# Patient Record
Sex: MALE | Race: WHITE | NOT HISPANIC OR LATINO | Employment: OTHER | URBAN - METROPOLITAN AREA
[De-identification: names, ages, dates, MRNs, and addresses within clinical notes are randomized per-mention and may not be internally consistent; named-entity substitution may affect disease eponyms.]

---

## 2019-01-27 ENCOUNTER — HOSPITAL ENCOUNTER (INPATIENT)
Facility: CLINIC | Age: 34
LOS: 8 days | Discharge: HOME | DRG: 885 | End: 2019-02-04
Attending: EMERGENCY MEDICINE | Admitting: PSYCHIATRY & NEUROLOGY
Payer: COMMERCIAL

## 2019-01-27 ENCOUNTER — TELEPHONE (OUTPATIENT)
Dept: BEHAVIORAL HEALTH | Facility: CLINIC | Age: 34
End: 2019-01-27

## 2019-01-27 DIAGNOSIS — F25.0 SCHIZOAFFECTIVE DISORDER, BIPOLAR TYPE (H): ICD-10-CM

## 2019-01-27 PROBLEM — F30.9 MANIA (H): Status: ACTIVE | Noted: 2019-01-27

## 2019-01-27 LAB
AMPHETAMINES UR QL SCN: NEGATIVE
ANION GAP SERPL CALCULATED.3IONS-SCNC: 7 MMOL/L (ref 3–14)
BARBITURATES UR QL: NEGATIVE
BASOPHILS # BLD AUTO: 0.1 10E9/L (ref 0–0.2)
BASOPHILS NFR BLD AUTO: 0.5 %
BENZODIAZ UR QL: NEGATIVE
BUN SERPL-MCNC: 10 MG/DL (ref 7–30)
CALCIUM SERPL-MCNC: 9 MG/DL (ref 8.5–10.1)
CANNABINOIDS UR QL SCN: POSITIVE
CHLORIDE SERPL-SCNC: 106 MMOL/L (ref 94–109)
CO2 SERPL-SCNC: 26 MMOL/L (ref 20–32)
COCAINE UR QL: NEGATIVE
CREAT SERPL-MCNC: 1.01 MG/DL (ref 0.66–1.25)
DIFFERENTIAL METHOD BLD: ABNORMAL
EOSINOPHIL # BLD AUTO: 0.1 10E9/L (ref 0–0.7)
EOSINOPHIL NFR BLD AUTO: 0.7 %
ERYTHROCYTE [DISTWIDTH] IN BLOOD BY AUTOMATED COUNT: 12.9 % (ref 10–15)
GFR SERPL CREATININE-BSD FRML MDRD: >90 ML/MIN/{1.73_M2}
GLUCOSE SERPL-MCNC: 94 MG/DL (ref 70–99)
HCT VFR BLD AUTO: 38.2 % (ref 40–53)
HGB BLD-MCNC: 13.5 G/DL (ref 13.3–17.7)
IMM GRANULOCYTES # BLD: 0 10E9/L (ref 0–0.4)
IMM GRANULOCYTES NFR BLD: 0.3 %
LYMPHOCYTES # BLD AUTO: 2.1 10E9/L (ref 0.8–5.3)
LYMPHOCYTES NFR BLD AUTO: 18.3 %
MCH RBC QN AUTO: 31 PG (ref 26.5–33)
MCHC RBC AUTO-ENTMCNC: 35.3 G/DL (ref 31.5–36.5)
MCV RBC AUTO: 88 FL (ref 78–100)
MONOCYTES # BLD AUTO: 0.8 10E9/L (ref 0–1.3)
MONOCYTES NFR BLD AUTO: 7.3 %
NEUTROPHILS # BLD AUTO: 8.2 10E9/L (ref 1.6–8.3)
NEUTROPHILS NFR BLD AUTO: 72.9 %
NRBC # BLD AUTO: 0 10*3/UL
NRBC BLD AUTO-RTO: 0 /100
OPIATES UR QL SCN: NEGATIVE
PCP UR QL SCN: NEGATIVE
PLATELET # BLD AUTO: 305 10E9/L (ref 150–450)
POTASSIUM SERPL-SCNC: 3.3 MMOL/L (ref 3.4–5.3)
RBC # BLD AUTO: 4.35 10E12/L (ref 4.4–5.9)
SODIUM SERPL-SCNC: 139 MMOL/L (ref 133–144)
TSH SERPL DL<=0.005 MIU/L-ACNC: 1.26 MU/L (ref 0.4–4)
WBC # BLD AUTO: 11.3 10E9/L (ref 4–11)

## 2019-01-27 PROCEDURE — 25000132 ZZH RX MED GY IP 250 OP 250 PS 637: Performed by: PSYCHIATRY & NEUROLOGY

## 2019-01-27 PROCEDURE — 12400000 ZZH R&B MH

## 2019-01-27 PROCEDURE — 25000132 ZZH RX MED GY IP 250 OP 250 PS 637: Performed by: EMERGENCY MEDICINE

## 2019-01-27 PROCEDURE — 99232 SBSQ HOSP IP/OBS MODERATE 35: CPT | Performed by: PHYSICIAN ASSISTANT

## 2019-01-27 PROCEDURE — 99207 ZZC CDG-HISTORY COMP: MEETS EXP. PROBLEM FOCUSED - DOWN CODED LACK OF HPI: CPT | Performed by: PHYSICIAN ASSISTANT

## 2019-01-27 PROCEDURE — 84443 ASSAY THYROID STIM HORMONE: CPT | Performed by: EMERGENCY MEDICINE

## 2019-01-27 PROCEDURE — 85025 COMPLETE CBC W/AUTO DIFF WBC: CPT | Performed by: EMERGENCY MEDICINE

## 2019-01-27 PROCEDURE — 99285 EMERGENCY DEPT VISIT HI MDM: CPT | Mod: 25

## 2019-01-27 PROCEDURE — 90791 PSYCH DIAGNOSTIC EVALUATION: CPT

## 2019-01-27 PROCEDURE — 90853 GROUP PSYCHOTHERAPY: CPT

## 2019-01-27 PROCEDURE — 80307 DRUG TEST PRSMV CHEM ANLYZR: CPT | Performed by: EMERGENCY MEDICINE

## 2019-01-27 PROCEDURE — 80048 BASIC METABOLIC PNL TOTAL CA: CPT | Performed by: EMERGENCY MEDICINE

## 2019-01-27 RX ORDER — QUETIAPINE FUMARATE 25 MG/1
25 TABLET, FILM COATED ORAL 3 TIMES DAILY PRN
Status: DISCONTINUED | OUTPATIENT
Start: 2019-01-27 | End: 2019-01-28

## 2019-01-27 RX ORDER — ACETAMINOPHEN 325 MG/1
650 TABLET ORAL EVERY 4 HOURS PRN
Status: DISCONTINUED | OUTPATIENT
Start: 2019-01-27 | End: 2019-02-04 | Stop reason: HOSPADM

## 2019-01-27 RX ORDER — THIOTHIXENE 10 MG/1
10 CAPSULE ORAL DAILY
Status: ON HOLD | COMMUNITY
Start: 2018-07-30 | End: 2019-02-04

## 2019-01-27 RX ORDER — POTASSIUM CHLORIDE 1500 MG/1
40 TABLET, EXTENDED RELEASE ORAL ONCE
Status: COMPLETED | OUTPATIENT
Start: 2019-01-27 | End: 2019-01-27

## 2019-01-27 RX ORDER — THIOTHIXENE 5 MG/1
5 CAPSULE ORAL EVERY MORNING
Status: DISCONTINUED | OUTPATIENT
Start: 2019-01-27 | End: 2019-01-29

## 2019-01-27 RX ORDER — HYDROXYZINE HYDROCHLORIDE 25 MG/1
25 TABLET, FILM COATED ORAL EVERY 4 HOURS PRN
Status: DISCONTINUED | OUTPATIENT
Start: 2019-01-27 | End: 2019-02-04 | Stop reason: HOSPADM

## 2019-01-27 RX ORDER — METOPROLOL TARTRATE 25 MG/1
TABLET, FILM COATED ORAL
COMMUNITY
Start: 2018-10-01 | End: 2021-06-29

## 2019-01-27 RX ORDER — METOPROLOL TARTRATE 25 MG/1
25 TABLET, FILM COATED ORAL 3 TIMES DAILY PRN
Status: DISCONTINUED | OUTPATIENT
Start: 2019-01-27 | End: 2019-02-04 | Stop reason: HOSPADM

## 2019-01-27 RX ORDER — QUETIAPINE FUMARATE 25 MG/1
25-37.5 TABLET, FILM COATED ORAL AT BEDTIME
Status: ON HOLD | COMMUNITY
Start: 2018-07-30 | End: 2019-02-04

## 2019-01-27 RX ORDER — THIOTHIXENE 5 MG/1
10 CAPSULE ORAL AT BEDTIME
Status: DISCONTINUED | OUTPATIENT
Start: 2019-01-27 | End: 2019-01-29

## 2019-01-27 RX ADMIN — QUETIAPINE 25 MG: 25 TABLET, FILM COATED ORAL at 13:12

## 2019-01-27 RX ADMIN — METOPROLOL TARTRATE 25 MG: 25 TABLET ORAL at 14:26

## 2019-01-27 RX ADMIN — QUETIAPINE 25 MG: 25 TABLET, FILM COATED ORAL at 21:26

## 2019-01-27 RX ADMIN — HYDROXYZINE HYDROCHLORIDE 25 MG: 25 TABLET ORAL at 11:41

## 2019-01-27 RX ADMIN — METOPROLOL TARTRATE 25 MG: 25 TABLET ORAL at 11:41

## 2019-01-27 RX ADMIN — THIOTHIXENE 5 MG: 5 CAPSULE ORAL at 12:26

## 2019-01-27 RX ADMIN — POTASSIUM CHLORIDE 40 MEQ: 1500 TABLET, EXTENDED RELEASE ORAL at 06:38

## 2019-01-27 RX ADMIN — THIOTHIXENE 10 MG: 5 CAPSULE ORAL at 21:26

## 2019-01-27 RX ADMIN — OMEPRAZOLE 20 MG: 20 CAPSULE, DELAYED RELEASE ORAL at 21:26

## 2019-01-27 SDOH — HEALTH STABILITY: MENTAL HEALTH: HOW OFTEN DO YOU HAVE A DRINK CONTAINING ALCOHOL?: NEVER

## 2019-01-27 ASSESSMENT — ENCOUNTER SYMPTOMS
SLEEP DISTURBANCE: 1
FEVER: 0
ABDOMINAL PAIN: 0
COUGH: 0
HALLUCINATIONS: 1
SORE THROAT: 0

## 2019-01-27 ASSESSMENT — ACTIVITIES OF DAILY LIVING (ADL)
HYGIENE/GROOMING: INDEPENDENT
DRESS: STREET CLOTHES
LAUNDRY: WITH SUPERVISION
ORAL_HYGIENE: INDEPENDENT

## 2019-01-27 ASSESSMENT — MIFFLIN-ST. JEOR: SCORE: 1745.48

## 2019-01-27 NOTE — TELEPHONE ENCOUNTER
S: Pt is a 34yo male in the  ED for medication adjustment    B:  Pt has a a hx of bipolar and schizoaffective do. Hx of inpatient and sees a psychiatrist. Pt started to reduce medication (Navane) with psychiatrist. Pt is starting to have manic symptoms. Poor eating and sleeping. Cycles between manic and depressive symptoms. Hx of using etoh about a year ago. States he uses tch daily to cope. States he has a hx of hallucinations, currently stating he's hearing the voice of god. No SI/HI. Pt states he shouldn't have stopped his medication and is seeking admission to stabalize    A: Utox positive for THC. No medical concerns    R: 77/Awosika

## 2019-01-27 NOTE — H&P
[facility]    History and Physical - Hospitalist Service       Date of Admission:  1/27/2019    Assessment & Plan   Sam Minor is a 33 year old male with a PMH of schizoaffective disorder admitted on 1/27/2019 for increased symptoms of pari.    Schizoaffective Disorder: Increased symptoms of pari after rapid taper off psychiatric medications  - Management per Psychiatry    Hypokalemia, mild: K 3.3 on admission. Replaced in the ER. Denies recent diminished appetite, N/V/D, no culprit medications  - Repeat for tomorrow ordered by primary service. Suspect this will be WNL    GERD  - Continue PTA PPI and Zantac    Polysubstance Abuse:  Utox positive for cannabis        Diet: Regular Diet Adult    DVT Prophylaxis: Ambulate every shift  Hernandez Catheter: not present  Code Status: Full Code      Disposition Plan   Expected discharge: Per primary service. Patient is medically stable. Hospitalist will sign off. Please call if additional concerns arise.  Entered: Joan Jimenez PA-C 01/27/2019, 3:42 PM     Joan Jimenez PA-C  [facility]    ______________________________________________________________________    Chief Complaint   Pari    History is obtained from the patient and chart review    History of Present Illness   Sam Minor is a 33 year old male with a PMH of schizoaffective disorder who presented to the ER with increased symptoms of pari. He is followed by a psychiatrist through [facility]. Patient wanted to taper of his previously prescribed Navane and did it more rapidly that recommended by his outpatient psychiatrist. He is currently admitted to inpatient Psychiatry.    Presently, denies concerns or complaints. No recent fever/chills. No N/V/D    Review of Systems    The 10 point Review of Systems is negative other than noted in the HPI or here.     Past Medical History    I have reviewed this patient's medical history and updated it with pertinent  information if needed.   Past Medical History:   Diagnosis Date     Adjustment disorder      Anxiety      Bipolar 1 disorder (H)      Depressive disorder      Esophageal reflux      OCD (obsessive compulsive disorder)      Schizoaffective disorder (H)      Schizophrenia (H)      Vitamin D deficiency        Past Surgical History   I have reviewed this patient's surgical history and updated it with pertinent information if needed.  History reviewed. No pertinent surgical history.  No surgical history per patient    Social History   I have reviewed this patient's social history and updated it with pertinent information if needed.  Social History     Tobacco Use     Smoking status: Never Smoker     Smokeless tobacco: Never Used   Substance Use Topics     Alcohol use: No     Frequency: Never     Drug use: Yes     Types: Marijuana       Family History   I have reviewed this patient's family history and updated it with pertinent information if needed.   History reviewed. No pertinent family history.  Patient states family history of alcoholism    Prior to Admission Medications   Prior to Admission Medications   Prescriptions Last Dose Informant Patient Reported? Taking?   QUEtiapine (SEROQUEL) 25 MG tablet 1/26/2019 at Unknown time Self Yes Yes   Sig: Take 25-37.5 mg by mouth At Bedtime    metoprolol tartrate (LOPRESSOR) 25 MG tablet 1/26/2019 at PM Self Yes Yes   Sig: TAKE 1 TABLET BY MOUTH UP TO THREE TIMES DAILY   omeprazole (PRILOSEC) 20 MG DR capsule 1/26/2019 at PM Self Yes Yes   Sig: Take 20 mg by mouth daily    ranitidine (ZANTAC) 150 MG tablet 1/26/2019 at Unknown time Self Yes Yes   Sig: Take 150 mg by mouth daily    thiothixene (NAVANE) 10 MG capsule 1/26/2019 at Unknown time Self Yes Yes   Sig: Take 10 mg by mouth daily       Facility-Administered Medications: None     Allergies   Allergies   Allergen Reactions     Haloperidol Anaphylaxis       Physical Exam   Vital Signs: Temp: 99.1  F (37.3  C)   BP: (!)  132/92   Heart Rate: 100 Resp: 16 SpO2: 95 %      Weight: 173 lbs 12.8 oz    Constitutional: Alert, resting comfortably in NAD  Respiratory: Normal effort, symmetric expansion, no crackles or wheezing  Cardiovascular: RRR no murmurs   GI: Non distended,  no tenderness or guarding  MSK: LE without edema.  Skin/Integumen: Clear  Neuro: CN II-XII grossly intact  Psych:  Alert and oriented x 3. Normal affect      Data   Data reviewed today: I reviewed all medications, new labs and imaging results over the last 24 hours. I personally reviewed no images or EKG's today.    Recent Labs   Lab 01/27/19  0604   WBC 11.3*   HGB 13.5   MCV 88         POTASSIUM 3.3*   CHLORIDE 106   CO2 26   BUN 10   CR 1.01   ANIONGAP 7   ELLEN 9.0   GLC 94

## 2019-01-27 NOTE — PROGRESS NOTES
01/27/19 1053   Patient Belongings   Did you bring any home meds/supplements to the hospital?  No   Patient Belongings other (see comments)   Belongings Search Yes   Clothing Search Yes   Second Staff Marito    Comment All belongings searched and placed in locker on unit      Black coat   1 pair of shoes  1 hat   1 pair of khaki pants  Lip balm   Nasal strips   Gray long sleeve shirt   Wallet  [place] 's license  [employer] car insurance card  Rewards cards  Membership cards  [employer] insurance cards  Business cards  Miscellaneous papers  [employer]    In security envelope #[phone]  [place] EBT Card  Visa [phone]     Admission:  I am responsible for any personal items that are not sent to the safe or pharmacy. [facility] is not responsible for loss, theft or damage of any property in my possession.        Patient Signature: ___________________________________________      Date/Time:__________________________     Staff Signature: __________________________________      Date/Time:__________________________     [facility] Staff person, if patient is unable/unwilling to sign:      __________________________________________________________      Date/Time: __________________________        Discharge:  [facility] has returned all of my personal belongings:     Patient Signature: ________________________________________     Date/Time: ____________________________________     Staff Signature: ______________________________________     Date/Time:_____________________________________

## 2019-01-27 NOTE — ED PROVIDER NOTES
"  History     Chief Complaint:  Psychiatric Evaluation    HPI   Sam Minor is a 33 year old male, with a history of schizoaffective disorder and other mental health illnesses, who presents alone via EMS to the emergency department for psychiatric evaluation. The patient was recently seen by his provider on 1/23/19 and the medical record states that the patient had tapered off Navane faster than his provider wanted and has been having accelerated thoughts, increased mental energy, increased aggression especially when his parents confront him about his medications, trouble sleeping and Yarsanism preoccupation. His provider states that he is in deny about his mental illness and actually attributes his mental illness to the Navane use. He uses Seroquel for sleep and his provider has increased his dose at bedtime.     Here in the ED, patient reports that he has been having an \"internal struggle because I'm trying to maintain balance and find peace\". Patient reported that he had called EMS today to present as he felt as though he was \"dying internally\". He notes he has been weaning off has Navane, but realizes \"I may actually need this\". He reports that he felt like he initially no longer needed this medication as he has been sober for one year from alcohol. He does endorse every day marijuana use as he notes it \"enlightens me and brings me closer to God\". He states that \"Worship scares me, but I have so much fortino and love and I just feel lost\". He also noted increased sleep disturbance as he has been unable to sleep the last few days as he has been weaning off his Navane. He does note intermittent auditory hallucinations, but endorses that its \"the voice of God\". He adamantly denies any suicidal or homicidal ideation as \"I made a promise to God I wouldn't\". He notes that he feels as though he cannot go back home and would like to be admitted. He denies any recent fever, cough, sore throat, abdominal pain or " other physical complaint.     Allergies:  Haloperiodol     Medications:    Lopressor  Prilosec  Seroquel  Zantac  Navane    Past Medical History:    Schizoaffective disorder  Bipolar disorder  Generalized anxiety disorder  Schizophrenia  Depression  Adjustment disorder  OCD  Substance use disorder  Vitamin D deficiency  Esophageal reflux    Past Surgical History:    History reviewed. No pertinent past surgical history.     Family History:    The patient denies any relevant family medical history.     Social History:  The patient was accompanied to the ED by EMS.  Smoking Status: N/A  Smokeless Tobacco: No  Alcohol Use: Former - Quit Jan 2018  Drug Use: N/A   Marital Status:  Single [1]     Review of Systems   Constitutional: Negative for fever.   HENT: Negative for sore throat.    Respiratory: Negative for cough.    Gastrointestinal: Negative for abdominal pain.   Psychiatric/Behavioral: Positive for hallucinations and sleep disturbance. Negative for suicidal ideas.        Denies homicidal ideation   All other systems reviewed and are negative.      Physical Exam   Vitals:  Patient Vitals for the past 24 hrs:   BP SpO2   01/27/19 0640 (!) 146/92 97 %   01/27/19 0510 (!) 136/94 96 %      Physical Exam  Nursing note and vitals reviewed.  Constitutional:  Appears well-developed and well-nourished. Pleasant and polite.  HENT:   Head:    Atraumatic.   Mouth/Throat:   Oropharynx is clear and moist. No oropharyngeal exudate.   Eyes:    Pupils are equal, round, and reactive to light.   Neck:    Normal range of motion. Neck supple.      No tracheal deviation present. No thyromegaly present.   Cardiovascular:  Normal rate, regular rhythm, no murmur   Pulmonary/Chest: Breath sounds are clear and equal without wheezes or crackles.  Abdominal:   Soft. Bowel sounds are normal. Exhibits no distension and      no mass. There is no tenderness.      There is no rebound and no guarding.   Musculoskeletal:  Exhibits no edema.    Lymphadenopathy:  No cervical adenopathy.   Neurological:   Alert and oriented to person, place, and time.   Skin:    Skin is warm and dry. No rash noted. No pallor.    Psych:   Calm, cooperative and makes good eye contact.  Emergency Department Course     Laboratory:  Laboratory findings were communicated with the patient who voiced understanding of the findings.  CBC: WBC 11.3 (H) o/w WNL (HGB 13.5, )  BMP: Potassium 3.3 (L) o/w WNL (Creatinine 1.01)    Drug abuse screen urine: Cannabinoids Qual Urine Positive (A) o/w WNL    Interventions:  0638 Potassium chloride 40 mEq PO     Emergency Department Course:  Nursing notes and vitals reviewed.  IV was inserted and blood was drawn for laboratory testing, results above.  The patient provided a urine sample here in the emergency department. This was sent for laboratory testing, findings above.     6:42 AM: I performed an exam of the patient as documented above. History obtained from patient.   6:47 AM: Notified by RN that patient is in line to be seen by [facility] Dec.   9:36 AM: Notified that patient will be admitted to [address] [place] [address].  10:33 AM: Notified that Dr. Valentin will be admitting patient to [facility].    I discussed the treatment plan with the patient. They expressed understanding of this plan and consented to admission. DEC discussed the patient with Dr. Valentin, who will admit the patient to a monitored bed for further evaluation and treatment.     I personally reviewed the laboratory results with the Patient and answered all related questions prior to admission.    Impression & Plan      Medical Decision Making:  This patient is having an exacerbation of his schizoaffective disorder after stopping his Navane medication. He is having increased Christian preoccupations and auditory hallucinations of hearing God talking to him as well as difficulty sleeping, racing thoughts and some increased agitation. He wishes to be admitted to the hospital and [facility] therefore he was admitted to [address] [place] [address] under the care of psychiatry and he was a voluntary admission. He was not having any suicidal or homicidal ideations and he seems to have very good insight into his mental health problems at this time.     Diagnosis:    ICD-10-CM    1. Schizoaffective disorder, bipolar type (H) F25.0         Disposition:   Admission.    Scribe Disclosure:  Yisel REYES, am serving as a scribe at 6:07 AM on 1/27/2019 to document services personally performed by Angelica Loera MD, based on my observations and the provider's statements to me.  1/27/2019    EMERGENCY DEPARTMENT       Angelica Loera MD  02/02/19 0144

## 2019-01-27 NOTE — ED NOTES
Bed: ED18  Expected date: 1/27/19  Expected time: 4:57 AM  Means of arrival:   Comments:  HEMS  [phone]  [phone]  Psych med eval/Bipolar

## 2019-01-27 NOTE — PLAN OF CARE
"Patient admitted from our ED for medications adjustment. Pt is pleasant slightly hyperverbal with racing thoughts and poor sleep.  Pt states he has been sober from ETOH for 1 year and he states that he and his out patient Psychiatrist thought he could decrease his psychiatric medications.This did not go well and he is here to be reinstated on his medications. He is an unreliable historian and he told this writer that he has not had any CD admissions or MH admissions but later said he has had 4 admissions for [place] [facility]  in the past 16 years. Last one in July 2018. Pt states he had a near death at age 17 after smoking a bag of \"tainted weed\" and after this he hears the voice of God continuously, not a physical voice but more of a feeling because he is an empath.  Pt states he smokes Mariajuana daily since age 13 and it is medicinal for him.     Welcome packet reviewed with patient. Information reviewed includes getting emergency help, preventing infections, understanding your care, using medication safely, reducing falls, preventing pressure ulcers, smoking cessation, powerful choices and Patients Bill of Rights. Pt. given tour of the unit and instruction on use of facility including emergency call light. Program schedule reviewed with patient. Questions regarding the unit addressed. Pt. Search completed and belongings inventoried.    Nursing assessment complete including patient and medication profiles. Risk assessments completed addressing suicide,fall,skin,nutrition and safety issues. Care plan initiated. Assessments reviewed with physician and admit orders received. Video monitoring in progress, Patient Informed.   "

## 2019-01-27 NOTE — PHARMACY-ADMISSION MEDICATION HISTORY
Admission medication history interview status for the 1/27/2019  admission is complete. See EPIC admission navigator for prior to admission medications     Medication history source reliability:Good    Actions taken by pharmacist (provider contacted, etc):Spoke to patient and reviewed surescripts.    Additional medication history information not noted on PTA med list:    - Per patient, he takes thiothixene 10mg once daily -- has been trying to wean off of medication. Most recently fill at retail is for thiothixine 5mg in am and 10mg in pm. Unclear if patient is taking as directed, but reports took 10mg yesterday.   - metoprolol script is written for 25mg TID, but patient says he typically takes 1-2 tablets and was told to take it up to 3 times a day for anxiety.     Medication reconciliation/reorder completed by provider prior to medication history? No    Time spent in this activity: 20 min    Prior to Admission medications    Medication Sig Last Dose Taking? Auth Provider   metoprolol tartrate (LOPRESSOR) 25 MG tablet TAKE 1 TABLET BY MOUTH UP TO THREE TIMES DAILY 1/26/2019 at PM Yes Reported, Patient   omeprazole (PRILOSEC) 20 MG DR capsule Take 20 mg by mouth daily  1/26/2019 at PM Yes Reported, Patient   QUEtiapine (SEROQUEL) 25 MG tablet Take 25-37.5 mg by mouth At Bedtime  1/26/2019 at Unknown time Yes Reported, Patient   ranitidine (ZANTAC) 150 MG tablet Take 150 mg by mouth daily  1/26/2019 at Unknown time Yes Reported, Patient   thiothixene (NAVANE) 10 MG capsule Take 10 mg by mouth daily  1/26/2019 at Unknown time Yes Reported, Patient     Yi Hernández, PharmD IV Student

## 2019-01-28 LAB — POTASSIUM SERPL-SCNC: 3.6 MMOL/L (ref 3.4–5.3)

## 2019-01-28 PROCEDURE — 25000132 ZZH RX MED GY IP 250 OP 250 PS 637: Performed by: PSYCHIATRY & NEUROLOGY

## 2019-01-28 PROCEDURE — 12400000 ZZH R&B MH

## 2019-01-28 PROCEDURE — 25000128 H RX IP 250 OP 636: Performed by: PSYCHIATRY & NEUROLOGY

## 2019-01-28 PROCEDURE — 25000125 ZZHC RX 250

## 2019-01-28 PROCEDURE — 99232 SBSQ HOSP IP/OBS MODERATE 35: CPT | Performed by: NURSE PRACTITIONER

## 2019-01-28 PROCEDURE — 36415 COLL VENOUS BLD VENIPUNCTURE: CPT | Performed by: PSYCHIATRY & NEUROLOGY

## 2019-01-28 PROCEDURE — 84132 ASSAY OF SERUM POTASSIUM: CPT | Performed by: PSYCHIATRY & NEUROLOGY

## 2019-01-28 RX ORDER — QUETIAPINE FUMARATE 100 MG/1
100 TABLET, FILM COATED ORAL AT BEDTIME
Status: DISCONTINUED | OUTPATIENT
Start: 2019-01-28 | End: 2019-01-28

## 2019-01-28 RX ORDER — OLANZAPINE 10 MG/2ML
5-10 INJECTION, POWDER, FOR SOLUTION INTRAMUSCULAR EVERY 6 HOURS PRN
Status: DISCONTINUED | OUTPATIENT
Start: 2019-01-28 | End: 2019-02-04 | Stop reason: HOSPADM

## 2019-01-28 RX ORDER — QUETIAPINE FUMARATE 100 MG/1
100 TABLET, FILM COATED ORAL ONCE
Status: COMPLETED | OUTPATIENT
Start: 2019-01-28 | End: 2019-01-28

## 2019-01-28 RX ORDER — WATER 10 ML/10ML
INJECTION INTRAMUSCULAR; INTRAVENOUS; SUBCUTANEOUS
Status: COMPLETED
Start: 2019-01-28 | End: 2019-01-28

## 2019-01-28 RX ORDER — OLANZAPINE 10 MG/2ML
10 INJECTION, POWDER, FOR SOLUTION INTRAMUSCULAR ONCE
Status: DISCONTINUED | OUTPATIENT
Start: 2019-01-28 | End: 2019-01-28

## 2019-01-28 RX ORDER — QUETIAPINE FUMARATE 200 MG/1
200 TABLET, FILM COATED ORAL AT BEDTIME
Status: DISCONTINUED | OUTPATIENT
Start: 2019-01-28 | End: 2019-01-29

## 2019-01-28 RX ORDER — CARBAMAZEPINE 100 MG/1
100 TABLET, EXTENDED RELEASE ORAL 2 TIMES DAILY
Status: DISCONTINUED | OUTPATIENT
Start: 2019-01-28 | End: 2019-01-29

## 2019-01-28 RX ORDER — OLANZAPINE 5 MG/1
5-10 TABLET, ORALLY DISINTEGRATING ORAL EVERY 6 HOURS PRN
Status: DISCONTINUED | OUTPATIENT
Start: 2019-01-28 | End: 2019-02-04 | Stop reason: HOSPADM

## 2019-01-28 RX ADMIN — THIOTHIXENE 10 MG: 5 CAPSULE ORAL at 20:25

## 2019-01-28 RX ADMIN — HYDROXYZINE HYDROCHLORIDE 25 MG: 25 TABLET ORAL at 00:22

## 2019-01-28 RX ADMIN — CARBAMAZEPINE 100 MG: 100 TABLET, EXTENDED RELEASE ORAL at 20:44

## 2019-01-28 RX ADMIN — OMEPRAZOLE 20 MG: 20 CAPSULE, DELAYED RELEASE ORAL at 20:25

## 2019-01-28 RX ADMIN — THIOTHIXENE 5 MG: 5 CAPSULE ORAL at 13:18

## 2019-01-28 RX ADMIN — WATER 5 ML: 1 INJECTION INTRAMUSCULAR; INTRAVENOUS; SUBCUTANEOUS at 21:06

## 2019-01-28 RX ADMIN — HYDROXYZINE HYDROCHLORIDE 25 MG: 25 TABLET ORAL at 12:00

## 2019-01-28 RX ADMIN — QUETIAPINE FUMARATE 200 MG: 200 TABLET ORAL at 20:26

## 2019-01-28 RX ADMIN — OLANZAPINE 10 MG: 10 INJECTION, POWDER, FOR SOLUTION INTRAMUSCULAR at 21:06

## 2019-01-28 RX ADMIN — QUETIAPINE 25 MG: 25 TABLET, FILM COATED ORAL at 12:00

## 2019-01-28 RX ADMIN — QUETIAPINE FUMARATE 100 MG: 100 TABLET ORAL at 13:27

## 2019-01-28 ASSESSMENT — ACTIVITIES OF DAILY LIVING (ADL)
DRESS: STREET CLOTHES;INDEPENDENT
HYGIENE/GROOMING: INDEPENDENT
HYGIENE/GROOMING: INDEPENDENT
ORAL_HYGIENE: INDEPENDENT
LAUNDRY: WITH SUPERVISION
LAUNDRY: WITH SUPERVISION
ORAL_HYGIENE: INDEPENDENT
DRESS: STREET CLOTHES;INDEPENDENT

## 2019-01-28 NOTE — PLAN OF CARE
"Pleasant and cooperative.  Enjoyed having visitors. Social with roommate. C/o an inability to focus and feels \"scattered.\"  Pt says this is due to being exhausted.  Reports not sleeping since Saturday.  Hopes his meds help.  Denied SI, auditory and visual hallucinations.  Denied any physical complaints. States his mood is \"more centered\" appears pensive.   "

## 2019-01-28 NOTE — H&P
"Admitted:     01/27/2019      PSYCHIATRIC EVALUATION       IDENTIFYING DATA AND REASON FOR REFERRAL:  Sam Minor is a 33-year-old man who is single and reports no children.  He currently resides with his parents in [place].  He is under the care of Keegan Crooks DO at [facility].  He presented to [facility] ED by EMS on account of psychiatric decompensation following discontinuation of his long-term psychotropic medication Navane.  Information was gathered through direct patient contact.  He is admitted as a voluntary patient.  Information was gathered through direct patient contact.      CHIEF COMPLAINT:  \"Pari.\"      HISTORY OF PRESENT ILLNESS:  Sam Minor reports a longstanding history of mental illness.  He states he has been on psychotropic medications for the last 16 years.  He states he sees Keegan Crooks DO at [facility] and has been his patient for the last 12 years.  He tells me that he hears the voice of God with and without medications, but he states that Navane had helped him for many years to stay stable.  He reports that he was diagnosed with schizoaffective disorder, bipolar type, several years ago while he was still drinking alcohol heavily.  He reports that, \"I hear the divine voice of the Father all the time.\"  The patient tells me that since he quit drinking alcohol a year ago and stopping cigarette smoking 3-1/2 years ago, he and his psychiatrist decided to taper and discontinue his Navane as an experiment since he was doing so well.  He reports that he started taking Navane at 10 mg daily at bedtime for a month and then he took 5 mg daily at bedtime for another month before he started taking 5 mg after 5 days and then he went off the medication for 3 days and then a couple days.  He claims he insisted on tapering off the medication because he was ashamed he was taking pills because \"I was hearing the voice of my " "father.\"  The patient reports that since he went off Navane, he has been experiencing racing thoughts, increasing auditory hallucinations, \"more than I want to admit.\"  He states he lacks sleep and he feels rather antsy and pressured.  He states that lack of sleep, lack of water, caffeinated beverages and alcohol worsen his psychosis.  He reports that he had tried and failed Risperdal, Zyprexa, Abilify, Geodon and Haldol, but he does not recall ever trying Invega, Saphris, Rexulti, Vraylar.  He recalls trying Seroquel and reports that that did help him, as it sedated him at as low as 6.25 mg at bedtime.  He reports that anytime he experiences erum, he is very uncomfortable, as he experiences internal struggles and felt like, \"I was internally combusting.\"  He reports that he had tried Depakote, which essentially made him feel good because it increased his appetite and he gained a lot of weight.  He states he will never go on it again.  He states lithium caused him to experience increased urination.  He does not recall ever trying lamotrigine or carbamazepine.  [place] [facility] [place]      CHEMICAL USE HISTORY:  The patient admits that he discontinued alcohol use a year ago, but still smokes marijuana on a daily basis.      PAST MEDICAL HISTORY:  The patient reports being in good physical health and denies any chronic illnesses; however, review of records suggest that he does have gastroesophageal reflux disease and vitamin D deficiency.      PAST SURGICAL HISTORY:  None reported.      ALLERGIES:  HALOPERIDOL, WHICH CAUSED AKATHISIA AND AGITATION.      MEDICATIONS  PRIOR TO ADMISSION:  Lopressor 25 mg t.i.d. p.r.n., Prilosec 20 mg p.o. daily, Seroquel 25 mg one to 37.5 mg p.o. at bedtime, Zantac 150 mg p.o. daily, Navane 10 mg p.o. daily.      FAMILY PSYCHIATRIC HISTORY:  The patient denies any family history of mental illness, but he reports that his father's brother " has Down syndrome.      SOCIAL HISTORY:  The patient grew up in [place].  The family ultimately moved to [place] in 1992.  He reports obtaining his GED after the 11th grade.  He resides with his parents in [place].  He has a younger brother.  He denies  or criminal history.  He is currently unemployed, but states he is on the verge of becoming a  for [employer].      REVIEW OF SYSTEMS:  A 10-point review of systems was negative apart from the pertinent positives in the history of present illness.      VITAL SIGNS:  Blood pressure 121/87, pulse 60, respirations 16, temperature 99.5, weight 78.8 kg.      MENTAL STATUS EXAMINATION:  This is a bespectacled middle-aged man who appears younger than his stated age of 33.  He is dressed in street clothes and ambulates on his own without difficulty.  He makes good eye contact.  He is hyperverbal and intense.  He is difficult to interrupt.  His mood is expansive and his affect is labile.  His thought process is mostly logical and relevant.  He admits to the presence of auditory hallucinations, but he declines to elaborate on the content.  He is religiously focused.  His impulse control is poor.  His recall of recent and remote events is intact.  He displays fair insight and judgment.  Risk assessment at this time is considered moderate to high.      DIAGNOSTIC IMPRESSION:  Sam Minor is a 33-year-old single father of none with known history of mental illness previously assessed as bipolar 1 disorder and schizoaffective disorder and under the care of Keegan Crooks DO at [facility].  He had tapered and discontinued his Navane, which had kept him stable for about 12 years while under the supervision of his outpatient psychiatrist.  He tells me he is open to transitioning to a different medication since Navane is becoming unavailable, but his parents are insisting he remain on Navane on account of the stability he had enjoyed for  several years.  His psychiatrist has been contacted, but he was unavailable and we are hoping that he will collaborate in his care during this hospitalization.  In the interim, we will offer the patient Tegretol and Seroquel, keeping the option of Navane open.        ADMITTING DIAGNOSES:      1.  Schizoaffective disorder, bipolar type, in acute episode.   2.  Cannabis use disorder.   3.  Alcohol use disorder, in remission.   4.  Gastroesophageal reflux disease.      PLAN:  The patient will be maintained on the step-down unit.  Staff will provide a safe environment for him.  He will be encouraged to participate in individual milieu and group therapy.  He is consenting to a trial of carbamazepine and Seroquel.  His father has brought in his home supply of Navane.  We will obtain baseline labs and commence a trial of Tegretol and Seroquel while awaiting collaboration from his outpatient psychiatrist.        ESTIMATED LENGTH OF STAY:  Five to 7 days.         WILLIE MANDUJANO MD             D: 2019   T: 2019   MT: DREA      Name:     SHAI WOODY   MRN:      [id number]        Account:      [id number]   :      1985        Admitted:     2019                   Document: [id number]

## 2019-01-28 NOTE — PLAN OF CARE
Goal: Team Discussion  Outcome: No change   BEHAVIORAL TEAM DISCUSSION     Participants: Dr. Valentin, nurses, social workers, psych asst   Progress: Pt is manic, pt attends groups and is social with other pts on the SDU.   Continued Stay Criteria/Rationale: Pt meds are going to be adjusted, pt will remain on the unit. Pt had an outburst during the shift, pt was yelling and believed that he was in heaven, see notes for further detail.   Medical/Physical:Seroquel 100 MG; Navane 10 MG  Precautions:        Behavioral Orders   Procedures    Code 1 - Restrict to Unit    Routine Programming       As clinically indicated    Seizure precautions    Status 15       Every 15 minutes.      Plan: Pt will remain on the unit, pt continues to be manic. Pt had an outburst this shift, pt will be moved to the ITC side.   Rationale for change in precautions or plan: Pt needs further treatment.

## 2019-01-28 NOTE — PROGRESS NOTES
"I received a call back at 1347 hrs from Dr. Keegan Crooks who is the patient's outpatient psychiatrist.  He reports that the patient has a delusional belief that Navane was formulated specifically for him and since the 's have gotten him \"addicted\" they make him feel like he is \"in the center of the hurricane\" which he feels he is able to \"punch through.\"  Dr. Crooks believes the patient will be a good candidate for lithium but he is aware that the patient has been refusing lithium, Depakote and Zyprexa but remains positively disposed toward Seroquel.  He states he would have no objection to him starting Seroquel and a primary mood stabilizer such as Tegretol or Trileptal.  He reports that he and the patient had discussed similar options in the past.  He would like to see the patient try higher doses of Seroquel to mitigate his psychotic symptoms.    Gladis Valentin MD      "
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
## 2019-01-28 NOTE — PROGRESS NOTES
01/28/19 1400   General Information   Has Not Attended OT as of: 01/28/19     Pt has not attended OT since admit.  Will continue to encourage participation and completion of  self-assessment as able. OT staff will explain the purpose of being involved with treatment plan and provide options to meet current needs and goals.

## 2019-01-29 PROCEDURE — 25000132 ZZH RX MED GY IP 250 OP 250 PS 637: Performed by: PSYCHIATRY & NEUROLOGY

## 2019-01-29 PROCEDURE — 12400000 ZZH R&B MH

## 2019-01-29 RX ORDER — QUETIAPINE FUMARATE 300 MG/1
300 TABLET, FILM COATED ORAL AT BEDTIME
Status: DISCONTINUED | OUTPATIENT
Start: 2019-01-29 | End: 2019-02-01

## 2019-01-29 RX ORDER — CARBAMAZEPINE 200 MG/1
200 CAPSULE, EXTENDED RELEASE ORAL 2 TIMES DAILY
Status: DISCONTINUED | OUTPATIENT
Start: 2019-01-29 | End: 2019-02-04 | Stop reason: HOSPADM

## 2019-01-29 RX ORDER — QUETIAPINE FUMARATE 100 MG/1
100 TABLET, FILM COATED ORAL DAILY
Status: DISCONTINUED | OUTPATIENT
Start: 2019-01-29 | End: 2019-01-31

## 2019-01-29 RX ORDER — CARBAMAZEPINE 100 MG/1
100 CAPSULE, EXTENDED RELEASE ORAL 2 TIMES DAILY
Status: DISCONTINUED | OUTPATIENT
Start: 2019-01-29 | End: 2019-01-29

## 2019-01-29 RX ADMIN — QUETIAPINE FUMARATE 300 MG: 300 TABLET ORAL at 21:17

## 2019-01-29 RX ADMIN — OMEPRAZOLE 20 MG: 20 CAPSULE, DELAYED RELEASE ORAL at 21:16

## 2019-01-29 RX ADMIN — METOPROLOL TARTRATE 25 MG: 25 TABLET ORAL at 21:16

## 2019-01-29 RX ADMIN — CARBAMAZEPINE 200 MG: 200 CAPSULE, EXTENDED RELEASE ORAL at 21:16

## 2019-01-29 RX ADMIN — CARBAMAZEPINE 100 MG: 100 CAPSULE, EXTENDED RELEASE ORAL at 09:29

## 2019-01-29 RX ADMIN — METOPROLOL TARTRATE 25 MG: 25 TABLET ORAL at 16:11

## 2019-01-29 RX ADMIN — THIOTHIXENE 5 MG: 5 CAPSULE ORAL at 08:53

## 2019-01-29 RX ADMIN — QUETIAPINE FUMARATE 100 MG: 100 TABLET ORAL at 11:52

## 2019-01-29 ASSESSMENT — ACTIVITIES OF DAILY LIVING (ADL)
DRESS: STREET CLOTHES
LAUNDRY: WITH SUPERVISION
DRESS: STREET CLOTHES
ORAL_HYGIENE: INDEPENDENT
HYGIENE/GROOMING: INDEPENDENT
LAUNDRY: WITH SUPERVISION
HYGIENE/GROOMING: INDEPENDENT
ORAL_HYGIENE: INDEPENDENT

## 2019-01-29 NOTE — PROGRESS NOTES
While going to attend to another pt it was noticed that the pt was sitting on the floor in the corner of the ITC with his head down. Attempted to ask the pt numerous times if he was okay or if he needed anything but pt did not respond. Pt had even and non-labored respirations. Pt then started to scream out loud aggressively that he was satan numerous times. Other staff members responded to ITC to attempt and calm the pt. Pt then became physically aggressive but not attempting to target any staff members. Code 21 was called and pt was assisted to his room. Pt was agreeable to receiving IM Zyprexa and injection was given in the L gluteal while laying in his bed. Staff member stayed at pt bedside to monitor.

## 2019-01-29 NOTE — PROGRESS NOTES
"[facility] Psychiatric Progress Note      Interval History:   Pt seen, team meeting held with , nursing staff, OT, and PA's to review diagnosis and treatment plan.  Patient reports that he wants to discontinue Navane altogether.  He was advised of the undersigned sick conversation with his outpatient psychiatrist Dr. Crooks.  He states he is open to trying higher doses of his  prescribed medication.  He was advised that he would be placed on a higher dose of Seroquel at bedtime while utilizing this smaller dose during the day and raising his carbamazepine dose to 200 mg twice daily.  Staff report that Code 21 had been called on him last evening on account of his level of agitation.  He has been more appropriate today.  He is aware that there is an interaction between Seroquel and carbamazepine.  He does not endorse any self-harm thoughts plans or intent.  He admits that he experiences command hallucinations from \"God\".   Review of systems:   The Review of Systems is negative other than noted in the HPI     Medications:       carBAMazepine  100 mg Oral BID     omeprazole  20 mg Oral QPM     QUEtiapine  200 mg Oral At Bedtime     thiothixene  10 mg Oral At Bedtime     thiothixene  5 mg Oral QAM     acetaminophen, hydrOXYzine, metoprolol tartrate, OLANZapine zydis **OR** OLANZapine, ranitidine    Mental Status Examination:     Appearance:  awake, alert, adequately groomed and casually dressed  Eye Contact:  better  Speech:  clear, coherent  Psychomotor Behavior:  no evidence of tardive dyskinesia, dystonia, or tics and fidgeting  Mood:  better   Affect: Mood congruent with labile affect   Thought Process:  logical, linear and goal oriented no loose associations  Thought Content:  no evidence of suicidal ideation or homicidal ideation.  Admits to the presence of auditory hallucinations that are religiously focused  Oriented to:  time, person, and place  Attention Span and Concentration:  " limited  Recent and Remote Memory:  limited  Fund of Knowledge: appropriate  Muscle Strength and Tone: normal  Gait and Station: Normal  Insight:  fair  Judgment:  limited      Labs/Vitals:   No results found for this or any previous visit (from the past 24 hour(s)).  B/P: 128/92, T: 97.6, P: 59, R: 18    Impression:   Sam Minor is a 33-year-old single father of none with known history of mental illness previously assessed as bipolar 1 disorder and schizoaffective disorder and under the care of Keegan Crooks DO at [facility].  He had tapered and discontinued his Navane, which had kept him stable for about 12 years while under the supervision of his outpatient psychiatrist.  He tells me he is open to transitioning to a different medication since Navane is becoming unavailable, but his parents are insisting he remain on Navane on account of the stability he had enjoyed for several years.       DIagnoses:     1.  Schizoaffective disorder, bipolar type, in acute episode.   2.  Cannabis use disorder.   3.  Alcohol use disorder, in remission.   4.  Gastroesophageal reflux disease.          Plan:   1. Written information given on medications. Side effects, risks, benefits reviewed.  2.  Continue hospitalization.  3.  Discontinue Navane.  Increase Seroquel to 300 mg nightly and 100 mg every morning.  4.  Increase Tegretol to 20 mg twice daily.    Attestation:  Patient has been seen and evaluated by me,  Gladis Valentin MD    PATIENT ID  Name: Sam Minor  MRN:[id number]  YOB: 1985

## 2019-01-29 NOTE — PLAN OF CARE
Patient pleasant and cooperative taking his medications. Pt denies SI and states he feels balanced today and currently is resting in bed. Pt social with peers and does exhibit better boundaries today. Mood improved.    No

## 2019-01-29 NOTE — PLAN OF CARE
"Pt presented with a blunt - flat affect and anxious mood. Around 21:10, pt had a random aggressive (verbally & aggressive)outburst as well as hyperventilating. Pt kept yelling \" I need to see Anshul, I have Satan in me. I need Anshul. Help!\". A PA was able to redirect the pt through some deep breathing exercise. Code 21 was called and pt was injected with Zyprexa, which calmed the pt. Pt also has some boundary issues and was seen kissing other pt. Pt was told that it was not appropriate and that it shouldn't happen again. Pt ate his food and was med compliant.   "

## 2019-01-29 NOTE — CODE/RAPID RESPONSE
[facility]  RRT Note: CODE 21  1/28/2019   Time Called: 2102  Code Status: Full Code      Assessment & Plan   IMPRESSION & PLAN:  I was paged to the bedside to evaluate Mr. Sam Minor for an acute episode of agitation and aggression. Patient was being inappropriate with other patients in the ITC, when redirected patient had a behavioral outburst. When I arrived, security and psychiatric techs had patient in his room. He seemed cooperative. Patient was asked to lay in bed to receive IM zyprexa, which he was amendable to.  Patient was given the option to remain relatively calm to avoid 5 point restraints. If the patient becomes inappropriate or aggressive, he will be restrained.    INTERVENTIONS:  -- 10 mg IM zyprexa  -- verbal redirection  -- 1:1 sitter  -- call for restraints if zyprexa is not adequate enough    Interval History     Sam Minor is a 33 year old male who was admitted on 1/27/2019 for psychiatric decompensation.    Medical history significant for:   Past Medical History:   Diagnosis Date     Adjustment disorder      Anxiety      Bipolar 1 disorder (H)      Depressive disorder      Esophageal reflux      OCD (obsessive compulsive disorder)      Schizoaffective disorder (H)      Schizophrenia (H)      Vitamin D deficiency      History reviewed. No pertinent surgical history.    Allergies   Allergies   Allergen Reactions     Haloperidol Anaphylaxis       Physical Exam   Vital Signs with Ranges:  Temp:  [99.5  F (37.5  C)-99.6  F (37.6  C)] 99.6  F (37.6  C)  Pulse:  [60] 60  Heart Rate:  [134] 134  Resp:  [16] 16  BP: (107-121)/(82-87) 107/82  No intake/output data recorded.    Physical Exam   Constitutional: He appears well-developed. He appears distressed.   Eyes: EOM are normal. Pupils are equal, round, and reactive to light.   Neck: Normal range of motion.   Cardiovascular: Normal rate.   Pulmonary/Chest: Effort normal.   Abdominal: He exhibits no distension.    Musculoskeletal: Normal range of motion.   Neurological: He is alert.   Skin: Capillary refill takes less than 2 seconds. No pallor.   Psychiatric: His mood appears anxious. His affect is angry, labile and inappropriate. His speech is rapid and/or pressured. He is agitated, aggressive and hyperactive. Cognition and memory are impaired. He expresses impulsivity.       Data     IMAGING: (X-ray/CT/MRI)   No results found for this or any previous visit (from the past 24 hour(s)).    CBC with Diff:  Recent Labs   Lab Test 01/27/19  0604   WBC 11.3*   HGB 13.5   MCV 88         No results found for: RETICABSCT  No results found for: RETP    Lactic Acid:    No results found for: LACT        Comprehensive Metabolic Panel:  Recent Labs   Lab 01/28/19  0956 01/27/19  0604   NA  --  139   POTASSIUM 3.6 3.3*   CHLORIDE  --  106   CO2  --  26   ANIONGAP  --  7   GLC  --  94   BUN  --  10   CR  --  1.01   GFRESTIMATED  --  >90   GFRESTBLACK  --  >90   ELLEN  --  9.0       UA:  No results for input(s): COLOR, APPEARANCE, URINEGLC, URINEBILI, URINEKETONE, SG, UBLD, URINEPH, PROTEIN, UROBILINOGEN, NITRITE, LEUKEST, RBCU, WBCU in the last 168 hours.      Time Spent on this Encounter   I spent 30 minutes on the unit/floor managing the care of Sam Minor. 100% of my time was spent counseling the patient and/or coordinating care regarding services listed in this note.    CARLITOS Garcia, CNP  Hospitalist - [facility]  Text Page  [phone])

## 2019-01-30 PROCEDURE — 12400000 ZZH R&B MH

## 2019-01-30 PROCEDURE — 25000132 ZZH RX MED GY IP 250 OP 250 PS 637: Performed by: PSYCHIATRY & NEUROLOGY

## 2019-01-30 PROCEDURE — 90791 PSYCH DIAGNOSTIC EVALUATION: CPT

## 2019-01-30 RX ORDER — SALIVA STIMULANT COMB. NO.3
2 SPRAY, NON-AEROSOL (ML) MUCOUS MEMBRANE 4 TIMES DAILY PRN
Status: DISCONTINUED | OUTPATIENT
Start: 2019-01-30 | End: 2019-01-30

## 2019-01-30 RX ORDER — BENZTROPINE MESYLATE 0.5 MG/1
0.5 TABLET ORAL 2 TIMES DAILY PRN
Status: DISCONTINUED | OUTPATIENT
Start: 2019-01-30 | End: 2019-02-04 | Stop reason: HOSPADM

## 2019-01-30 RX ADMIN — METOPROLOL TARTRATE 25 MG: 25 TABLET ORAL at 13:14

## 2019-01-30 RX ADMIN — OMEPRAZOLE 20 MG: 20 CAPSULE, DELAYED RELEASE ORAL at 20:31

## 2019-01-30 RX ADMIN — CARBAMAZEPINE 200 MG: 200 CAPSULE, EXTENDED RELEASE ORAL at 20:31

## 2019-01-30 RX ADMIN — CARBAMAZEPINE 200 MG: 200 CAPSULE, EXTENDED RELEASE ORAL at 08:00

## 2019-01-30 RX ADMIN — QUETIAPINE FUMARATE 300 MG: 300 TABLET ORAL at 20:31

## 2019-01-30 RX ADMIN — OLANZAPINE 10 MG: 5 TABLET, ORALLY DISINTEGRATING ORAL at 00:41

## 2019-01-30 RX ADMIN — Medication 1 LOZENGE: at 13:14

## 2019-01-30 RX ADMIN — QUETIAPINE FUMARATE 100 MG: 100 TABLET ORAL at 08:00

## 2019-01-30 RX ADMIN — METOPROLOL TARTRATE 25 MG: 25 TABLET ORAL at 08:26

## 2019-01-30 ASSESSMENT — ACTIVITIES OF DAILY LIVING (ADL)
LAUNDRY: WITH SUPERVISION
DRESS: STREET CLOTHES
HYGIENE/GROOMING: INDEPENDENT
ORAL_HYGIENE: INDEPENDENT

## 2019-01-30 NOTE — PROGRESS NOTES
"[facility] Psychiatric Progress Note      Interval History:   Pt seen, team meeting held with , nursing staff, OT, and PA's to review diagnosis and treatment plan. Sam is feeling well today. Took a nap just prior to assessment this morning. Reports that he slept well and is feeling pretty clear, although the medication has made his legs twitch overnight and he feels that he would benefit from cogentin. States \"I'll feel really great when I go home and start smoking pot again as that takes my anxiety right down\". Staff report that although the pt has not been to groups he has been more appropriate in the last 24 hours. Taking medications as prescribed and tolerating them well overall although he notes that the Seroquel gives him cotton mouth.  Denies thoughts of suicide and self harm. Continues to experience hallucination, including hearing the voice of god, but overall feels well.    Review of systems:   The Review of Systems is negative other than noted in the HPI     Medications:       carBAMazepine  200 mg Oral BID     omeprazole  20 mg Oral QPM     QUEtiapine  100 mg Oral Daily     QUEtiapine  300 mg Oral At Bedtime     acetaminophen, hydrOXYzine, metoprolol tartrate, OLANZapine zydis **OR** OLANZapine, ranitidine    Mental Status Examination:     Appearance:  Awake, alert, adequately groomed and casually dressed in clothes from home. Bespectacled.   Eye Contact: Appropriate  Speech:  Clear, coherent  Psychomotor Behavior: No evidence of tardive dyskinesia, dystonia, or tics and fidgeting  Mood: Better.   Affect: Mood congruent, bright, expansive.     Thought Process:  Logical, linear and goal oriented no loose associations  Thought Content:  No evidence of suicidal ideation or homicidal ideation.  Admits to the presence of auditory hallucinations that are religiously focused  Oriented to:  time, person, and place  Attention Span and Concentration:  limited  Recent and Remote " Memory:  limited  Fund of Knowledge: appropriate  Muscle Strength and Tone: normal  Gait and Station: Normal  Insight:  fair  Judgment:  limited      Labs/Vitals:   No results found for this or any previous visit (from the past 24 hour(s)).  B/P: 128/92, T: 97.6, P: 59, R: 18    Impression:   Sam Minor is a 33-year-old single father of none with known history of mental illness previously assessed as bipolar 1 disorder and schizoaffective disorder and under the care of Keegan Crooks DO at [facility].  He had tapered and discontinued his Navane, which had kept him stable for about 12 years while under the supervision of his outpatient psychiatrist.  He tells me he is open to transitioning to a different medication since Navane is becoming unavailable, but his parents are insisting he remain on Navane on account of the stability he had enjoyed for several years.   1/28/19 Transitioned to carbamazepine   1/29/19 Navane discontinued      DIagnoses:     1.  Schizoaffective disorder, bipolar type, in acute episode.   2.  Cannabis use disorder.   3.  Alcohol use disorder, in remission.   4.  Gastroesophageal reflux disease.          Plan:   1. Written information given on medications. Side effects, risks, benefits reviewed.  2.  Continue hospitalization.  3.  Continue Seroquel to 300 mg nightly and 100 mg every morning.  4.  Continue Tegretol to 200 mg twice daily.  5.  Initiate Cogentin 0.5 mg PRN BID, and Biotene mouth spray.     Attestation:  Patient has been seen and evaluated by me,  Gladis Valentin MD    PATIENT ID  Name: Sam Minor  MRN:[id number]  YOB: 1985

## 2019-01-30 NOTE — PROGRESS NOTES
SPIRITUAL HEALTH SERVICES Progress Note  [facility]    Pt didn't express any pressing issues concerning his current condition or hospitalization. His fortino is informed by many [employer] traditions, but isn't expressed in any outward actions. Pt had strong opinions regarding staff and their procedures. SH provided listening support, reflective conversation and prayer. Pt knows [facility] is available upon further need.      Tutu Howell   Intern

## 2019-01-30 NOTE — PLAN OF CARE
Present on unit, cooperative with staff. No behavior problems observed. Speech is mildly pressured. Pacing in lounge, using headphones. Medication compliant. Reporting that the medication has helped him to calm his thoughts. Requesting metoprolol for anxiety which he reports great benefits, much more calm when taking. Did not attend groups. Adequate oral intake. Denies thoughts of self harm or suicide. Plan is to check tegretol level and potentially discharge home Friday.

## 2019-01-30 NOTE — PROGRESS NOTES
Patient sees Dr. Keegan Michelle at [facility] for medication management and wants to follow up with him.

## 2019-01-30 NOTE — PROGRESS NOTES
"Pt was observed pacing in the [facility] lounge shortly after midnight. His sleep was disrupted by another patient making noises. He says he suffers from insomnia. Hopes the HS Seroquel will help. Pt received 10 mg of Zyprexa Zydis PRN. Pt later reported \"the Zyprexa slowed my mind, but does not help with the insomnia.\" Pt was encouraged to decrease his stimuli in order to help achieve sleep.  "

## 2019-01-30 NOTE — PLAN OF CARE
"  Psychotic Symptoms  Psychotic Symptoms  Description  Signs and symptoms of listed problems will be absent or manageable.  1/29/2019 2120 - Improving by Liam Kat  Flowsheets  Taken 1/29/2019 2115   Psychotic Symptoms Assessed  all  Psychotic Symptoms Present  speech  Note  Pt was pleasant and present within the ITC. Pt presents with a full range affect and calm mood. Pt spent all shift in the lounge socializing with staff and peers while watching tv. Pt appears to be much more oriented and cognizant than yesterday. Pt denied hallucinations or Si. Pt stated that he knows he was \"off\" yesterday and feels much more balanced today. Pt exhibited better boundaries with his fellow peers this shift. Pt ate all of his food and was med compliant.      "

## 2019-01-30 NOTE — H&P
Case Management Psycho-Social Assessment    This information has been obtained from the patient's chart and from a personal interview with the patient.     Reason for Admission: Admitted to hospital with increasing symptoms of erum after tapering off psychiatric medications.    Previous Mental & Chemical Health: Has had previous hospitalizations- last in 7/2018 at [facility] Has been followed for 12 years outpatient by Dr.David Crooks at [facility] for psychiatry. Has had a therapist at [facility]- is no longer seeing her.   Used alcohol and marijuana extensively for years, starting in high school. Quit cigarettes 3 1/2 years ago. Quit alcohol 1 year ago. Still smokes pot daily. Says he is blessed with his father's intelligence and needs to smoke pot to slow down to a normal rate of thinking. Not interested in treatment.    Family History:  Grew up in [place] in an intact family with 1 younger brother. Remains close to family and lives with parents, Sam and Angelica. History of abuse or trauma denied.   Patient is single, never , with no children.    Current Living Situation:   Lives with Parents in [place]. His brother lives there as well.    Education and Work History:  Patient reports he was smoking pot and drinking in 11th grade- left school ([place]) and obtained his GED. No further formal education. 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 Is on SSDI. Is set up to become an [employer] - has all but one of the qualifications. Plans to pursue after discharge.  No  service.   Identifies as Lutheran- searching for a Gnosticist.  Enjoys [employer] games- has extensive set-up, likes to walk.     Insurance:  [facility]    Legal Issues / Guardian :   Denies legal issues. Patient is own guardian.      SS Assessment Needs & Plan:  Patient is less manic, is now on Tegretol, Seroquel and Metoprolol. He is thinking that is a good regimen and plans to  follow up with Dr. Crooks. He declines therapy referral- sees AA as a better alternative for him.

## 2019-01-31 PROCEDURE — 12400000 ZZH R&B MH

## 2019-01-31 PROCEDURE — 25000132 ZZH RX MED GY IP 250 OP 250 PS 637: Performed by: PSYCHIATRY & NEUROLOGY

## 2019-01-31 RX ADMIN — QUETIAPINE FUMARATE 100 MG: 100 TABLET ORAL at 08:12

## 2019-01-31 RX ADMIN — CARBAMAZEPINE 200 MG: 200 CAPSULE, EXTENDED RELEASE ORAL at 08:12

## 2019-01-31 RX ADMIN — OMEPRAZOLE 20 MG: 20 CAPSULE, DELAYED RELEASE ORAL at 16:27

## 2019-01-31 RX ADMIN — METOPROLOL TARTRATE 25 MG: 25 TABLET ORAL at 21:21

## 2019-01-31 RX ADMIN — METOPROLOL TARTRATE 25 MG: 25 TABLET ORAL at 11:14

## 2019-01-31 RX ADMIN — QUETIAPINE FUMARATE 300 MG: 300 TABLET ORAL at 21:19

## 2019-01-31 RX ADMIN — CARBAMAZEPINE 200 MG: 200 CAPSULE, EXTENDED RELEASE ORAL at 21:19

## 2019-01-31 RX ADMIN — METOPROLOL TARTRATE 25 MG: 25 TABLET ORAL at 12:36

## 2019-01-31 ASSESSMENT — ACTIVITIES OF DAILY LIVING (ADL)
ORAL_HYGIENE: INDEPENDENT
HYGIENE/GROOMING: INDEPENDENT
DRESS: STREET CLOTHES;INDEPENDENT
ORAL_HYGIENE: INDEPENDENT
LAUNDRY: WITH SUPERVISION
DRESS: STREET CLOTHES;INDEPENDENT
LAUNDRY: WITH SUPERVISION
HYGIENE/GROOMING: INDEPENDENT

## 2019-01-31 NOTE — PLAN OF CARE
Pt was out in the [facility] lounge for much of the shift this evening.  Social with staff and peers, reports feeling much better on his current medications.  Had visitor this evening for a short time.  He did pass a note to a staff member with a complement on it and his phone number.  Taking his offered meds. Pt is hoping to discharge home Thursday or Friday.

## 2019-01-31 NOTE — PLAN OF CARE
Adult Behavioral Health Plan of Care  Team Discussion  Description  Team Plan:  1/31/2019 1015 by Igor Gonzáles  Note  BEHAVIORAL TEAM DISCUSSION    Participants: Dr. Valentin, Nursing staff, Social workers and PA's  Progress: No change; baseline  Continued Stay Criteria/Rationale: Continue stay until discharge tomorrow  Medical/Physical:   Precautions:   Behavioral Orders   Procedures    Code 1 - Restrict to Unit    Routine Programming     As clinically indicated    Status 15     Every 15 minutes.     Plan: Discharge tomorrow. Pt doesn't want a therapist; would like to go to AA.   Rationale for change in precautions or plan: Baseline

## 2019-01-31 NOTE — PROGRESS NOTES
"[facility] Psychiatric Progress Note      Interval History:   Pt seen, team meeting held with , nursing staff, OT, and PA's to review diagnosis and treatment plan.  Patient reports he is doing well and feels he is ready to discharge.  He was advised that he would need to have his carbamazepine level drawn tomorrow.  He continues to endorse the presence of auditory hallucinations a desire to return to smoking marijuana once he gets out of the hospital.  He admits that his thoughts are not as pressured and he is able to concentrate better.  However he is not interested in participating and group therapy and declines referral to an outpatient therapist.  He feels that getting a serum carbamazepine level is not necessary because \"I have to know the level of my head first for it to be useful.\"  For some reason he feels that he can tell mentally, when the carbamazepine level is appropriate.   Review of systems:   The Review of Systems is negative other than noted in the HPI     Medications:       carBAMazepine  200 mg Oral BID     omeprazole  20 mg Oral QPM     QUEtiapine  100 mg Oral Daily     QUEtiapine  300 mg Oral At Bedtime     acetaminophen, artificial saliva, sore throat lozenge, benztropine, hydrOXYzine, metoprolol tartrate, OLANZapine zydis **OR** OLANZapine, ranitidine    Mental Status Examination:     Appearance:  Awake, alert, adequately groomed and casually dressed in clothes from home. Bespectacled.   Eye Contact: Appropriate  Speech:  Clear, coherent  Psychomotor Behavior: No evidence of tardive dyskinesia, dystonia, or tics and fidgeting  Mood: Better.   Affect: Labile and expansive.     Thought Process:  Logical, linear and goal oriented no loose associations  Thought Content:  No evidence of suicidal ideation or homicidal ideation.  Admits to the presence of auditory hallucinations that are religiously focused albeit to a lesser degree  Oriented to:  time, person, and " place  Attention Span and Concentration:  limited  Recent and Remote Memory:  limited  Fund of Knowledge: appropriate  Muscle Strength and Tone: normal  Gait and Station: Normal  Insight:  fair  Judgment:  limited      Labs/Vitals:   No results found for this or any previous visit (from the past 24 hour(s)).  B/P: 128/92, T: 97.6, P: 59, R: 18    Impression:   Sam Minor is a 33-year-old single father of none with known history of mental illness previously assessed as bipolar 1 disorder and schizoaffective disorder and under the care of Keegan Crooks DO at [facility].  He had tapered and discontinued his Navane, which had kept him stable for about 12 years while under the supervision of his outpatient psychiatrist.  He tells me he is open to transitioning to a different medication since Navane is becoming unavailable, but his parents are insisting he remain on Navane on account of the stability he had enjoyed for several years.   1/28/19 Transitioned to carbamazepine   1/29/19 Navane discontinued      DIagnoses:     1.  Schizoaffective disorder, bipolar type, in acute episode.   2.  Cannabis use disorder.   3.  Alcohol use disorder, in remission.   4.  Gastroesophageal reflux disease.          Plan:   1. Written information given on medications. Side effects, risks, benefits reviewed.  2.  Continue hospitalization.  3.  Check carbamazepine level and CBC plus differential tomorrow.  Patient is hoping for discharge tomorrow.    Attestation:  Patient has been seen and evaluated by me,  Gladis Valentin MD    PATIENT ID  Name: Sam Minor  MRN:[id number]  YOB: 1985

## 2019-01-31 NOTE — PLAN OF CARE
Pt has been present and pleasant in the ITC throughout the day shift. Respectful to peers and staff. Distractible, but is able to concentrate. Presents an anxious and restless affect but has remained calm and collected. Med compliant. Showered around 0830. Adequate for discharge tomorrow.

## 2019-02-01 LAB
BASOPHILS # BLD AUTO: 0.1 10E9/L (ref 0–0.2)
BASOPHILS NFR BLD AUTO: 0.8 %
CARBAMAZEPINE SERPL-MCNC: 8 MG/L (ref 4–12)
DIFFERENTIAL METHOD BLD: ABNORMAL
EOSINOPHIL # BLD AUTO: 0.3 10E9/L (ref 0–0.7)
EOSINOPHIL NFR BLD AUTO: 3.5 %
ERYTHROCYTE [DISTWIDTH] IN BLOOD BY AUTOMATED COUNT: 12.8 % (ref 10–15)
HCT VFR BLD AUTO: 39.4 % (ref 40–53)
HGB BLD-MCNC: 13.8 G/DL (ref 13.3–17.7)
IMM GRANULOCYTES # BLD: 0 10E9/L (ref 0–0.4)
IMM GRANULOCYTES NFR BLD: 0.1 %
LYMPHOCYTES # BLD AUTO: 2.7 10E9/L (ref 0.8–5.3)
LYMPHOCYTES NFR BLD AUTO: 35 %
MCH RBC QN AUTO: 31.2 PG (ref 26.5–33)
MCHC RBC AUTO-ENTMCNC: 35 G/DL (ref 31.5–36.5)
MCV RBC AUTO: 89 FL (ref 78–100)
MONOCYTES # BLD AUTO: 0.9 10E9/L (ref 0–1.3)
MONOCYTES NFR BLD AUTO: 11.8 %
NEUTROPHILS # BLD AUTO: 3.7 10E9/L (ref 1.6–8.3)
NEUTROPHILS NFR BLD AUTO: 48.8 %
NRBC # BLD AUTO: 0 10*3/UL
NRBC BLD AUTO-RTO: 0 /100
PLATELET # BLD AUTO: 307 10E9/L (ref 150–450)
RBC # BLD AUTO: 4.42 10E12/L (ref 4.4–5.9)
WBC # BLD AUTO: 7.6 10E9/L (ref 4–11)

## 2019-02-01 PROCEDURE — 80156 ASSAY CARBAMAZEPINE TOTAL: CPT | Performed by: PSYCHIATRY & NEUROLOGY

## 2019-02-01 PROCEDURE — 36415 COLL VENOUS BLD VENIPUNCTURE: CPT | Performed by: PSYCHIATRY & NEUROLOGY

## 2019-02-01 PROCEDURE — 12400000 ZZH R&B MH

## 2019-02-01 PROCEDURE — 25000132 ZZH RX MED GY IP 250 OP 250 PS 637: Performed by: PSYCHIATRY & NEUROLOGY

## 2019-02-01 PROCEDURE — 85025 COMPLETE CBC W/AUTO DIFF WBC: CPT | Performed by: PSYCHIATRY & NEUROLOGY

## 2019-02-01 RX ADMIN — METOPROLOL TARTRATE 25 MG: 25 TABLET ORAL at 17:27

## 2019-02-01 RX ADMIN — METOPROLOL TARTRATE 25 MG: 25 TABLET ORAL at 07:50

## 2019-02-01 RX ADMIN — OMEPRAZOLE 20 MG: 20 CAPSULE, DELAYED RELEASE ORAL at 17:27

## 2019-02-01 RX ADMIN — QUETIAPINE FUMARATE 500 MG: 400 TABLET ORAL at 21:12

## 2019-02-01 RX ADMIN — OMEPRAZOLE 20 MG: 20 CAPSULE, DELAYED RELEASE ORAL at 07:50

## 2019-02-01 RX ADMIN — METOPROLOL TARTRATE 25 MG: 25 TABLET ORAL at 13:44

## 2019-02-01 ASSESSMENT — ACTIVITIES OF DAILY LIVING (ADL)
DRESS: INDEPENDENT
ORAL_HYGIENE: INDEPENDENT
LAUNDRY: WITH SUPERVISION
ORAL_HYGIENE: INDEPENDENT
LAUNDRY: WITH SUPERVISION
HYGIENE/GROOMING: INDEPENDENT
HYGIENE/GROOMING: INDEPENDENT
DRESS: STREET CLOTHES;INDEPENDENT

## 2019-02-01 NOTE — PLAN OF CARE
Pt spent much of his day in the [facility] lounge, more social with peers.  At times patient was a little intrusive when asking about other pt care, was accepting of redirection.  He was accepting of his offered medications but reported he has some questions for the doctor prior to discharge tomorrow.  Contracts for safety.

## 2019-02-01 NOTE — PLAN OF CARE
Pt has been up ad altagracia throughout the day shift. Presents a blunted affect, but brightens upon approach and communication. No groups. Showered; attire is appropriate to age and situation. Respectful to peers and staff. Med compliant. Has been keeping to himself and pacing periodically throughout the lounge with headphones on.

## 2019-02-01 NOTE — PROGRESS NOTES
[facility] [place] [facility] Psychiatric Progress Note      Interval History:   Pt seen, team meeting held with , nursing staff, OT, and PA's to review diagnosis and treatment plan.  Patient reports that he is doing better.  He states he does not want to take currently the pain over the weekend because he wants to know how he does on just to Seroquel.  He states his plan was to get off of Navane because of the side effects and feels he now has achieved that.  He states nothing has ever been able to stop the auditory hallucinations that he experiences but he admits that his racing thoughts have diminished.  He states he has not experienced any side effects from carbamazepine.  He was advised that his outpatient psychiatrist would like him to remain on a mood stabilizer but the patient insists that the ultimate decision with regard to medications is his.  He does not endorse current self-harm thoughts plans or intent attempts to reach his father at [phone] was unsuccessful and voicemail was left for him.  Patient reports future orientation and a desire to be discharged on Sunday.  He was advised that this was unlikely as the undersigned would not be working this weekend.  Patient verbalized understanding but insisted on speaking with the on-call psychiatrist over the weekend. We discussed the option of raising his Seroquel dose to 500 mg at bedtime given the fact that he does not want to take Tegretol over the weeke.nd.   Review of systems:   The Review of Systems is negative other than noted in the HPI     Medications:       carBAMazepine  200 mg Oral BID     omeprazole  20 mg Oral BID AC     QUEtiapine  300 mg Oral At Bedtime     acetaminophen, artificial saliva, sore throat lozenge, benztropine, hydrOXYzine, metoprolol tartrate, OLANZapine zydis **OR** OLANZapine, ranitidine    Mental Status Examination:     Appearance:  Awake, alert, adequately groomed and casually dressed in clothes from home.  Bespectacled.   Eye Contact: Appropriate  Speech:  Clear, coherent  Psychomotor Behavior: No evidence of tardive dyskinesia, dystonia, or tics and fidgeting  Mood: Better.   Affect: Mood congruent with labile affect.     Thought Process:  Logical, linear and goal oriented no loose associations  Thought Content:  No evidence of suicidal ideation or homicidal ideation.  Admits to the presence of auditory hallucinations that are religiously focused albeit to a lesser degree  Oriented to:  time, person, and place  Attention Span and Concentration:  limited  Recent and Remote Memory:  limited  Fund of Knowledge: appropriate  Muscle Strength and Tone: normal  Gait and Station: Normal  Insight:  fair  Judgment:  limited      Labs/Vitals:     Recent Results (from the past 24 hour(s))   Carbamazepine total    Collection Time: 02/01/19  7:26 AM   Result Value Ref Range    Carbamazepine Level 8.0 4.0 - 12.0 mg/L   CBC with platelets differential    Collection Time: 02/01/19  7:26 AM   Result Value Ref Range    WBC 7.6 4.0 - 11.0 10e9/L    RBC Count 4.42 4.4 - 5.9 10e12/L    Hemoglobin 13.8 13.3 - 17.7 g/dL    Hematocrit 39.4 (L) 40.0 - 53.0 %    MCV 89 78 - 100 fl    MCH 31.2 26.5 - 33.0 pg    MCHC 35.0 31.5 - 36.5 g/dL    RDW 12.8 10.0 - 15.0 %    Platelet Count 307 150 - 450 10e9/L    Diff Method Automated Method     % Neutrophils 48.8 %    % Lymphocytes 35.0 %    % Monocytes 11.8 %    % Eosinophils 3.5 %    % Basophils 0.8 %    % Immature Granulocytes 0.1 %    Nucleated RBCs 0 0 /100    Absolute Neutrophil 3.7 1.6 - 8.3 10e9/L    Absolute Lymphocytes 2.7 0.8 - 5.3 10e9/L    Absolute Monocytes 0.9 0.0 - 1.3 10e9/L    Absolute Eosinophils 0.3 0.0 - 0.7 10e9/L    Absolute Basophils 0.1 0.0 - 0.2 10e9/L    Abs Immature Granulocytes 0.0 0 - 0.4 10e9/L    Absolute Nucleated RBC 0.0      B/P: 128/92, T: 97.6, P: 59, R: 18    Impression:   Sam CONNOLLY Iván is a 33-year-old single father of none with known history of mental  illness previously assessed as bipolar 1 disorder and schizoaffective disorder and under the care of Keegan Crooks DO at [facility].  He had tapered and discontinued his Navane, which had kept him stable for about 12 years while under the supervision of his outpatient psychiatrist.  He tells me he is open to transitioning to a different medication since Navane is becoming unavailable, but his parents are insisting he remain on Navane on account of the stability he had enjoyed for several years.   1/28/19 Transitioned to carbamazepine   1/29/19 Navane discontinued      DIagnoses:     1.  Schizoaffective disorder, bipolar type, in acute episode.   2.  Cannabis use disorder.   3.  Alcohol use disorder, in remission.   4.  Gastroesophageal reflux disease.          Plan:   1. Written information given on medications. Side effects, risks, benefits reviewed.  2.  Continue hospitalization.  3.  Increase Seroquel to 500 mg p.o. nightly but maintain the carbamazepine on his medication regimen.    Attestation:  Patient has been seen and evaluated by Gladis law MD    PATIENT ID  Name: Sam Minor  MRN:[id number]  YOB: 1985

## 2019-02-02 PROCEDURE — 25000132 ZZH RX MED GY IP 250 OP 250 PS 637: Performed by: PSYCHIATRY & NEUROLOGY

## 2019-02-02 PROCEDURE — 12400000 ZZH R&B MH

## 2019-02-02 RX ADMIN — METOPROLOL TARTRATE 25 MG: 25 TABLET ORAL at 21:15

## 2019-02-02 RX ADMIN — QUETIAPINE FUMARATE 500 MG: 400 TABLET ORAL at 21:15

## 2019-02-02 RX ADMIN — METOPROLOL TARTRATE 25 MG: 25 TABLET ORAL at 08:37

## 2019-02-02 RX ADMIN — METOPROLOL TARTRATE 25 MG: 25 TABLET ORAL at 17:08

## 2019-02-02 RX ADMIN — OMEPRAZOLE 20 MG: 20 CAPSULE, DELAYED RELEASE ORAL at 08:37

## 2019-02-02 RX ADMIN — OMEPRAZOLE 20 MG: 20 CAPSULE, DELAYED RELEASE ORAL at 17:08

## 2019-02-02 ASSESSMENT — ACTIVITIES OF DAILY LIVING (ADL)
HYGIENE/GROOMING: INDEPENDENT
DRESS: INDEPENDENT
ORAL_HYGIENE: INDEPENDENT
LAUNDRY: WITH SUPERVISION
DRESS: INDEPENDENT;STREET CLOTHES
LAUNDRY: WITH SUPERVISION
ORAL_HYGIENE: INDEPENDENT
HYGIENE/GROOMING: INDEPENDENT

## 2019-02-02 NOTE — PLAN OF CARE
"Pt was up early showered and went back to bed.  At breakfast he was social with staff and peers.  He again was selective when taking his medications and refused his CARBATROL, staff provided education but pt declined, \"I don't like the levels, it make me feel flat then POOF all dark thoughts\".  Spent time in the lounge with peers watching a film.  When in his room he was looking out the window and appeared to be laughing to himself.  No disruptive behaviors, did not attend unit programing.    "

## 2019-02-02 NOTE — PLAN OF CARE
"Bedresting at beginning of shift , then was out in lounge sometimes walking around with headphones  and minimally social. Was pleasant and respectful. Had flat affect and was appeared internally preoccupied. Was withdrawn and  minimally social. Only auditory hallucination he said he had was \" the voice of God\". Father visited and this went well. Stated' I stopped drinking a year ago and attend AA. I stopped smoking 4 yrs ago after I got pneumonia. I will never stop smoking marijuana because it brings me closer to God\". \" have been hearing the voice of God for 16 years now. Its a good thing- kind of rushing sound emotion\" . Refused HS Tegretol.   "

## 2019-02-03 PROCEDURE — 12400000 ZZH R&B MH

## 2019-02-03 PROCEDURE — 25000132 ZZH RX MED GY IP 250 OP 250 PS 637: Performed by: PSYCHIATRY & NEUROLOGY

## 2019-02-03 RX ADMIN — OMEPRAZOLE 20 MG: 20 CAPSULE, DELAYED RELEASE ORAL at 08:30

## 2019-02-03 RX ADMIN — METOPROLOL TARTRATE 25 MG: 25 TABLET ORAL at 08:30

## 2019-02-03 RX ADMIN — OMEPRAZOLE 20 MG: 20 CAPSULE, DELAYED RELEASE ORAL at 17:23

## 2019-02-03 RX ADMIN — METOPROLOL TARTRATE 25 MG: 25 TABLET ORAL at 15:49

## 2019-02-03 RX ADMIN — QUETIAPINE FUMARATE 500 MG: 400 TABLET ORAL at 21:32

## 2019-02-03 RX ADMIN — METOPROLOL TARTRATE 25 MG: 25 TABLET ORAL at 21:41

## 2019-02-03 ASSESSMENT — ACTIVITIES OF DAILY LIVING (ADL)
ORAL_HYGIENE: INDEPENDENT
HYGIENE/GROOMING: INDEPENDENT
DRESS: STREET CLOTHES;INDEPENDENT
LAUNDRY: WITH SUPERVISION

## 2019-02-03 NOTE — PLAN OF CARE
"In room often, withdrawn and preoccupied. Sometimes out in lounge and watching TV or walking around with headphones. Blunt affect and brightens on approach. Says \" I feel fine. I hope to get discharged Monday. \". Denies voices except for \" Hearing the voice of God and that has been going on for 16 years. \". Minimally social , pleasant and cooperative.  "

## 2019-02-03 NOTE — PLAN OF CARE
"Up early again to shower, in the lounge only a short time to eat and make some small talk with peers.  He spent a bulk of the shift resting in his room.  After lunch he did inform staff \"Tell the doctor I am ready to discharge Monday\"  Pt again refused his scheduled carBAMazepine. Pt did not attend any unit programing.    "

## 2019-02-04 VITALS
DIASTOLIC BLOOD PRESSURE: 87 MMHG | WEIGHT: 173.8 LBS | HEIGHT: 71 IN | HEART RATE: 110 BPM | BODY MASS INDEX: 24.33 KG/M2 | RESPIRATION RATE: 16 BRPM | SYSTOLIC BLOOD PRESSURE: 129 MMHG | TEMPERATURE: 97.9 F | OXYGEN SATURATION: 98 %

## 2019-02-04 PROCEDURE — 25000132 ZZH RX MED GY IP 250 OP 250 PS 637: Performed by: PSYCHIATRY & NEUROLOGY

## 2019-02-04 RX ORDER — CARBAMAZEPINE 200 MG/1
200 CAPSULE, EXTENDED RELEASE ORAL 2 TIMES DAILY
Qty: 60 CAPSULE | Refills: 0
Start: 2019-02-04 | End: 2021-06-29

## 2019-02-04 RX ORDER — QUETIAPINE FUMARATE 100 MG/1
500 TABLET, FILM COATED ORAL AT BEDTIME
Qty: 150 TABLET | Refills: 0 | Status: SHIPPED | OUTPATIENT
Start: 2019-02-04 | End: 2021-06-29

## 2019-02-04 RX ADMIN — METOPROLOL TARTRATE 25 MG: 25 TABLET ORAL at 08:39

## 2019-02-04 RX ADMIN — OMEPRAZOLE 20 MG: 20 CAPSULE, DELAYED RELEASE ORAL at 08:34

## 2019-02-04 RX ADMIN — METOPROLOL TARTRATE 25 MG: 25 TABLET ORAL at 16:41

## 2019-02-04 ASSESSMENT — ACTIVITIES OF DAILY LIVING (ADL)
ORAL_HYGIENE: INDEPENDENT
HYGIENE/GROOMING: INDEPENDENT
DRESS: STREET CLOTHES
LAUNDRY: WITH SUPERVISION

## 2019-02-04 NOTE — DISCHARGE INSTRUCTIONS
Behavioral Discharge Planning and Instructions    Summary: Admitted to hospital with manic episode after tapering off psychiatric medications.    Main Diagnosis: Schizoaffective disorder, bipolar type; Cannabis use disorder; Alcohol use disorder, in remission.     Major Treatments, Procedures and Findings: Psychiatric assessment; medication change    Symptoms to Report: feeling more aggressive, increased confusion,feeling excessively energized or mood getting worse    Lifestyle Adjustment: Follow up with medication management, working closely with your provider. Sober lifestyle recommended- refraining from use of marijuana as well as alcohol..Follow up with AA as you suggested.  Consider a support group through [facility] (see below).    Psychiatry Follow-up:     Dr. Keegan Crooks, psychiatry- appointment on Friday 2/22/19 @ 11 am.  [facility] [address]  [place], [place] [address] [phone] fax: [phone]    Resources:     [facility]-   Crisis Intervention: 925.936.1391 or 970-722-4627 (TTY: 216.981.3970).  Call anytime for help.  National [place] on Mental Illness (www.[place].bethany.org): 700.853.1892 or 315-588-0243.  Alcoholics Anonymous (www.alcoholics-anonymous.org): Check your phone book for your local chapter.  National Suicide Prevention Line (www.mentalhealthmn.org): 892-441-CACR (4328)    General Medication Instructions:   See your medication sheet(s) for instructions.   Take all medicines as directed.  Make no changes unless your doctor suggests them.   Go to all your doctor visits.  Be sure to have all your required lab tests. This way, your medicines can be refilled on time.  Do not use any drugs not prescribed by your doctor.  Avoid alcohol.

## 2019-02-04 NOTE — PLAN OF CARE
Pt pleasant, affect full range. Observed on the unit interacting with peers, listening to music. Pt refused Carbatrol, stating he does not take that anymore. Pt requesting to speak with attending provider, and declined to fill out menu because he anticipates discharging today. AVS is complete but no orders for discharge; Dr. Valentin called.

## 2019-02-04 NOTE — PLAN OF CARE
"Pt presented with a full range affect (blunt and flat at times) and anxious mood. Pt was visible on the unit, watching TV and socializing with peers. Pt reported that he hears God constantly and that he is happy to be \"in touch with my brother Anshul\". Pt is calm upon approach and is respectful. Pt denied SI, ate his meal and was med compliant.   "

## 2019-02-04 NOTE — PLAN OF CARE
"Goal: Team Discussion  Outcome: Adequate for discharge  BEHAVIORAL TEAM DISCUSSION     Participants: Dr. Valentin, nurses, social workers, psych asst   Progress: Pt wants to go home, pt is still hearing voices and hallucinating. Pt states that he \"hears the lords voice,\" pt does not take his tegretol because he believes that he does not need it.    Continued Stay Criteria/Rationale: Pt is being discharged with Seroquel, that is the only medication pt is willing to take.   Medical/Physical:N/A  Precautions:        Behavioral Orders   Procedures    Code 1 - Restrict to Unit    Routine Programming       As clinically indicated    Seizure precautions    Status 15       Every 15 minutes.      Plan: Pt will be discharging today, pt is still hallucinating and hearing voices. Pt is declining some of his medication, pt is fine with taking Seroquel   Rationale for change in precautions or plan: Pt will be discharged today.        "

## 2019-02-05 NOTE — PROGRESS NOTES
Patient is discharged to home and accompanied with his Mother. Denies feeling suicidal and states he has learned new coping skills while hospitalized. Went through discharged instructions and states he understands information. All personal belongings sent home with patient.

## 2019-02-12 NOTE — DISCHARGE SUMMARY
Admit Date:     01/27/2019   Discharge Date:     02/04/2019      DISCHARGE DIAGNOSES:   1.  Schizoaffective disorder, bipolar type in acute episode.   2.  Cannabis use disorder.   3.  Alcohol use disorder in remission.   4.  Gastroesophageal reflux disease.      REASON FOR REFERRAL Sam Minor is a 33-year-old single male with known history of mental illness previously assessed as bipolar 1 disorder and schizoaffective disorder under the care of Keegan Crooks DO at [facility] in the [facility].  He had tapered and discontinued his prescribed Navane, which had kept him stable for about 12 years, while under the supervision of his psychiatrist.  He became acutely psychotic with Yarsanism preoccupation and grandiose delusions and presented to [facility] ED by EMS on account of his inability to function at home.  He resides at home with his parents.      CHIEF COMPLAINT:  Pari.      HISTORY OF PRESENT ILLNESS:  I refer the reader to the psychiatric evaluation documented by Gladis Valentin MD on 01/28/2019  in addition to the history and physical examination completed by Joan Jimenez PA-C 01/27/2019 and attested by Tao Blair MD, also refer to the case management and psychosocial assessment completed by Silvana Sanchez, , on 01/30/2019.      HOSPITAL COURSE:  Following admission to the mental health unit, the patient was given routine of the Intensive Treatment Center on account of his acute psychosis and severity of his manic symptoms.  He was very disorganized and hyperverbal, displaying poor boundaries, and Yarsanism preoccupation.  He did not want to resume previously prescribed Navane, believing that the manufacturers of the medication had formulated it such that users become dependent on it so that they can control them.  Collateral information provided by his psychiatrist suggested that he had done well on medication, but on  account of its unavailability he recommended that the patient commence Seroquel and a primary mood stabilizer.  The patient refused to consider lithium or Depakote on account of previous experiences and consequently he was offered carbamazepine.  He consented to the medication for a couple days after which he refused to take it.  He continued to take Seroquel at bedtime, but he believed that he was only taken it for sleep and not for his psychosis as he did not believe that he had mental illness.  As far he was concerned he was experiencing racing thoughts because he had not slept and believed that no medication had ever taken away his auditory hallucinations, which he believed he was hearing the voice of God communicating with him.  Throughout his stay in the hospital, he was redirectable.  His father tried to convince him to take medications as prescribed, but he stuck to his guns and only took the medications that he wanted to take At the point of discharge, he is carbamazepine level  8.0 and his blood count was within normal limits.  He discharged from the hospital to 2019 uneventfully and was picked up from the hospital by his mother.      DISCHARGE MEDICATIONS:  Artificial saliva spray 1 spray every 6 hours p.r.n. dry mouth, carbamazepine 200 mg p.o. b.i.d., Seroquel 500 mg p.o. each day at bedtime.      DISPOSITION:  The patient was discharged to follow up with Dr. Keegan Crooks on 19 at 11:00 a.m. at [facility] in [place], [place].      Time spent was 35 minutes, more than 50% of the time spent in counseling and care coordination.         WILLIE MANDUJANO MD             D: 2019   T: 2019   MT:       Name:     SHAI WOODY   MRN:      [id number]        Account:        [id number]   :      1985           Admit Date:     2019                                  Discharge Date: 2019      Document: [id number]       cc: Keegan  Valeriy STAFFORD

## 2021-06-28 ENCOUNTER — HOSPITAL ENCOUNTER (EMERGENCY)
Facility: CLINIC | Age: 36
Discharge: PSYCHIATRIC OR SUBSTANCE USE FACILITY | End: 2021-06-29
Attending: EMERGENCY MEDICINE | Admitting: EMERGENCY MEDICINE
Payer: COMMERCIAL

## 2021-06-28 DIAGNOSIS — F29 PSYCHOSIS, UNSPECIFIED PSYCHOSIS TYPE (H): ICD-10-CM

## 2021-06-28 DIAGNOSIS — F25.0 SCHIZOAFFECTIVE DISORDER, BIPOLAR TYPE (H): Primary | ICD-10-CM

## 2021-06-28 LAB
AMPHETAMINES UR QL SCN: NEGATIVE
BARBITURATES UR QL: NEGATIVE
BENZODIAZ UR QL: NEGATIVE
CANNABINOIDS UR QL SCN: POSITIVE
COCAINE UR QL: NEGATIVE
LABORATORY COMMENT REPORT: NORMAL
OPIATES UR QL SCN: NEGATIVE
PCP UR QL SCN: NEGATIVE
SARS-COV-2 RNA RESP QL NAA+PROBE: NEGATIVE
SPECIMEN SOURCE: NORMAL

## 2021-06-28 PROCEDURE — 90791 PSYCH DIAGNOSTIC EVALUATION: CPT

## 2021-06-28 PROCEDURE — 250N000013 HC RX MED GY IP 250 OP 250 PS 637: Performed by: EMERGENCY MEDICINE

## 2021-06-28 PROCEDURE — 99285 EMERGENCY DEPT VISIT HI MDM: CPT | Mod: 25

## 2021-06-28 PROCEDURE — 80307 DRUG TEST PRSMV CHEM ANLYZR: CPT | Performed by: EMERGENCY MEDICINE

## 2021-06-28 PROCEDURE — 87635 SARS-COV-2 COVID-19 AMP PRB: CPT | Performed by: EMERGENCY MEDICINE

## 2021-06-28 PROCEDURE — C9803 HOPD COVID-19 SPEC COLLECT: HCPCS

## 2021-06-28 RX ORDER — DIPHENHYDRAMINE HCL 25 MG
25 CAPSULE ORAL
Status: DISCONTINUED | OUTPATIENT
Start: 2021-06-28 | End: 2021-06-29 | Stop reason: HOSPADM

## 2021-06-28 RX ORDER — IBUPROFEN 600 MG/1
600 TABLET, FILM COATED ORAL EVERY 4 HOURS PRN
Status: DISCONTINUED | OUTPATIENT
Start: 2021-06-28 | End: 2021-06-29 | Stop reason: HOSPADM

## 2021-06-28 RX ORDER — OLANZAPINE 5 MG/1
10 TABLET ORAL 2 TIMES DAILY
Status: DISCONTINUED | OUTPATIENT
Start: 2021-06-28 | End: 2021-06-29 | Stop reason: HOSPADM

## 2021-06-28 RX ORDER — ACETAMINOPHEN 325 MG/1
650 TABLET ORAL EVERY 4 HOURS PRN
Status: DISCONTINUED | OUTPATIENT
Start: 2021-06-28 | End: 2021-06-29 | Stop reason: HOSPADM

## 2021-06-28 RX ORDER — LANOLIN ALCOHOL/MO/W.PET/CERES
3 CREAM (GRAM) TOPICAL
Status: DISCONTINUED | OUTPATIENT
Start: 2021-06-28 | End: 2021-06-29 | Stop reason: HOSPADM

## 2021-06-28 RX ORDER — LORAZEPAM 1 MG/1
1 TABLET ORAL ONCE
Status: COMPLETED | OUTPATIENT
Start: 2021-06-28 | End: 2021-06-28

## 2021-06-28 RX ORDER — OLANZAPINE 10 MG/2ML
10 INJECTION, POWDER, FOR SOLUTION INTRAMUSCULAR 2 TIMES DAILY PRN
Status: DISCONTINUED | OUTPATIENT
Start: 2021-06-28 | End: 2021-06-29 | Stop reason: HOSPADM

## 2021-06-28 RX ADMIN — DIPHENHYDRAMINE HYDROCHLORIDE 25 MG: 25 CAPSULE ORAL at 22:17

## 2021-06-28 RX ADMIN — LORAZEPAM 1 MG: 1 TABLET ORAL at 17:30

## 2021-06-28 ASSESSMENT — ENCOUNTER SYMPTOMS: HALLUCINATIONS: 1

## 2021-06-28 ASSESSMENT — MIFFLIN-ST. JEOR: SCORE: 1713.24
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
## 2021-06-28 NOTE — ED NOTES
Bed: [facility]  Expected date:   Expected time:   Means of arrival:   Comments:  [facility] - 418 - 36 M psych eval on hold rita. Eta 141

## 2021-06-28 NOTE — ED NOTES
"Pt continues to pace back and forth, coming to window stating \"what up dude?, so can I get my benadryl yet?\"  I told patient he could have some zyprexa and he said \"i'm not taking that poison\". Pt then goes back to room and takes shirt off and sitting in corner meditating.   "
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
## 2021-06-28 NOTE — ED NOTES
"Pt came to window stating \"I need to be alone to meditate and I will be doing that'. Pt went into room and got completely undressed and sat on floor crossed legs looking into corner meditating. Pt then got dressed, came out of room, went back and undressed again. DEC arrived and patient got dressed and sitting talking with DEC.  "

## 2021-06-28 NOTE — ED TRIAGE NOTES
Pt's father called PD to respond to his home for the welfare check on pt. Pt is recently bipolar and is not currently taking meds. Pt was upped his dosage of Risperdal but did not fill his last Rx. Pt is talking non-stop, even having conversations with people who are not there.

## 2021-06-29 ENCOUNTER — HOSPITAL ENCOUNTER (INPATIENT)
Facility: CLINIC | Age: 36
LOS: 24 days | Discharge: HOME | DRG: 885 | End: 2021-07-23
Attending: PSYCHIATRY & NEUROLOGY | Admitting: PSYCHIATRY & NEUROLOGY
Payer: COMMERCIAL

## 2021-06-29 VITALS
WEIGHT: 170 LBS | RESPIRATION RATE: 18 BRPM | BODY MASS INDEX: 23.8 KG/M2 | HEIGHT: 71 IN | OXYGEN SATURATION: 100 % | SYSTOLIC BLOOD PRESSURE: 136 MMHG | TEMPERATURE: 98.1 F | DIASTOLIC BLOOD PRESSURE: 93 MMHG | HEART RATE: 102 BPM

## 2021-06-29 DIAGNOSIS — F30.9 MANIA (H): Primary | ICD-10-CM

## 2021-06-29 PROCEDURE — 250N000013 HC RX MED GY IP 250 OP 250 PS 637: Performed by: EMERGENCY MEDICINE

## 2021-06-29 PROCEDURE — 99222 1ST HOSP IP/OBS MODERATE 55: CPT | Mod: AI | Performed by: PSYCHIATRY & NEUROLOGY

## 2021-06-29 PROCEDURE — 124N000002 HC R&B MH UMMC

## 2021-06-29 RX ORDER — MULTIPLE VITAMINS W/ MINERALS TAB 9MG-400MCG
1 TAB ORAL DAILY
Status: ON HOLD | COMMUNITY
End: 2021-07-23

## 2021-06-29 RX ORDER — OLANZAPINE 20 MG/1
20 TABLET ORAL AT BEDTIME
COMMUNITY
End: 2021-06-29

## 2021-06-29 RX ORDER — OLANZAPINE 10 MG/2ML
10 INJECTION, POWDER, FOR SOLUTION INTRAMUSCULAR 3 TIMES DAILY PRN
Status: DISCONTINUED | OUTPATIENT
Start: 2021-06-29 | End: 2021-07-23 | Stop reason: HOSPADM

## 2021-06-29 RX ORDER — HYDROXYZINE HYDROCHLORIDE 25 MG/1
25 TABLET, FILM COATED ORAL EVERY 4 HOURS PRN
Status: DISCONTINUED | OUTPATIENT
Start: 2021-06-29 | End: 2021-07-23 | Stop reason: HOSPADM

## 2021-06-29 RX ORDER — MAGNESIUM HYDROXIDE/ALUMINUM HYDROXICE/SIMETHICONE 120; 1200; 1200 MG/30ML; MG/30ML; MG/30ML
30 SUSPENSION ORAL EVERY 4 HOURS PRN
Status: DISCONTINUED | OUTPATIENT
Start: 2021-06-29 | End: 2021-07-23 | Stop reason: HOSPADM

## 2021-06-29 RX ORDER — OMEPRAZOLE 40 MG/1
40 CAPSULE, DELAYED RELEASE ORAL DAILY
COMMUNITY

## 2021-06-29 RX ORDER — ACETAMINOPHEN 325 MG/1
650 TABLET ORAL EVERY 4 HOURS PRN
Status: DISCONTINUED | OUTPATIENT
Start: 2021-06-29 | End: 2021-07-23 | Stop reason: HOSPADM

## 2021-06-29 RX ORDER — POLYETHYLENE GLYCOL 3350 17 G/17G
1 POWDER, FOR SOLUTION ORAL DAILY
Status: ON HOLD | COMMUNITY
End: 2021-07-23

## 2021-06-29 RX ORDER — OLANZAPINE 10 MG/1
10 TABLET ORAL 3 TIMES DAILY PRN
Status: DISCONTINUED | OUTPATIENT
Start: 2021-06-29 | End: 2021-07-23 | Stop reason: HOSPADM

## 2021-06-29 RX ORDER — TRAZODONE HYDROCHLORIDE 50 MG/1
50 TABLET, FILM COATED ORAL
Status: DISCONTINUED | OUTPATIENT
Start: 2021-06-29 | End: 2021-07-23 | Stop reason: HOSPADM

## 2021-06-29 RX ORDER — AMOXICILLIN 250 MG
1 CAPSULE ORAL 2 TIMES DAILY PRN
Status: DISCONTINUED | OUTPATIENT
Start: 2021-06-29 | End: 2021-07-23 | Stop reason: HOSPADM

## 2021-06-29 RX ORDER — DIPHENHYDRAMINE HCL 25 MG
25 CAPSULE ORAL ONCE
Status: COMPLETED | OUTPATIENT
Start: 2021-06-29 | End: 2021-06-29

## 2021-06-29 RX ORDER — RISPERIDONE 37.5MG/2ML
37.5 KIT INTRAMUSCULAR
Status: ON HOLD | COMMUNITY
End: 2021-07-23

## 2021-06-29 RX ORDER — PANTOPRAZOLE SODIUM 40 MG/1
40 TABLET, DELAYED RELEASE ORAL ONCE
Status: COMPLETED | OUTPATIENT
Start: 2021-06-29 | End: 2021-06-29

## 2021-06-29 RX ORDER — VITAMIN B COMPLEX
25 TABLET ORAL DAILY
Status: ON HOLD | COMMUNITY
End: 2021-07-23

## 2021-06-29 RX ADMIN — DIPHENHYDRAMINE HYDROCHLORIDE 25 MG: 25 CAPSULE ORAL at 03:40

## 2021-06-29 RX ADMIN — PANTOPRAZOLE SODIUM 40 MG: 40 TABLET, DELAYED RELEASE ORAL at 11:08

## 2021-06-29 ASSESSMENT — ACTIVITIES OF DAILY LIVING (ADL)
HYGIENE/GROOMING: INDEPENDENT
ORAL_HYGIENE: INDEPENDENT
DRESS: STREET CLOTHES
LAUNDRY: WITH SUPERVISION

## 2021-06-29 NOTE — PROGRESS NOTES
06/29/21 1431   General Information   Has Not Attended OT as of: 06/29/21     Plan: OT staff will meet with pt to review the role of occupational therapy and explain the value of having them involved in their treatment plan including options to meet current needs/self-identified goals. As group attendance is established, Pt will be given self-assessment to inform OT initial assessment.
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
## 2021-06-29 NOTE — ED NOTES
"Pt given warm blankets and allowed the writer to check his vitals.  When asked about his current situation the pt stated \"I am faking mental illness but have a disability from muscle atrophy, mt dew, and vodka.  I have a very honey IQ and have been investigating the poison they have been giving me for 20 years and I am waiting for my chance to detox.\"  Pt asked about ETOH use and stated, \"I haven't dran in years.\"  "

## 2021-06-29 NOTE — ED NOTES
"Pt at nurse's station window stating \"my eyes were bigger than my stomach\" and \"tell the ladies I'm a cryptonite and going to sleep for the next 3 hours\".  "
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
## 2021-06-29 NOTE — ED NOTES
"Pt given courtesy meal, pitcher of water. Pt staes \"I'm going to need another and devour this like a motherfucker because I'm a fat burner\". When asked if pt wants another courtesy meal he states \"yeah, I want to overdose on tryptophan\".  "

## 2021-06-29 NOTE — ED NOTES
"He came up to the nursing station window, for a 2nd time.  He was questioning me for the reason of why he was woken up for an antipsychotic.  I told him it was ordered, and some people need this medication to feel better.  He replied, \"Find the doctor that ordered that medication and cut his tamir off.\"  He then told me I am not allowed to wake him up the rest of the night.  He went back in his room.  "

## 2021-06-29 NOTE — ED NOTES
Report received. Assumed care of pt. Pt on Health Officer Hold with security present. Pt wearing scrub pants when in common area but is naked when laying in the bed. Belongings secured in ED locker by previous staff.

## 2021-06-29 NOTE — PHARMACY-ADMISSION MEDICATION HISTORY
Please see Admission Medication History note completed on 6/29/21 under previous encounter at [facility] for information regarding prior to admission medications.    Nicollette McMann, PharmD  General acute hospital: [facility] *[id number]

## 2021-06-29 NOTE — ED NOTES
Dr Nuñez notified that per intake pt will most likely be boarding overnight in ER due to MH staffing issues.

## 2021-06-29 NOTE — ED NOTES
"He walked up to nursing station window and said, \"Are you the one that woke me up out of a dead sleep for zyprexa?  Did I look like I was dying and needed zyprexa?!\" And then walked away, back to his room.  "

## 2021-06-29 NOTE — ED NOTES
"Pt was sleeping naked on stomach on the floor. Pt gets up and put his pants on to approach the window stating \"you guys know if you think you are, then you're hungry. I'm having trouble sleeping, do you have a pillow I can try spooning?\" Pt provided another pillow. Pt returns to the window after using the restroom and states \"sup... I just sat down to pee, now I'm on your team.\" Pt then returns to  room where he promptly removes his pants and lays down on ED cot to rest  "

## 2021-06-29 NOTE — PHARMACY-ADMISSION MEDICATION HISTORY
Pharmacy Medication History  Admission medication history interview status for the 6/28/2021  admission is complete. See EPIC admission navigator for prior to admission medications     Location of Interview: Outside patient room but on unit  Medication history sources: Surescripts and Care Everywhere    Significant changes made to the medication list:  Pt is no longer on Olanzapine, Prolixin was added to Jo meds      In the past week, patient estimated taking medication this percent of the time: 50-90% due to illness    Additional medication history information:       Medication reconciliation completed by provider prior to medication history? No    Time spent in this activity: 15 min    Prior to Admission medications    Medication Sig Last Dose Taking? Auth Provider   multivitamin w/minerals (THERA-VIT-M) tablet Take 1 tablet by mouth daily Past Week at Unknown time Yes Unknown, Entered By History   omeprazole (PRILOSEC) 40 MG DR capsule Take 40 mg by mouth daily Past Week at Unknown time Yes Unknown, Entered By History   polyethylene glycol (MIRALAX) 17 g packet Take 1 packet by mouth daily Past Week at Unknown time Yes Unknown, Entered By History   risperiDONE microspheres ER (RISPERDAL CONSTA) 37.5 MG injection Inject 37.5 mg into the muscle every 14 days 6/17/2021 Yes Unknown, Entered By History   Vitamin D3 (CHOLECALCIFEROL) 25 mcg (1000 units) tablet Take 25 mcg by mouth daily Past Week at Unknown time Yes Unknown, Entered By History       The information provided in this note is only as accurate as the sources available at the time of update(s)

## 2021-06-29 NOTE — PLAN OF CARE
Assessment/Intervention/Current Symtoms and Care Coordination  CTC (writer) reviewed pt's chart. CTC attempted to meet with pt, but pt was to disorganized and not able to meaningfully meet today. Attending reported that pt refused to meet with him today as well. Trx team briefly discussed petition on pt, but determination will be made tomorrow. CTC did inform attending when 72 hour hold would be up (see below). CTC intended to start Initial Psychosocial Assessment (IPA) via chart review but ran out of time. CTC will attempt to meet with pt tomorrow to conduct the IPA and complete it. Initial plan of care team note needs to be done, as pt arrive this afternoon after team had occurred.     Discharge Plan or Goal  TBD    Barriers to Discharge   Safe discharge plan, ongoing symptom severity (Specific Symptoms), and medication evaluation/assessment.    Referral Status  TBD    Legal Status  72 HOUR HOLD: ENDS- 7/2/21 at 8:00am

## 2021-06-29 NOTE — ED NOTES
"Entered  to give patient benadryl with another female nurse.  He approached both of us and took the benadryl.  He stared at us and stated, \"I have to tell you something and it's going to be medical.  I need to have a staring contest with you two because of your beauty.  I wake up in hell, so I need to meditate you beauty and seal it in.\"  Then awkwardly staring at us, said, \"Let me get one more look so I can take it with me so I can get a boner and go to sleep.\"  Security entered  area, and he turned around, and went in room.  "

## 2021-06-29 NOTE — ED PROVIDER NOTES
No issues overnight.  Pt awaiting psych placement.  Pt signed out to Dr. Wong.       Marito Bloom MD  06/29/21 0606

## 2021-06-29 NOTE — PROGRESS NOTES
..               IN PATIENT ROOM:   Pt has glasses and socks in his room.    IN PATIENT LOCKER:   Pt has sweat pants with strings and a shirt in the locker. Pt does NOT have shoes, cell phone, or a wallet at this time.     IN SECURITY:   Pt has nothing in security at this time.    ADMISSION:  I am responsible for any personal items that are not sent to the safe or pharmacy. [facility] is not responsible for loss, theft or damage of any property in my possession.    Patient Signature _____________________ Date/Time _____________________    Staff Signature _______________________ Date/Time _____________________  2nd Staff person, if patient is unable/unwilling to sign  ___________________________________ Date/Time _____________________  DISCHARGE:  All personal items have been returned to me.    Patient Signature _____________________ Date/Time _____________________    Staff Signature _______________________ Date/Time _____________________
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
## 2021-06-30 LAB
ALBUMIN SERPL-MCNC: 4.3 G/DL (ref 3.4–5)
ALP SERPL-CCNC: 113 U/L (ref 40–150)
ALT SERPL W P-5'-P-CCNC: 24 U/L (ref 0–70)
ANION GAP SERPL CALCULATED.3IONS-SCNC: 5 MMOL/L (ref 3–14)
AST SERPL W P-5'-P-CCNC: 34 U/L (ref 0–45)
BASOPHILS # BLD AUTO: 0.1 10E9/L (ref 0–0.2)
BASOPHILS NFR BLD AUTO: 0.5 %
BILIRUB SERPL-MCNC: 0.5 MG/DL (ref 0.2–1.3)
BUN SERPL-MCNC: 11 MG/DL (ref 7–30)
CALCIUM SERPL-MCNC: 9.3 MG/DL (ref 8.5–10.1)
CHLORIDE SERPL-SCNC: 107 MMOL/L (ref 94–109)
CO2 SERPL-SCNC: 25 MMOL/L (ref 20–32)
CREAT SERPL-MCNC: 1.04 MG/DL (ref 0.66–1.25)
DIFFERENTIAL METHOD BLD: ABNORMAL
EOSINOPHIL # BLD AUTO: 0 10E9/L (ref 0–0.7)
EOSINOPHIL NFR BLD AUTO: 0.2 %
ERYTHROCYTE [DISTWIDTH] IN BLOOD BY AUTOMATED COUNT: 12.5 % (ref 10–15)
GFR SERPL CREATININE-BSD FRML MDRD: >90 ML/MIN/{1.73_M2}
GLUCOSE SERPL-MCNC: 101 MG/DL (ref 70–99)
HCT VFR BLD AUTO: 41.5 % (ref 40–53)
HGB BLD-MCNC: 14.1 G/DL (ref 13.3–17.7)
IMM GRANULOCYTES # BLD: 0 10E9/L (ref 0–0.4)
IMM GRANULOCYTES NFR BLD: 0.2 %
LYMPHOCYTES # BLD AUTO: 2.2 10E9/L (ref 0.8–5.3)
LYMPHOCYTES NFR BLD AUTO: 18.7 %
MCH RBC QN AUTO: 30.1 PG (ref 26.5–33)
MCHC RBC AUTO-ENTMCNC: 34 G/DL (ref 31.5–36.5)
MCV RBC AUTO: 89 FL (ref 78–100)
MONOCYTES # BLD AUTO: 0.9 10E9/L (ref 0–1.3)
MONOCYTES NFR BLD AUTO: 7.5 %
NEUTROPHILS # BLD AUTO: 8.7 10E9/L (ref 1.6–8.3)
NEUTROPHILS NFR BLD AUTO: 72.9 %
NRBC # BLD AUTO: 0 10*3/UL
NRBC BLD AUTO-RTO: 0 /100
PLATELET # BLD AUTO: 396 10E9/L (ref 150–450)
POTASSIUM SERPL-SCNC: 4.5 MMOL/L (ref 3.4–5.3)
PROT SERPL-MCNC: 8 G/DL (ref 6.8–8.8)
RBC # BLD AUTO: 4.68 10E12/L (ref 4.4–5.9)
SODIUM SERPL-SCNC: 137 MMOL/L (ref 133–144)
TSH SERPL DL<=0.005 MIU/L-ACNC: 0.74 MU/L (ref 0.4–4)
WBC # BLD AUTO: 12 10E9/L (ref 4–11)

## 2021-06-30 PROCEDURE — 99233 SBSQ HOSP IP/OBS HIGH 50: CPT | Performed by: PSYCHIATRY & NEUROLOGY

## 2021-06-30 PROCEDURE — 85025 COMPLETE CBC W/AUTO DIFF WBC: CPT | Performed by: PSYCHIATRY & NEUROLOGY

## 2021-06-30 PROCEDURE — 124N000002 HC R&B MH UMMC

## 2021-06-30 PROCEDURE — 84443 ASSAY THYROID STIM HORMONE: CPT | Performed by: PSYCHIATRY & NEUROLOGY

## 2021-06-30 PROCEDURE — 80053 COMPREHEN METABOLIC PANEL: CPT | Performed by: PSYCHIATRY & NEUROLOGY

## 2021-06-30 PROCEDURE — 250N000013 HC RX MED GY IP 250 OP 250 PS 637: Performed by: PSYCHIATRY & NEUROLOGY

## 2021-06-30 PROCEDURE — H2032 ACTIVITY THERAPY, PER 15 MIN: HCPCS

## 2021-06-30 PROCEDURE — 36415 COLL VENOUS BLD VENIPUNCTURE: CPT | Performed by: PSYCHIATRY & NEUROLOGY

## 2021-06-30 RX ORDER — OLANZAPINE 10 MG/2ML
5 INJECTION, POWDER, FOR SOLUTION INTRAMUSCULAR 2 TIMES DAILY
Status: DISCONTINUED | OUTPATIENT
Start: 2021-06-30 | End: 2021-07-02

## 2021-06-30 RX ORDER — DIPHENHYDRAMINE HCL 25 MG
50 CAPSULE ORAL EVERY 6 HOURS PRN
Status: DISCONTINUED | OUTPATIENT
Start: 2021-06-30 | End: 2021-07-23 | Stop reason: HOSPADM

## 2021-06-30 RX ADMIN — DIPHENHYDRAMINE HYDROCHLORIDE 50 MG: 25 CAPSULE ORAL at 21:38

## 2021-06-30 RX ADMIN — RISPERIDONE 1.5 MG: 1 TABLET ORAL at 21:38

## 2021-06-30 RX ADMIN — RISPERIDONE 1.5 MG: 1 TABLET ORAL at 12:47

## 2021-06-30 RX ADMIN — DIPHENHYDRAMINE HYDROCHLORIDE 50 MG: 25 CAPSULE ORAL at 12:47

## 2021-06-30 ASSESSMENT — ACTIVITIES OF DAILY LIVING (ADL)
DRESS: INDEPENDENT
ORAL_HYGIENE: INDEPENDENT
HYGIENE/GROOMING: INDEPENDENT
ORAL_HYGIENE: INDEPENDENT
HYGIENE/GROOMING: SHOWER;INDEPENDENT
LAUNDRY: WITH SUPERVISION
DRESS: INDEPENDENT

## 2021-06-30 NOTE — PLAN OF CARE
"Pt is manic pacing hallway throughout shift. Pt attended community meeting and was not disruptive. Thought process is tangential and rambles. Pt had frequent requests from writer. Pt is paranoid stating that he has covid. Pt also was requesting a 1:1 to watch him sleep in case he needs \"CPR but I have broken ribs so they shouldn't press too hard but they will have to push hard\". Pt is committed and has a Jo. Pt initially refused Risperdal but when explained the alternative is Zyprexa IM pt stated \"I'll take the water pills I'm already used to them thanks for being flexible\". Pt is unable to rate anxiety and depression. Pt rates pain at 0/10. Pt reports no SI/HI and contracts for safety. Pt was visible on unit pacing the halls. Pt required frequent re direction during shift. Continue current POC.    "

## 2021-06-30 NOTE — PLAN OF CARE
Problem: General Plan of Care (Inpatient Behavioral)  Goal: Team Discussion  Description: Team Plan:  Outcome: No Change  Note: BEHAVIORAL TEAM DISCUSSION    Participants: Doctor Sneha BEARD; Sam St RN; Evette KAMARA  Progress: initial assessment  Anticipated length of stay: 5-7 days  Continued Stay Criteria/Rationale: Committed with Jo through [place]. Admitted due to increased manic psychotic symptoms. Minimal sleep, disorganized, tangential, paranoid. Refusing meds and labs.   Medical/Physical: no acute issues.   Precautions:   Behavioral Orders   Procedures     Code 1 - Restrict to Unit     Routine Programming     As clinically indicated     Self Injury Precaution     Sexual precautions     Status 15     Every 15 minutes.     Plan: Psychiatric Assessment. Medication Management. Therapeutic Mileu. Individual and group support.  Rationale for change in precautions or plan: no change.

## 2021-06-30 NOTE — PROGRESS NOTES
Christy Weinberg call and confirmed last dose of risperidone consta 37.5mg IM was given on 6/17/21 at 1100.

## 2021-06-30 NOTE — PLAN OF CARE
[facility] Daily Note:       Work Completed: Participated in team meeting. Completed chart review. Called [facility] [place] [facility] and confirmed that pt's commitment and Jo were extended through June 28th, 2022.  Order was placed in pt's chart.  Spoke to pt's CCM [facility] [place] [facility] : Yeny Robison [phone] via phone. Has been working with pt since 2019. Reports that pt denies he has mental illness and feels that his medications are poisoning him.  She reports that pt will agree with whatever he needs to to get out of hospital but then not follow through.  For example, he has agreed to work with ACT team in the past but then as soon as he got out he declined. Has been working with Dr. Crooks for several years. Pt reports that he is working with outpatient psychiatrist to get off his injectable. Has been in and out of [facility] many times with multiple commitments.     Discharge plan or goal: Unclear at this time.                 Barriers to discharge: Stabilization of mental health symptoms.  Pt is committed with Jo through [facility] [place] [facility].
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
## 2021-06-30 NOTE — PLAN OF CARE
"Patient has been hyperverbal and appears manic with a full range affect. He met with RN at about 1600 and talked about how he didn't like the doctor and would prefer a female doctor. He also called staff into his room stating most pill are made out of sugar and water. He also talked to RN about smoking weed together. He also told staff during that check in period that he has been \"up for 30hr in a day then sleep for 10.\" Patient has not been intrusive but has questionable boundaries sometimes. He ate dinner but only half of his tray. He would usually pace the halls and stop at the desk to share his thoughts which are usually incoherent. Patient denies all mental health symptoms and has refused all medications suggested. Vitals are WNL.   "
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
## 2021-07-01 PROCEDURE — 250N000013 HC RX MED GY IP 250 OP 250 PS 637: Performed by: PSYCHIATRY & NEUROLOGY

## 2021-07-01 PROCEDURE — 124N000002 HC R&B MH UMMC

## 2021-07-01 PROCEDURE — 99233 SBSQ HOSP IP/OBS HIGH 50: CPT | Performed by: PSYCHIATRY & NEUROLOGY

## 2021-07-01 PROCEDURE — 250N000011 HC RX IP 250 OP 636: Performed by: PSYCHIATRY & NEUROLOGY

## 2021-07-01 RX ORDER — RISPERIDONE 37.5MG/2ML
37.5 KIT INTRAMUSCULAR
Status: DISCONTINUED | OUTPATIENT
Start: 2021-07-01 | End: 2021-07-12

## 2021-07-01 RX ADMIN — RISPERIDONE 37.5 MG: KIT at 22:03

## 2021-07-01 RX ADMIN — RISPERIDONE 1.5 MG: 1 TABLET ORAL at 08:14

## 2021-07-01 RX ADMIN — DIPHENHYDRAMINE HYDROCHLORIDE 50 MG: 25 CAPSULE ORAL at 21:24

## 2021-07-01 RX ADMIN — OMEPRAZOLE 40 MG: 20 CAPSULE, DELAYED RELEASE ORAL at 08:14

## 2021-07-01 RX ADMIN — RISPERIDONE 1.5 MG: 1 TABLET ORAL at 21:22

## 2021-07-01 ASSESSMENT — ACTIVITIES OF DAILY LIVING (ADL)
LAUNDRY: WITH SUPERVISION
ORAL_HYGIENE: INDEPENDENT
DRESS: INDEPENDENT
HYGIENE/GROOMING: INDEPENDENT
ORAL_HYGIENE: INDEPENDENT
HYGIENE/GROOMING: INDEPENDENT
DRESS: INDEPENDENT

## 2021-07-01 NOTE — PLAN OF CARE
"Patient slept till about 1830. When he got up he said he felt like he had \"slept for 10hrs.\" He was redirected to eat dinner as he kept talking at the desk. Shortly after dinner, he got a visit from his father at about 1910 till about 2000. Father brought in Omeprazole stating this is the only medication that's helped with patient's heartburn. He was told we could order the medication but he insisted that we hold on to it as hey have had issues getting the medication prescribed at other hospitals. Patient had a NEHA filled for his father after father left the unit. Patient paced the hallway through the rest of the shift and he would intermittently request Covid tests or Omeprazole. He required frequent redirections. Patient tried to skip his risperdal but changed his mind after being told the alternative was a shot of zyprexa. Patients vitals are WNL. He denies SI, SIB, HI and did not report any anxiety this shift.   "

## 2021-07-01 NOTE — PLAN OF CARE
Pt is manic pacing hallway throughout shift. Pt did not attend groups today. Thought process is tangential and rambles. Pt is hyper Judaism and speaks frequently about God. Pt has also needed re direction several time for talking and glorifying drug use. Pt is paranoid about meds and some of his peers. Pt requires frequent redirection. Pt was medication compliant and has requested his risperidone shot several times today. Pt informed injection is not ordered yet. Pt is committed and has a Jo. Pt rates anxiety at 0/10 and depression at 0/10. Pt rates pain at 0/10. Pt reports no SI/HI and contracts for safety. Pt was visible on unit pacing the halls. Pt required frequent re direction during shift. Continue current POC.

## 2021-07-01 NOTE — PLAN OF CARE
"Assessment/Intervention/Current Symtoms and Care Coordination  CTC (writer) met with trx team, provided update, and reviewed pt's chart. Pt approached CTC this morning to ask them their name, which was given, and exchanged brief pleasantries. CTC attempted to meet with pt this afternoon to complete IPA, but pt was still to disorganized. Pt did not remember CTC's name from this morning, but agreed that they spoke earlier. Pt expressed feeling that Clozaril is \"evil\" and his IM is \"poison\". Pt believes he is allergic to all neuroleptics so he needs to take benadryl and camomile tea. Pt was hard to track at times, which he acknowledged he was scattered but \"on the mend\". Pt ended the conversation abruptly after stating he said what he wanted to say. Pt was intense, but non-threatening, during the conversation. Pt is clearly still very disorganized. CTC worked in IPA.    Discharge Plan or Goal  TBD     Barriers to Discharge   Safe discharge plan, ongoing symptom severity (manic symptomology, active delusions), and medication evaluation/assessment.     Referral Status  TBD     Legal Status  COMMITMENT with [facility], confirmed 6/30/21 with CM     "

## 2021-07-01 NOTE — PLAN OF CARE
"NOC Shift Report     Pt in black at beginning of shift, pacing and speaking quietly to self and staff that pass by. He presents as manic and is hyperverbal. Content of speech is delusional, tangential, and rambling. Pt states that they \"have hopefully built enough a high enough tolerance for the antipsychotics\". Pt approached the desk asking writer to \"google and then [employer] what a lethal dose of Benadryl is\". Pt makes frequent analogies in speech that actually make some logical sense, e.g. Pt requested a snack, writer informed him that someone needs to be in the black so he would have to wait to which pt responded \"You need a line of sight down the bowling alley so we don't slip on the vinyl, I gotcha\" and continued to refer to the black as the \"bowling alley\" for some time. Pt has been speaking in a stereotypical  accent since approx 0500. He requires frequent redirection and makes frequent requests, sometimes remarking \"I'm just making conversation\". Pt requested an egg crate mattress due to shoulder discomfort w his current mattress. Pt states being worried that the government will follow him with snipers. Pt slept 3.75 hours.      No further pt complaints or concerns at this time.      PRN sleep meds offered, but declined. Pt accepted a lavender patch and tea. Will continue to monitor.    Jorje Schmitz RN    "

## 2021-07-01 NOTE — PROGRESS NOTES
"Pt participated in dance/movement therapy (DMT) with active psychosis, flight of ideas, pressured speech, paranoid perspectives and hyperactive movements.  Pt was unable to join any external rhythms even quick beats, however, appeared to enjoy this therapist meeting him at his pace of movement.  He expressed this as:  \"thank you for meeting me with your heart & music, not your mind and medicine.\"      He shared he has been \"awake since Sunday\" and this day appears to be a turning point as many references were \"since Sunday.\"  He also referenced numerous drugs, alcohol and caffeine-related drinks.  He had some religiosity based in more yogic/meditation/breathing/fasting perspectives that may be contributing factors to explore regarding his condition.  He also mentioned frequent visits to [facility].      He appeared to enjoy DMT and thanked this therapist for the session.         06/30/21 0930   Dance Movement Therapy   Type of Intervention structured groups   Response participates with cues/redirection   Hours 0.5     "

## 2021-07-01 NOTE — PROGRESS NOTES
"[facility], [place]   Psychiatric Progress Note        Interim History:   The patient's care was discussed with the treatment team during the daily team meeting and/or staff's chart notes were reviewed.  Per chart review, Sam slept 3.75 hours last night which was better than the night before. Additionally, they state the Sam requested an eggcrate mattress for sleeping. Sam's father visited last night and Sam signed an NEHA for his father. RN reports Sam continues to appear manic this morning. He initially denied his PO Respirdone this morning but eventually took it. Additionally, Sam is due for Risperidone IM today and receives it Q14 days.     Met with patient: in his bedroom. Sam was naked upon entry to his room but remained fully covered. We asked the patient to dress and revisited once he was fully clothed. Sam continues to be manic talking in a disorganized manner and with pressured speech. At times, he was difficult to redirect. Patient spoke about his grandfather during our conversation this morning. Reports that his grandfather was in the army and that sam was going to join the [employer] just to \"piss him off\". At the same time, he reports that his grandfather passed away and later says he was talking to his grandfather. Unclear if he was having an auditory hallucination. Sam states that he does not have auditory hallucinations and that he is an \"empath\". Sam at one point stated that he wants to \"go home, smoke weed, and jerk off\". Sam also asked us what the lethal dose of chamomile tea is and stated that he will \"over dose\" on tea until he \"passes out to sleep\". We told Sam that he will not be able to overdose on chamomile tea, patient was not responsive to this. Additionally, he started humming with his knees crossed and heads \"like a monk\". Sam asked us if he was \"good at meditating\". Patient asked us if he could have his fit bit watch, " "we explained that we would ask staff but that he likely cannot have it on the unit and to not get his hopes up. Patient was persistent that he wanted his watch and it was unclear if he understood that this was not likely. Additionally, we asked Sam if he has ever taken lithium in the past. Sam stated that lithium is \"poision\" and that it causes dehydration, shrinking the urethra and bladder. We discussed with patient that he will be getting his risperidone consta IM shot, patient thanked us for this. Sam insisted his shot be in the right arm and we told him he can discuss this with his nurse. Later in the day, Sam approached us asking for us to check what his WBC count is. We informed him that we will check his labs and tell him tomorrow if he is interested. Sam thanked us for this and stated he will \"call his pathologist\". Sam had no further questions or concerns.            Medications:       risperiDONE  1.5 mg Oral BID    Or     OLANZapine  5 mg Intramuscular BID     omeprazole  40 mg Oral Daily     risperiDONE microspheres ER  37.5 mg Intramuscular Q14 Days          Allergies:     Allergies   Allergen Reactions     Haloperidol Anaphylaxis          Labs:     No results found for this or any previous visit (from the past 24 hour(s)).       Psychiatric Examination:     /81 (BP Location: Left arm)   Pulse 93   Temp 98.3  F (36.8  C) (Oral)   Resp 16   Wt 76.7 kg (169 lb)   SpO2 96%   BMI 23.57 kg/m    Weight is 169 lbs 0 oz  Body mass index is 23.57 kg/m .  Orthostatic Vitals       Most Recent      Sitting Orthostatic /81 07/01 0803    Sitting Orthostatic Pulse (bpm) 103 07/01 0803    Standing Orthostatic /79 07/01 0803    Standing Orthostatic Pulse (bpm) 125 07/01 0803          Appearance: awake, alert, dressed in hospital scrubs and slightly unkempt. Sitting upright in his bed. Initially, patient was naked but was compliant when asked to get dressed.   Attitude:  somewhat " cooperative  Eye Contact:  good, at times tense  Mood:  elevated  Affect:  mood congruent, intensity is heightened and intensity is dramatic  Speech:  pressured speech and rambling  Psychomotor Behavior:  no evidence of tardive dyskinesia, dystonia, or tics; psychomotor agitation  Throught Process:  disorganized and illogical  Associations:  loosening of associations present  Thought Content:  no evidence of suicidal ideation or homicidal ideation, no auditory hallucinations present and no visual hallucinations present, although, patient talked about having a conversation with his grandfather and talked about calling a pathologist.   Insight:  poor  Judgement:  poor  Oriented to:  time, person, and place  Attention Span and Concentration:  poor  Recent and Remote Memory:  poor    Clinical Global Impressions  First: 7/4 6/30/2021      Most recent:            Precautions:     Behavioral Orders   Procedures     Code 1 - Restrict to Unit     Routine Programming     As clinically indicated     Self Injury Precaution     Sexual precautions     Status 15     Every 15 minutes.          DIagnoses:     1.  Schizoaffective disorder, bipolar type, erum.    2.  Cannabis use disorder is highly likely.         Plan:     Patient is committed and guo. Guo meds are: Prolixin, Risperdal, Zyprexa and Thorazine. Order placed for Risperdal consta 37.5 mg to be given today. Sam last received Risperidol consta 37.5 mg on 06/17. Will continue to provide structure and supprot. If no improvement, will consider increasing Risperdal tomorrow.    SARAVANAN Whitaker-Student     I was present with PA student who participated in the service and in the documentation of the note. I have verified the history and personally performed the physical exam and medical decision making. I agree with the assessment and plan of care as documented in the note.     Santi Hoover MD  [facility]

## 2021-07-02 PROCEDURE — 124N000002 HC R&B MH UMMC

## 2021-07-02 PROCEDURE — 99232 SBSQ HOSP IP/OBS MODERATE 35: CPT | Performed by: PSYCHIATRY & NEUROLOGY

## 2021-07-02 PROCEDURE — 250N000013 HC RX MED GY IP 250 OP 250 PS 637: Performed by: PSYCHIATRY & NEUROLOGY

## 2021-07-02 RX ORDER — OLANZAPINE 10 MG/2ML
5 INJECTION, POWDER, FOR SOLUTION INTRAMUSCULAR AT BEDTIME
Status: DISCONTINUED | OUTPATIENT
Start: 2021-07-02 | End: 2021-07-12

## 2021-07-02 RX ORDER — OLANZAPINE 10 MG/2ML
5 INJECTION, POWDER, FOR SOLUTION INTRAMUSCULAR EVERY MORNING
Status: DISCONTINUED | OUTPATIENT
Start: 2021-07-03 | End: 2021-07-12

## 2021-07-02 RX ADMIN — RISPERIDONE 2.5 MG: 2 TABLET ORAL at 20:31

## 2021-07-02 RX ADMIN — DIPHENHYDRAMINE HYDROCHLORIDE 50 MG: 25 CAPSULE ORAL at 14:20

## 2021-07-02 RX ADMIN — OLANZAPINE 10 MG: 10 TABLET, FILM COATED ORAL at 17:27

## 2021-07-02 RX ADMIN — OMEPRAZOLE 40 MG: 20 CAPSULE, DELAYED RELEASE ORAL at 08:13

## 2021-07-02 RX ADMIN — DIPHENHYDRAMINE HYDROCHLORIDE 50 MG: 25 CAPSULE ORAL at 08:16

## 2021-07-02 RX ADMIN — RISPERIDONE 1.5 MG: 1 TABLET ORAL at 08:13

## 2021-07-02 ASSESSMENT — ACTIVITIES OF DAILY LIVING (ADL)
HYGIENE/GROOMING: INDEPENDENT
ORAL_HYGIENE: INDEPENDENT
LAUNDRY: WITH SUPERVISION
DRESS: SCRUBS (BEHAVIORAL HEALTH)

## 2021-07-02 NOTE — PLAN OF CARE
Assessment/Intervention/Current Symtoms and Care Coordination  CTC (writer) met with trx team, provided update, and reviewed pt's chart. [facility] called CM, Yeny Robison [phone], to introduce themselves and discuss discharge planning. CM reiterated that pt is resistant to community based MH services and is not likely to remain at an IRTS placement if sent. CM provided some additional collateral related to pt's presentation over the past two years, since starting to work with pt, and summarized that not much had changed. Based on their discussion CTC [facility], and that CM discuss with their supervisor the idea of emergency guardianship being pursued since pt has a hx poor insight/judgement related to trx follow-through after being hospitalized. CM agreed that getting pt on the [facility] wait list would be a good idea, and that she would speak with her supervisor about the guardianship idea. [facility] spoke with central pre-admission who reported that  or the committing hospital would need to write a letter to the court requesting that pt be added to the [facility] wait-list. [facility] updated CM about this, and as pt was committed through [facility] she would need to contact them or submit the letter herself. Contact info for the [facility] person central-pre provided was passed onto CM for her to follow-up with. [facility] completed/updated IPA with additional info from CM.     Discharge Plan or Goal  TBD     Barriers to Discharge   Safe discharge plan, ongoing symptom severity (manic symptomology, active delusions), and medication evaluation/assessment.     Referral Status  TBD     Legal Status  COMMITMENT with [facility], confirmed 6/30/21 with CM

## 2021-07-02 NOTE — PLAN OF CARE
"Patient has been pacing in the hallway most of this shift. He is hyperverbal, restless and anxious. He was observed in the [facility] area on and off. At one point he came outside his room with no shirt on and when asked by staff to go back to his room and put on his shirt, he became argumentative and angry but eventually decided to go back to his room. He then came out a few minutes later and told this writer \"you guys are different from [facility]. I was screaming and you guys did not tackle  me, if I was at [facility] and started screaming they could have tackled me immediately.\"  He denies SI/SIB. He denies hallucinations, but patient was observed responding to internal stimuli. Medication compliant. Staff will continue to monitor.  "

## 2021-07-02 NOTE — PROGRESS NOTES
07/02/21 1354   General Information   Date Initially Attended OT 07/02/21     OT Groups Attended: 10 minutes of Clinic (no charge)    Affect/Hygiene/Presentation: Pt presented as energetic and racing. He was able to minimally engage in a hands-on task with increased structure and support from writer. His affect was blunted, no apparent hygiene concerns.     Patient Performance/Response: Pt actively participated in occupational therapy clinic with 3 patients for 10 minutes (no charge). Pt was able to ask for assistance as needed, and minimally engaged in a novel, goal-directed task with assistance for task setup, organization, and sequencing. Pt demonstrated difficulty with focus (less than 5 minutes without interruption), and required assistance to plan and problem solve during task completion. Pt appeared comfortable interacting with peers and writer, and initiated conversation throughout his time in clinic. His speech was pressured, tangential, and difficult to follow.    Plan: More information needed to complete OT Initial Assessment; OT staff will meet with pt to review the role of occupational therapy and explain the value of having them involved in their treatment plan including options to meet current needs/self-identified goals. As group attendance is established, Pt will be given self-assessment to inform OT initial assessment.

## 2021-07-02 NOTE — PLAN OF CARE
"Patient has been hyperactive and hyperverbal through the shift. He usually paces the hallway while stopping every so often at the desk to engage staff. His speech has been incoherent and off tangent for the most part and he sometimes ponders on one thought. When he was told about his risperdal injection, he, through out the shift keot checking in with RN for update despite being informed on a plan to administer it. He still believes he is taking sugar or water pills. Patient did not have any altercation with any patients but a patient came to report to staff that he was \"pissing\" him off after patient had come to him at the phone jennings to talk for about 15minutes. Patient was not observed engaging in any SI or SIB behaviors. He denies any anxiety. He still appears to be responding to internal stimuli. Vitals have been WNL and patient is medication compliant.   "

## 2021-07-02 NOTE — PROGRESS NOTES
"[facility], [place]   Psychiatric Progress Note        Interim History:   The patient's care was discussed with the treatment team during the daily team meeting and/or staff's chart notes were reviewed.  Per staff, Sam continues to be hyperverbal, manic, tangential, and disorganized. Appears to be responding to internal stimuli. He denied suicidal ideation and thoughts of self injurious behavior. Per RN, he slept 5 hours last night and took his medications today. Additionally, Risperdal consta was administered yesterday without problems. Staff reports that Sam would like to discuss his oral Risperdal with us today. Per CTC, Sam has a long standing history of poor medication compliance on an outpatient basis.     Met with patient: in the conference room. Sam continues to have pressured speech and frequently uses profanity and then apologizes throughout our interactions. Sam states that he is doing \"eh okay\" today. States that \"less is more\" in terms of distributing medications. Patient reports that he had his Risperdal consta yesterday and states that he \"did pushups to get over 51%\". Unclear what he was referring to. Additionally, he states that he \"probably won't sleep today because he will be sore from all the pushups\". Sam requests that we discontinue his PO Risperdal. We attempted to tell Sam that we were thinking about adjusting his oral Risperdal to 1.5 mg in the morning to 2.5 mg in the evening but he cut us off. Patient replied saying \" no thank you\" making statements to stop it all together. Additionally, he said that he is \"weirded out\" and that he was \"leaving now\" walking out of the room abruptly.            Medications:       [START ON 7/3/2021] risperiDONE  1.5 mg Oral QAM    Or     [START ON 7/3/2021] OLANZapine  5 mg Intramuscular QAM     risperiDONE  2.5 mg Oral At Bedtime    Or     OLANZapine  5 mg Intramuscular At Bedtime     omeprazole  40 mg Oral " Daily     risperiDONE microspheres ER  37.5 mg Intramuscular Q14 Days          Allergies:     Allergies   Allergen Reactions     Haloperidol Anaphylaxis          Labs:     No results found for this or any previous visit (from the past 24 hour(s)).       Psychiatric Examination:     /88 (BP Location: Left arm)   Pulse 120   Temp 98.3  F (36.8  C) (Oral)   Resp 16   Wt 76.7 kg (169 lb)   SpO2 96%   BMI 23.57 kg/m    Weight is 169 lbs 0 oz  Body mass index is 23.57 kg/m .  Orthostatic Vitals       Most Recent      Sitting Orthostatic /88 07/02 0802    Sitting Orthostatic Pulse (bpm) 120 07/02 0802    Standing Orthostatic /90 07/02 0802    Standing Orthostatic Pulse (bpm) 102 07/02 0802            Appearance: awake, alert, dressed in hospital scrubs and slightly unkempt.   Attitude:  somewhat cooperative  Eye Contact:  good, at times tense  Mood:  elevated, but less  Affect:  mood congruent, intensity is heightened and intensity is dramatic  Speech:  pressured speech  Psychomotor Behavior:  no evidence of tardive dyskinesia, dystonia, or tics; psychomotor agitation but improving  Throught Process:  disorganized and illogical  Associations:  loosening of associations present  Thought Content:  no evidence of suicidal ideation or homicidal ideation, no auditory hallucinations present and no visual hallucinations present, although, staff report that he seems to responding to internal stimuli.   Insight:  poor  Judgement:  poor  Oriented to:  time, person, and place  Attention Span and Concentration:  poor  Recent and Remote Memory:  poor    Clinical Global Impressions  First: 7/4 6/30/2021      Most recent:            Precautions:     Behavioral Orders   Procedures     Code 1 - Restrict to Unit     Routine Programming     As clinically indicated     Self Injury Precaution     Sexual precautions     Status 15     Every 15 minutes.          DIagnoses:     1.  Schizoaffective disorder, bipolar type,  erum.    2.  Cannabis use disorder is highly likely.         Plan:     Patient is committed and jarvised. Jo meds are: Prolixin, Risperdal, Zyprexa and Thorazine. Got Risperdal Consta 37.5 mg yesterday. Order placed to change PO Risperdal to 1.5 mg in the morning and 2.5 mg HS. Will continue to provide structure and support.     SARAVANAN Whitaker-Student     I was present with PA student who participated in the service and in the documentation of the note. I have verified the history and personally performed the physical exam and medical decision making. I agree with the assessment and plan of care as documented in the note.     Santi Hoover MD  [facility]

## 2021-07-02 NOTE — PLAN OF CARE
"NOC Shift Report     Pt in bed at beginning of shift, breathing quiet and unlabored. Pt slept through the majority of shift. Pt woke up around 0500 and entered the milieu w typical manic symptoms, hyperactive, hyperverbal w inappropriate and intrusive behavior at the desk e.g. visibly pretending to bowl down the hallway as if it were a bowling alley. He appears responding to internal stimuli. Concentration is poor and speech is pressured. Speech is largely delusional, rambling, and tangential. Some Hinduism fixation appears present, heard referring to a \"Great One that I can't reach, but I know he's always here\" and can be seen praying. Pt appeared to be breathing heavily and stated he was exercising to hit his max heart rate, tea was offered and accepted. Pt stated he couldn't breathe and was taking deep anxious appearing breaths but reported he was okay and declined all PRNs. He has been heard making inconsistent statements e.g. \"I still have so much energy\" and \"God, I'm so tired\" two minutes apart. Speech became more delusional and rambling as the shift went on, \"It's heads or tails, you either lose your head or share your tail, just thought I'd warn you guys because I got my answer\". Word Mayank again briefly noted this shift. Pt made several statements about their grandfather e.g. \"My grandfather  of Alzheimer's, I was worried he..[unintelligible] so I tried to kill myself but I promised I wouldn't first\", pt speaks as if grandfather is still alive at times and expressed guilt over not visiting his grave more. A large portion of his speech to himself this writer overheard was focused on pt's relationship w his grandfather which sounds as if it were strained. Pt slept 5 hours.      No further pt complaints or concerns at this time.      No PRNs given. Will continue to monitor.    Jorje Schmitz RN  "

## 2021-07-03 PROCEDURE — 124N000002 HC R&B MH UMMC

## 2021-07-03 PROCEDURE — 250N000013 HC RX MED GY IP 250 OP 250 PS 637: Performed by: PSYCHIATRY & NEUROLOGY

## 2021-07-03 RX ADMIN — RISPERIDONE 1.5 MG: 1 TABLET ORAL at 08:42

## 2021-07-03 RX ADMIN — OMEPRAZOLE 40 MG: 20 CAPSULE, DELAYED RELEASE ORAL at 08:42

## 2021-07-03 RX ADMIN — RISPERIDONE 2.5 MG: 2 TABLET ORAL at 21:43

## 2021-07-03 RX ADMIN — OLANZAPINE 10 MG: 10 TABLET, FILM COATED ORAL at 11:04

## 2021-07-03 RX ADMIN — DIPHENHYDRAMINE HYDROCHLORIDE 50 MG: 25 CAPSULE ORAL at 08:42

## 2021-07-03 ASSESSMENT — ACTIVITIES OF DAILY LIVING (ADL)
ORAL_HYGIENE: INDEPENDENT
DRESS: INDEPENDENT;SCRUBS (BEHAVIORAL HEALTH)
ORAL_HYGIENE: INDEPENDENT
HYGIENE/GROOMING: SHOWER;INDEPENDENT
HYGIENE/GROOMING: INDEPENDENT
DRESS: INDEPENDENT

## 2021-07-03 NOTE — PLAN OF CARE
"Patient has been restless and hyperverbal this shift. He was increasingly agitated at the start of the shift. He touched staff and also cursed at staff. He was intrusive to some patients and required frequent redirections. He was giving PRN zyprexa 10mg PO which at first, he was refusing and went on his knees at the desk begging RN and staff saying \"it caused him erectile dysfunction.\" He eventually took it and remained agitated till about after dinner when he went to his room to lay down. After receiving his night time medication, he came out of his room and paced for about about 1hr occasionally whispering things into the medication window or to the PAs on the floor. Patient is eating adequately. He is medication compliant. He denies any SI, SIB. He still appears to be responding to internal stimuli. Vitals are WNL.   "

## 2021-07-03 NOTE — PLAN OF CARE
"NOC Shift Report     Pt in bed at beginning of shift, breathing quiet and unlabored. Pt slept through about half of shift. He left his room at 0400 into the milieu, he presented as manic, he was hyperverbal with most speech being muttered at an almost inaudible volume. Speech is still largely delusional and tangential. Pt admitted to olfactory hallucinations, stating distress that his \"pee just smelled like burning fat and mountain dew\" although writer suspects this is likely more delusion than hallucination. Pt requested writer come with to confirm this report in the pt's bathroom multiple times, stating \"you know you're going to, every fear hides a wish\". Pt reports being \"so wasted off weed, I smoked too much\" upon waking up, requires frequent redirection which is effective. Pt requested a snack which was provided. He reports that \"this is a very easy place to feel supported\". Pt is intrusive and disruptive to other pts on the unit. Pt also requested one last time before shift change for writer to follow him to his room, writer declined. Pt stated \"fine, I'll just have to trick a female staff into doing it\". Pt is already on sexual precautions. Pt slept 5 hours.      No pt complaints or concerns at this time.      No PRNs given. Will continue to monitor.    Jorje Schmitz RN    "

## 2021-07-03 NOTE — PROGRESS NOTES
"Number of patients attending the group:  2  Group Length:  0.5 Hour    Group Therapy     Summary of Group / Topics Discussed:  The  Psychotherapy group goal is to promote insight to positive choice and change. Group processing is within a supportive and safe environment. Patients will process emotions using verbal group and expressive psychotherapy interventions.    Assessment : Pt attended group. He presented with a bright affect and a happy mood. He asked what the group was about and writer explained the intent of the group to the patient. Pt reported being \"very happy to see such a talent\" as displayed by another group member.    Patient Response: Pt reported he had no concern or anything to work on. Appeared to be manic as he was very quick with his speech, and was loud and overtly expressive. He left the room immediately after another patient joined the group. Reported \"its a good group\" as he walked out.                    "

## 2021-07-03 NOTE — PLAN OF CARE
Pt is manic pacing hallway throughout shift.  Thought process is tangential and rambles. Pt is paranoid about meds being sugar pills. Pt was given a egg crate mattress per request. Pt requires frequent redirection. Pt received zyprexa at 110 for increase agitation. Pt is committed and has a Jo. Pt rates anxiety at 0/10 and depression at 0/10. Pt rates pain at 0/10. Pt reports no SI/HI and contracts for safety. Pt was visible on unit pacing the halls responding to internal stimuli. Pt took a nap after lunch and was calmer. Continue current POC.

## 2021-07-03 NOTE — PLAN OF CARE
Pt was recorded as asleep for 5hrs during the night shift. He got up once and didn't go back to sleep for about 1hr and patient got up early for the day. He remains on 15min checks and had no medical concerns.

## 2021-07-04 PROCEDURE — 250N000013 HC RX MED GY IP 250 OP 250 PS 637: Performed by: PSYCHIATRY & NEUROLOGY

## 2021-07-04 PROCEDURE — 124N000002 HC R&B MH UMMC

## 2021-07-04 RX ADMIN — RISPERIDONE 2.5 MG: 2 TABLET ORAL at 19:41

## 2021-07-04 RX ADMIN — OLANZAPINE 10 MG: 10 TABLET, FILM COATED ORAL at 13:38

## 2021-07-04 RX ADMIN — RISPERIDONE 1.5 MG: 1 TABLET ORAL at 07:57

## 2021-07-04 RX ADMIN — DIPHENHYDRAMINE HYDROCHLORIDE 50 MG: 25 CAPSULE ORAL at 19:41

## 2021-07-04 RX ADMIN — OMEPRAZOLE 40 MG: 20 CAPSULE, DELAYED RELEASE ORAL at 07:57

## 2021-07-04 ASSESSMENT — ACTIVITIES OF DAILY LIVING (ADL)
DRESS: INDEPENDENT
DRESS: INDEPENDENT
ORAL_HYGIENE: INDEPENDENT
HYGIENE/GROOMING: INDEPENDENT
HYGIENE/GROOMING: INDEPENDENT
LAUNDRY: WITH SUPERVISION
ORAL_HYGIENE: INDEPENDENT

## 2021-07-04 NOTE — PROGRESS NOTES
NOC Shift Report     Patient awake on unit at start of shift. He frequently paced the black, responded to internal stimuli, and made odd statements to staff or himself. Pt slept 4.5 hours. No agitation present. No PRN's given.     Will continue to monitor.

## 2021-07-04 NOTE — PLAN OF CARE
"Pt is manic pacing hallway throughout shift. Thought process is tangential and rambles. Pt had requests then would just walk away prior to request completed. Pt is paranoid about meds being sugar pills and he needs to \"balance out the doses so I don't crash\"\".  Pt is committed and has a Jo. Pt rates anxiety at 0/10 and depression at 0/10. Pt rates pain at 0/10. Pt reports no SI/HI and contracts for safety. Pt was visible on unit pacing the halls responding to internal stimuli. Pt was visible in the milieu but not engaged with peers. Continue current POC.  "

## 2021-07-04 NOTE — PLAN OF CARE
"  Problem: Behavioral Health Plan of Care  Goal: Plan of Care Review  Outcome: No Change    Pt was in and out of his room. Was restless. Appeared to be responding to internal stimuli but denied it. Was tangential, rambling and hyperverbal. Stated \" The meds are nothing but water, sugar and alcohol. They don't work.\"  NO medication side effects reported or noted. Eating and sleeping well.   Plan: Status 15s; Build trust with pt. Continue to build on strengths. Encourage compliance and healthy coping.          "

## 2021-07-05 PROCEDURE — 99232 SBSQ HOSP IP/OBS MODERATE 35: CPT | Performed by: PSYCHIATRY & NEUROLOGY

## 2021-07-05 PROCEDURE — 124N000002 HC R&B MH UMMC

## 2021-07-05 PROCEDURE — G0177 OPPS/PHP; TRAIN & EDUC SERV: HCPCS

## 2021-07-05 PROCEDURE — 250N000013 HC RX MED GY IP 250 OP 250 PS 637: Performed by: PSYCHIATRY & NEUROLOGY

## 2021-07-05 RX ADMIN — OMEPRAZOLE 40 MG: 20 CAPSULE, DELAYED RELEASE ORAL at 08:38

## 2021-07-05 RX ADMIN — DIPHENHYDRAMINE HYDROCHLORIDE 50 MG: 25 CAPSULE ORAL at 17:57

## 2021-07-05 RX ADMIN — RISPERIDONE 1.5 MG: 1 TABLET ORAL at 08:39

## 2021-07-05 RX ADMIN — RISPERIDONE 2.5 MG: 2 TABLET ORAL at 20:16

## 2021-07-05 NOTE — PROGRESS NOTES
"[facility], [place]   Psychiatric Progress Note        Interim History:   The patient's care was discussed with the treatment team during the daily team meeting and/or staff's chart notes were reviewed.  Per staff, Sam continues to be hyperverbal, manic, tangential, and disorganized. He is restless and constantly on a move. He is focused on his meds, calls Risperdal \"a sugar pill\".    Met with patient: in the conference room. Sam continues to have pressured speech and tends to ramble and deviate from the topic of conversation. He has very poor boundaries and constantly asks this provider about how many children I have, how many languages I speak, gender of my children etc. He calls himself \"an empath\", states that he can figure out some of my and other people personal information based on our responses. He needs to be constantly reminded to focus on the topic of conversation and talk about self and not others. He is focused on getting off his meds, constantly talks about not needing to be on oral Risperdal after he was given Consta shot. We had to remind him that he was/is committed and court ordered to take medications. By the end of interview he still appeared to be not fully understand why he takes meds.           Medications:       risperiDONE  1.5 mg Oral QAM    Or     OLANZapine  5 mg Intramuscular QAM     risperiDONE  2.5 mg Oral At Bedtime    Or     OLANZapine  5 mg Intramuscular At Bedtime     omeprazole  40 mg Oral Daily     risperiDONE microspheres ER  37.5 mg Intramuscular Q14 Days          Allergies:     Allergies   Allergen Reactions     Haloperidol Anaphylaxis          Labs:     No results found for this or any previous visit (from the past 24 hour(s)).       Psychiatric Examination:     /78 (BP Location: Right arm)   Pulse 103   Temp 97.8  F (36.6  C) (Oral)   Resp 16   Wt 75.8 kg (167 lb)   SpO2 98%   BMI 23.29 kg/m    Weight is 167 lbs 0 oz  Body mass " index is 23.29 kg/m .    Orthostatic Vitals       Most Recent      Sitting Orthostatic /94 07/04 1726    Sitting Orthostatic Pulse (bpm) 89 07/04 1726    Standing Orthostatic /82 07/04 0700    Standing Orthostatic Pulse (bpm) 114 07/04 0700           Appearance: awake, alert, dressed in hospital scrubs and slightly unkempt.   Attitude:  partially cooperative  Eye Contact:  good, at times tense  Mood:  elevated,  Affect:  mood congruent, intensity is heightened and intensity is dramatic  Speech:  pressured speech  Psychomotor Behavior:  no evidence of tardive dyskinesia, dystonia, or tics; psychomotor agitation but improving  Throught Process:  disorganized and illogical  Associations:  loosening of associations present  Thought Content:  no evidence of suicidal ideation or homicidal ideation, no auditory hallucinations present and no visual hallucinations present, although, staff report that he seems to responding to internal stimuli.   Insight:  poor  Judgement:  poor  Oriented to:  time, person, and place  Attention Span and Concentration:  poor  Recent and Remote Memory:  poor    Clinical Global Impressions  First: 7/4 6/30/2021      Most recent:            Precautions:     Behavioral Orders   Procedures     Code 1 - Restrict to Unit     Routine Programming     As clinically indicated     Self Injury Precaution     Sexual precautions     Status 15     Every 15 minutes.          DIagnoses:     1.  Schizoaffective disorder, bipolar type, erum.    2.  Cannabis use disorder is highly likely.         Plan:     Patient is committed and jarvised. Jo meds are: Prolixin, Risperdal, Zyprexa and Thorazine. Got Risperdal Consta 37.5 mg. Will continue PO Risperdal 1.5 mg in the morning and 2.5 mg HS. May need to consider putting patient on Zyprexa in addition to Risperdal if he shows little response. Will continue to provide structure and support.     Santi Hoover MD  [facility]  Psychiatry

## 2021-07-05 NOTE — PLAN OF CARE
Assessment/Intervention/Current Symtoms and Care Coordination  CTC (writer) met with trx team, provided update, and reviewed pt's chart. Trx team is waiting on feedback from CM to collaboratively develop a discharge plan for community re-entry. CTC did attempt to check in with pt today, during which pt was less scattered, but otherwise continues to present as manic and delusional. At this time, due to pt hx of poor follow-through CTC has no recommendations that they think would be a good option. Furthermore, pt continues to present as manic, which makes placement challenging as pt is not stable enough to provide constructive feedback to trx team. Due to the holiday pt's CM is not available today, so CTC will follow-up with her tomorrow to discuss discharge planning further. Pt and CTC interacted again this afternoon, during which pt indicated that they didn't remember that CTC was their in-hospital . Pt did ask CTC for their regular CM contact info, which was provided to pt and CTC encouraged them to call their CM this week. CTC did warn them that due to the holiday their CM wasn't available today, which pt reported they were just going to leave a  anyway.     Discharge Plan or Goal  TBD     Barriers to Discharge   Safe discharge plan, ongoing symptom severity (manic symptomology, active delusions), and medication evaluation/assessment.     Referral Status  TBD     Legal Status  COMMITMENT with [facility], confirmed 6/30/21 with CM

## 2021-07-05 NOTE — PLAN OF CARE
Patient appeared asleep for 5hrs this shift. He woke up at about 0400 requesting snacks then paced the hallway for a while before going back to bed.

## 2021-07-05 NOTE — PLAN OF CARE
Patient still manic, hyperverbal, tangential and rambles. He has been pacing in the hallway most of this shift with headphones on. He denies SI/SIB/AVH/HI. He denies depression and anxiety. Medication compliant. He called the oral Risperdal a 'sugar pill' and would like to stay here for another two weeks  to receive IM injection before he can leave.   Patient reports that his sleep has improved. He denies pain. Reports good appetite.

## 2021-07-05 NOTE — PLAN OF CARE
Patient has been manic for most of the shift. He is frequently checking in with staff and making requests or suggestions to staff. He has also been observed engaging other patients with no concerns this shift. Patient denies having any SI, SIB, HI or hallucinations. He reports no anxiety and states is mood is good. He is eating adequately and reports no pain this shift. Vitals are WNL.

## 2021-07-05 NOTE — PLAN OF CARE
"  OT Groups Attended: Clinic, Mental Health Management     Affect/Hygiene/Presentation: Generally calm/pleasant, engaged with occasional assistance to refocus his attention, blunted affect. No apparent hygiene concerns.     Patient Performance/Response:  -Clinic: Pt actively participated in occupational therapy clinic with 3 patients for 60 minutes. Pt was able to ask for assistance as needed, and independently engage in a novel, goal-directed task with assistance for task selection, setup, organization, and sequencing. Pt demonstrated good focus (20-30 minutes prior to taking a break) with structure and encouragement provided. He required occasional assistance to plan and problem solve during task completion. Pt appeared comfortable interacting with peers and writer, however he initially required encouragement from writer to focus his attention on his selected task and allow space for peers to do the same.   -Mental Health Management: Pt actively participated in a structured occupational therapy group of 3 patients for 60 minutes with a focus on coping through movement via chair yoga as a strategy to facilitate therapeutic exercise, calming, and stress management. Pt actively followed 90% of the movements, and remained attentive and engaged throughout group. Pt verbalized feeling \"limber, and I need a nap\" at the end of group. Pt contributed at least one idea to a discussion at the end of group regarding the benefits of exercise, stretching, and deep breathing.    Plan: More information needed to complete OT Initial Assessment; OT staff will meet with pt to review the role of occupational therapy and explain the value of having them involved in their treatment plan including options to meet current needs/self-identified goals. As group attendance is established, Pt will be given self-assessment to inform OT initial assessment.     "

## 2021-07-06 PROCEDURE — 250N000013 HC RX MED GY IP 250 OP 250 PS 637: Performed by: PSYCHIATRY & NEUROLOGY

## 2021-07-06 PROCEDURE — 99232 SBSQ HOSP IP/OBS MODERATE 35: CPT | Performed by: PSYCHIATRY & NEUROLOGY

## 2021-07-06 PROCEDURE — 124N000002 HC R&B MH UMMC

## 2021-07-06 RX ORDER — OLANZAPINE 5 MG/1
5 TABLET ORAL 2 TIMES DAILY
Status: DISCONTINUED | OUTPATIENT
Start: 2021-07-06 | End: 2021-07-23 | Stop reason: HOSPADM

## 2021-07-06 RX ORDER — OLANZAPINE 10 MG/2ML
5 INJECTION, POWDER, FOR SOLUTION INTRAMUSCULAR 2 TIMES DAILY
Status: DISCONTINUED | OUTPATIENT
Start: 2021-07-06 | End: 2021-07-23 | Stop reason: HOSPADM

## 2021-07-06 RX ADMIN — OMEPRAZOLE 40 MG: 20 CAPSULE, DELAYED RELEASE ORAL at 08:17

## 2021-07-06 RX ADMIN — RISPERIDONE 1.5 MG: 1 TABLET ORAL at 08:17

## 2021-07-06 RX ADMIN — RISPERIDONE 2.5 MG: 2 TABLET ORAL at 19:41

## 2021-07-06 RX ADMIN — OLANZAPINE 5 MG: 5 TABLET, FILM COATED ORAL at 19:42

## 2021-07-06 RX ADMIN — DIPHENHYDRAMINE HYDROCHLORIDE 50 MG: 25 CAPSULE ORAL at 18:09

## 2021-07-06 RX ADMIN — DIPHENHYDRAMINE HYDROCHLORIDE 50 MG: 25 CAPSULE ORAL at 10:14

## 2021-07-06 NOTE — PLAN OF CARE
Patient is restless, pacing in the hallway on and off with headphones on. He Still manic, tangential and rambles from subject to subject. He is compliant with taking his medications.Not attending groups this shift. He denies all MH symptoms. Eating adequately. Denies pain. Received prn benadryl this shift.

## 2021-07-06 NOTE — PROGRESS NOTES
"[facility], [place]   Psychiatric Progress Note        Interim History:   The patient's care was discussed with the treatment team during the daily team meeting and/or staff's chart notes were reviewed.  Per staff, Sam continues to be hyperverbal, making grandiose statements, and manic. He continues to be intrusive and attempted to tell other patients on the unit to not take their medications. He is focused on his meds, calls Risperdal \"a sugar pill\". Patient continues to believe he is allergic to neuroleptics. However, patient has been medication compliant with no signs of cheeking. [facility] states that he will follow up with patients CM regarding AMRCT.     Met with patient: in the his room with the presence of two nursing students. Sam continues to have pressured speech and tends to ramble and deviate from the topic of conversation. He has very poor boundaries and intrusive behavior. We repeatedly asked Sam to not talk about others, including personal questions, but to focus on himself which he had difficulty following. Patient continues to make grandiose statements such as that he is a  in the medical field. Additionally, patient makes statements about the need to walk constantly to \"burn off the sugar\" and that 19 years ago he was tainted from drinking bad water. Says he made a mistake of telling people that he heard to voice of god. Sam repeatedly asked us to decrease his Risperdal which was explained that would not be possible as he is manic. Patient makes comments that he will jay if we increase his Risperdal.           Medications:       risperiDONE  1.5 mg Oral QAM    Or     OLANZapine  5 mg Intramuscular QAM     risperiDONE  2.5 mg Oral At Bedtime    Or     OLANZapine  5 mg Intramuscular At Bedtime     omeprazole  40 mg Oral Daily     risperiDONE microspheres ER  37.5 mg Intramuscular Q14 Days          Allergies:     Allergies   Allergen Reactions     " Haloperidol Anaphylaxis          Labs:     No results found for this or any previous visit (from the past 24 hour(s)).       Psychiatric Examination:     /73 (BP Location: Left arm)   Pulse 120   Temp 97.8  F (36.6  C) (Oral)   Resp 16   Wt 75.7 kg (166 lb 12.8 oz)   SpO2 95%   BMI 23.26 kg/m    Weight is 166 lbs 12.8 oz  Body mass index is 23.26 kg/m .    Orthostatic Vitals       Most Recent      Sitting Orthostatic /73 07/06 0733    Sitting Orthostatic Pulse (bpm) 120 07/06 0733    Standing Orthostatic /80 07/06 0733    Standing Orthostatic Pulse (bpm) 106 07/06 0733          Appearance: awake, alert, dressed in hospital scrubs and slightly unkempt.   Attitude:  partially cooperative  Eye Contact:  good, at times tense  Mood:  elevated  Affect:  mood congruent, intensity is heightened and intensity is dramatic  Speech:  pressured speech, but improving  Psychomotor Behavior:  no evidence of tardive dyskinesia, dystonia, or tics; psychomotor agitation but improving  Throught Process:  disorganized and illogical  Associations:  loosening of associations present  Thought Content:  no evidence of suicidal ideation or homicidal ideation, no auditory hallucinations present and no visual hallucinations present, although, staff report that he seems to responding to internal stimuli.   Insight:  poor  Judgement:  poor  Oriented to:  time, person, and place  Attention Span and Concentration:  poor  Recent and Remote Memory:  poor    Clinical Global Impressions  First: 7/4 6/30/2021      Most recent:            Precautions:     Behavioral Orders   Procedures     Code 1 - Restrict to Unit     Routine Programming     As clinically indicated     Self Injury Precaution     Sexual precautions     Status 15     Every 15 minutes.          DIagnoses:     1.  Schizoaffective disorder, bipolar type, erum.    2.  Cannabis use disorder is highly likely.         Plan:     Patient is committed and jarvised. Jo  meds are: Prolixin, Risperdal, Zyprexa and Thorazine. Got Risperdal Consta 37.5 mg. Will continue PO Risperdal 1.5 mg in the morning and 2.5 mg HS. Will put on Zyprexa in addition to Risperdal as he shows little response. Will continue to provide structure and support.     Martha Forbes PA-Student     I was present with PA student who participated in the service and in the documentation of the note. I have verified the history and personally performed the physical exam and medical decision making. I agree with the assessment and plan of care as documented in the note.     Santi Hoover MD  [facility] Psychiatry

## 2021-07-06 NOTE — PLAN OF CARE
"Assessment/Intervention/Current Symtoms and Care Coordination  CTC (writer) met with trx team, provided update, and reviewed pt's chart. CTC completed weekly care plan team note. CTC met with pt to check-in today. Pt requested that CTC advocate to attending to decrease his medications, due to them increasing his blood sugar and pt is trying to detox. CTC explained that pt would need to have that conversation with the attending themselves, as medication was outside the CTC's scope of practice, but could pass onto the attending that pt had concerns related to his meds. CTC did try reality checking with pt, but pt deflected and changed subject to how he is naturally a weird lesa and will always be weird, denying hearing voices unprompted. Pt the switched back to the topic of medication. Expressing that he refuses to leave until his meds are changed to his liking and that he has \"a big \" waiting on the outside for him that he will jay the doctor with if his meds continue to not be what he thinks he needs. CTC reiterated that they have full confidence in attending's medication knowledge and pt would need to be the person to talk directly to attending about his medication. Pt ended the conversation and thanks CTC for listening and for advocating to the doctor for him about his concerns.    Pt was clearly still very manic, and continues to constantly be moving (ie. pacing the halls), wanting to check in with CTC while pacing. Pt also lacks insight into his situation and the role medication plays in pt's mental health management. CTC suspect that based on some of pt's statements that pt does not see an issue with being manic or the potential problems it could/can cause for him; demonstrating poor insight/judgment.      [facility] reached out to Yeny [phone], today to inquire about any updates related to pt case and provide an update on pt's presentation. However, JAYJAY was did not answer and her VM was full so " no message could be left. CTC will continue to attempt to reach CM throughout the day and week.     Discharge Plan or Goal  TBD     Barriers to Discharge   Safe discharge plan, ongoing symptom severity (manic symptomology, active delusions), and medication evaluation/assessment.     Referral Status  TBD     Legal Status  COMMITMENT with [facility], confirmed 6/30/21 with CM

## 2021-07-06 NOTE — PLAN OF CARE
Patient has been restless through the shift. He his social with peers and also intrusive with some others specifically as related to personal information related to their cares. He had to be redirected once this shift while he was suggestig to another patient to refuse certain medications. Patient also had an outburst at the start of the shift after making 3 calls to family. He checked in with staff and expressed that he was upset about how his family were handling his bills but he later calmed down after multiple check ins with staff. Patient is eating adequately and is medication compliant. He was in bed by 2100. He denies SI, SIB, HI or hallucinations and vitals are WNL.

## 2021-07-06 NOTE — PLAN OF CARE
BEHAVIORAL TEAM DISCUSSION    Participants: Dr. Sneha BEARD, Mildred Armas RN, Stuart Alvarez [facility]  Progress: Slight improvement. Pt is less agitated and is less scattered when talking with staff. However, pt continue to present as highly manic, make delusional/odd statements, and is resistant to his medication recommendations. Pt has been med compliant, but does not appear to have good insight into the importance of his meds or his MH symptoms. Pt is clearly not stable enough for placement and will require additional time to stabilize.  Anticipated length of stay: 7 additional days.  Continued Stay Criteria/Rationale: Ongoing symptoms severity (erum, delusional/disorganized thinking) and medication evaluation/assessment  Medical/Physical: None reported in team  Precautions:   Behavioral Orders   Procedures    Code 1 - Restrict to Unit    Routine Programming     As clinically indicated    Self Injury Precaution    Sexual precautions    Status 15     Every 15 minutes.     Plan: Pt will be provided ongoing medication changes to assist in stabilization efforts. CTC will work with CM to develop a discharge plan, including placement options, as per court required part of commitment.   Rationale for change in precautions or plan: Ongoing stabilization and aftercare support efforts.

## 2021-07-06 NOTE — PLAN OF CARE
Patient appeared asleep for 5hrs during the night shift. Patient woke up to request snacks and pace the hallways then went back to sleep at about 0345. Patient had no behavioral or medical concerns during the shift and remains on 15min checks.

## 2021-07-07 PROCEDURE — H2032 ACTIVITY THERAPY, PER 15 MIN: HCPCS

## 2021-07-07 PROCEDURE — 250N000013 HC RX MED GY IP 250 OP 250 PS 637: Performed by: PSYCHIATRY & NEUROLOGY

## 2021-07-07 PROCEDURE — 99232 SBSQ HOSP IP/OBS MODERATE 35: CPT | Performed by: PSYCHIATRY & NEUROLOGY

## 2021-07-07 PROCEDURE — 124N000002 HC R&B MH UMMC

## 2021-07-07 RX ADMIN — OMEPRAZOLE 40 MG: 20 CAPSULE, DELAYED RELEASE ORAL at 08:36

## 2021-07-07 RX ADMIN — OLANZAPINE 5 MG: 5 TABLET, FILM COATED ORAL at 08:36

## 2021-07-07 RX ADMIN — RISPERIDONE 2.5 MG: 2 TABLET ORAL at 20:36

## 2021-07-07 RX ADMIN — OLANZAPINE 5 MG: 5 TABLET, FILM COATED ORAL at 20:36

## 2021-07-07 RX ADMIN — RISPERIDONE 1.5 MG: 1 TABLET ORAL at 08:36

## 2021-07-07 RX ADMIN — DIPHENHYDRAMINE HYDROCHLORIDE 50 MG: 25 CAPSULE ORAL at 21:42

## 2021-07-07 ASSESSMENT — ACTIVITIES OF DAILY LIVING (ADL)
HYGIENE/GROOMING: INDEPENDENT
DRESS: INDEPENDENT
ORAL_HYGIENE: INDEPENDENT

## 2021-07-07 NOTE — PROGRESS NOTES
"Pt has been observed calling pt relations twice this morning, stating he feels his \"life is in danger\" and that we are \"poisoning the coffee\". After going through his spiel, pt will hang up the phone on pt relations.     Pt also proclaimed, to the desk, at 8:56 AM \"Can someone tell my fucking cunt of a doctor that I don't need a downer at 9AM? Thank you.\"  "

## 2021-07-07 NOTE — PLAN OF CARE
"Assessment/Intervention/Current Symtoms and Care Coordination  CTC (writer) met with trx team, provided update, and reviewed pt's chart. CTC discussed with trx team possible placement options for pt, but it was agreed that pt would most likely abscond from any unlocked placement given pt's pattern of poor follow-through for the past 2 years. At this time trx team believe that discharge pt back to home once stable with outpatient follow-up would be most prudent, once stabilized. CTC will continue to reach out to CM to get potential other options as well, reporting back to trx team when they have an update.    Pt met with CTC this morning, to request they join their meeting with the doctor today. When asked for clarification, pt stated he wanted a \"witness\" to his \"taunting\" the doctor about the meds he was on. Pt expressed being unhappy with the medication regimen, and that he wanted a new doctor. CTC explained that they have nothing to do with assigning providers or changing doctors. Pt left and came back shortly stating: \"If I don't get a new doctor I am going to kill myself; also I would never kill myself\" then walked away again. Later in the morning pt was heard shouting in the hallway, but CTC could not make out what pt stated. RN reported that pt yelled \"help me\" in the hallway and then went to his room without redirection.     CTC did attend the meeting between provider and pt, as per pt's request, in pt's room this afternoon. During the start of which pt kneeled on his bed, made a statement about being killed, and fell face-first onto his pillow dramatically. Pt became unresponsive, beside breathing heavily. When provider tried to engage with pt, he took his shirt off, laid down on his bed and expressed verbally that his meds were to high and pt wanted to detox. Then pt refused to engage with provider further despite additional attempts made by provider to talk with pt; trx team left the room when pt stated " he just wanted to sleep. [facility] did not engage with pt during this meeting.     CTC reached out to CM, Yeny Robison [phone], leaving a VM requesting a callback to discuss pt's case. Furthermore, [facility] provided a brief update on pt's presentation and tentative discharge plan of having pt return home once stable.      Discharge Plan or Goal  Pt will likely be discharged back to home with outpatient follow-up, unless CM requests a different disposition.      Barriers to Discharge   Safe discharge plan, ongoing symptom severity (manic symptomology, active delusions), and medication evaluation/assessment.     Referral Status  TBD     Legal Status  COMMITMENT with [facility], confirmed 6/30/21 with CM

## 2021-07-07 NOTE — PLAN OF CARE
Patient appeared asleep for 6.5hrs during the night shift. Pt. had no medical or behavioral concerns during the night. Patient remains on 15min checks.

## 2021-07-07 NOTE — PLAN OF CARE
Patient is irritable, tense and angry this shift. He is not happy that he was started on Olanzapine medications, he became very agitated, cursing and threatening to jay the doctor. Staff reports that patient called patient relations at least twice this morning. Pt reports that he's allergic to neuroleptics and the doctor is not listening to him. He took the medications as ordered but continued to have verbal outbursts in the hallway. At some point he screamed so loud in the hallway but ended up going to his room.   He is been pacing in the hallway most of this shift with headphones on. He was noted talking to himself, laughing to himself, appears to be responding to internal stimuli. He denies depression and anxiety. Denies SI/SIB. He attended dance therapy group this morning.

## 2021-07-07 NOTE — PLAN OF CARE
Patient has been restless this shift. He paces the hallway and has been hyperverbal sometimes whispering at staff and other patients. He ate dinner and socialized with select patients. He became agitated when he was told he started zyprexa and used some choice words while cursing the psychiatrist. He stated that he would like another psychiatrist. He however took his medications but went on rambling for about 30minutes. He was calmer after checking in with another staff. Patient denies SI, SIB, HI or hallucinations. Vitals are WNL.

## 2021-07-07 NOTE — PROGRESS NOTES
"[facility], [place]   Psychiatric Progress Note        Interim History:     The patient's care was discussed with the treatment team during the daily team meeting and/or staff's chart notes were reviewed.  Per staff, Sam is mad angry about the initiation of scheduled zyprexa. He has been screaming profanities and yelling this morning. [facility] reports that he will touch base with Ry RO about next steps and ideas for discharge.     Met with patient: in the his room with the presence of Stuart ORTIZ, per patients request. Sam was uncooperative. Sam performed a hand routine that resembled a chant while kneeling on his bed and stated \"please stop you're hurting me\" repeatedly a couple of times. Then he dropped his headphones onto the ground, took off his shirt, and flopped onto his abdomen to his bed. Sam then closed his eyes, said he was tired, and started to pant due to \"too much medicine\". Sam proceeded to state that he is here to detox and not die. We asked the patient if he wanted to talk, he responded\"fuck you, thank you very much\". Patient had no further questions or concerns.          Medications:       OLANZapine  5 mg Oral BID    Or     OLANZapine  5 mg Intramuscular BID     risperiDONE  1.5 mg Oral QAM    Or     OLANZapine  5 mg Intramuscular QAM     risperiDONE  2.5 mg Oral At Bedtime    Or     OLANZapine  5 mg Intramuscular At Bedtime     omeprazole  40 mg Oral Daily     risperiDONE microspheres ER  37.5 mg Intramuscular Q14 Days          Allergies:     Allergies   Allergen Reactions     Haloperidol Anaphylaxis          Labs:     No results found for this or any previous visit (from the past 24 hour(s)).       Psychiatric Examination:     /87   Pulse 104   Temp 98.1  F (36.7  C) (Oral)   Resp 16   Wt 75.7 kg (166 lb 12.8 oz)   SpO2 95%   BMI 23.26 kg/m    Weight is 166 lbs 12.8 oz  Body mass index is 23.26 kg/m .    Orthostatic Vitals       Most Recent   "    Sitting Orthostatic /87 07/07 1338    Sitting Orthostatic Pulse (bpm) 104 07/07 1338    Standing Orthostatic /82 07/07 0834    Standing Orthostatic Pulse (bpm) 121 07/07 0834          Appearance: awake, alert, dressed in hospital scrubs and slightly unkempt. Patient removed his scrub top during our interaction.   Attitude:  uncooperative  Eye Contact:  poor  , at times closed his eyes  Mood:  angry  Affect:  mood congruent and intensity is dramatic  Speech:  pressured speech, less  Psychomotor Behavior:  no evidence of tardive dyskinesia, dystonia, or tics; appeared physically agitated  Throught Process:  disorganized and illogical  Associations:  loosening of associations present  Thought Content:  no evidence of suicidal ideation or homicidal ideation, no auditory hallucinations present and no visual hallucinations present, although, staff report that he seems to responding to internal stimuli.   Insight:  poor  Judgement:  poor  Oriented to:  time, person, and place  Attention Span and Concentration:  poor  Recent and Remote Memory:  poor    Clinical Global Impressions  First: 7/4 6/30/2021      Most recent: 6/4 7/7/2021             Precautions:     Behavioral Orders   Procedures     Code 1 - Restrict to Unit     Routine Programming     As clinically indicated     Self Injury Precaution     Sexual precautions     Status 15     Every 15 minutes.          DIagnoses:     1.  Schizoaffective disorder, bipolar type, erum.    2.  Cannabis use disorder is highly likely.         Plan:     Patient is committed and jarvised. Jo meds are: Prolixin, Risperdal, Zyprexa and Thorazine. Got Risperdal Consta 37.5 mg. Will continue PO Risperdal 1.5 mg in the morning and 2.5 mg HS. Continue Zyprexa 5 mg BID. Will continue to provide structure and support.     SARAVANAN Whitaker-Student     I was present with PA student who participated in the service and in the documentation of the note. I have verified the  history and personally performed the physical exam and medical decision making. I agree with the assessment and plan of care as documented in the note.     Santi Hoover MD  [facility] Psychiatry

## 2021-07-08 PROCEDURE — 250N000013 HC RX MED GY IP 250 OP 250 PS 637: Performed by: PSYCHIATRY & NEUROLOGY

## 2021-07-08 PROCEDURE — 124N000002 HC R&B MH UMMC

## 2021-07-08 PROCEDURE — H2032 ACTIVITY THERAPY, PER 15 MIN: HCPCS

## 2021-07-08 PROCEDURE — 99232 SBSQ HOSP IP/OBS MODERATE 35: CPT | Performed by: PSYCHIATRY & NEUROLOGY

## 2021-07-08 RX ADMIN — RISPERIDONE 2.5 MG: 2 TABLET ORAL at 22:14

## 2021-07-08 RX ADMIN — OMEPRAZOLE 40 MG: 20 CAPSULE, DELAYED RELEASE ORAL at 08:36

## 2021-07-08 RX ADMIN — RISPERIDONE 1.5 MG: 1 TABLET ORAL at 08:36

## 2021-07-08 RX ADMIN — OLANZAPINE 5 MG: 5 TABLET, FILM COATED ORAL at 22:14

## 2021-07-08 RX ADMIN — OLANZAPINE 5 MG: 5 TABLET, FILM COATED ORAL at 08:36

## 2021-07-08 ASSESSMENT — ACTIVITIES OF DAILY LIVING (ADL)
HYGIENE/GROOMING: INDEPENDENT
LAUNDRY: UNABLE TO COMPLETE
DRESS: INDEPENDENT
ORAL_HYGIENE: INDEPENDENT
ORAL_HYGIENE: INDEPENDENT
DRESS: INDEPENDENT
HYGIENE/GROOMING: INDEPENDENT

## 2021-07-08 NOTE — PROVIDER NOTIFICATION
07/08/21 0600   Sleep/Rest/Relaxation   Sleep/Rest/Relaxation (WDL) WDL   Sleep/Rest/Relaxation appears asleep   Night Time # Hours 6 hours     Pt had a quiet night with no behavioral or safety concerns. Was observed sleeping during the safety checks. No issue noted or reported. Woke a couple times to exercise and air boxing.

## 2021-07-08 NOTE — PROGRESS NOTES
"[facility], [place]   Psychiatric Progress Note        Interim History:     The patient's care was discussed with the treatment team during the daily team meeting and/or staff's chart notes were reviewed.  Per staff, Sam continues to be angry about starting Zyprexa. Patient denies responding to internal stimuli, however, he frequently appears to be per staff. RN reports patient continues to be paranoid and rude. Sam tried to refuse his medications this morning but eventually took them. Patient is due for updated Covid test- today.     Met with patient: in the lounge. Patient was more calm and somewhat cooperative today. Patient continues to be disorganized and have pressure speech, but has improved. Sam states that he had a scientific finding that chamomile tea + Zyprexa almost stopped his heart. Says that he does not need Zyprexa or Risperidone. Made comments such as \"Zyprexa is so-so but doesn't work for me\" and that \"Risperidone belongs in the trash\". Patient also says that he brown snot have schizoaffective bipolar disorder, but, and that he has \"hypervigilant-ly\" which he states is is like a \"donkey, snap\". Patient states that he fired us and that he would like a new doctor from [facility]. Passively mentioned that he would kill himself but then says that he \"would never\". We told patient that we could ask for a second opinion and patient was receptive to this. Patient made several other requests including a dietician consult for a high protein diet, 1 mg of Ativan, and an egg crate mattress. Discussed with patient that we will not make any changes to his medications but placed a dietitian consult, order for egg crate mattress, and will ask for a second opinion. Patient also asked that we call his doctor, Dr. Keegan Crooks, at [facility].            Medications:       OLANZapine  5 mg Oral BID    Or     OLANZapine  5 mg Intramuscular BID     risperiDONE  1.5 mg Oral QAM    " Or     OLANZapine  5 mg Intramuscular QAM     risperiDONE  2.5 mg Oral At Bedtime    Or     OLANZapine  5 mg Intramuscular At Bedtime     omeprazole  40 mg Oral Daily     risperiDONE microspheres ER  37.5 mg Intramuscular Q14 Days          Allergies:     Allergies   Allergen Reactions     Haloperidol Anaphylaxis          Labs:     No results found for this or any previous visit (from the past 24 hour(s)).       Psychiatric Examination:     BP (!) 141/97 (BP Location: Left arm)   Pulse 137   Temp 96.7  F (35.9  C) (Tympanic)   Resp 16   Wt 75.8 kg (167 lb)   SpO2 98%   BMI 23.29 kg/m    Weight is 167 lbs 0 oz  Body mass index is 23.29 kg/m .    Orthostatic Vitals       Most Recent      Sitting Orthostatic /87 07/07 1338    Sitting Orthostatic Pulse (bpm) 104 07/07 1338    Standing Orthostatic /78 07/08 0839    Standing Orthostatic Pulse (bpm) 112 07/08 0839          Appearance: awake, alert, dressed in hospital scrubs and well groomed.  Attitude:  More cooperative   Eye Contact:  better , at times intense  Mood:  better and less angry  Affect:  mood congruent and intensity is dramatic  Speech:  pressured speech, less  Psychomotor Behavior:  no evidence of tardive dyskinesia, dystonia, or tics; appeared physically agitated  Throught Process:  disorganized and illogical  Associations:  loosening of associations present  Thought Content:  no evidence of homicidal ideation, no auditory hallucinations present and no visual hallucinations present, although, staff report that he seems to responding to internal stimuli. Patient endorses passive suicide ideation, see above.   Insight:  poor  Judgement:  poor  Oriented to:  time, person, and place  Attention Span and Concentration:  poor  Recent and Remote Memory:  poor    Clinical Global Impressions  First: 7/4 6/30/2021      Most recent: 6/4 7/7/2021             Precautions:     Behavioral Orders   Procedures     Code 1 - Restrict to Unit     Routine  Programming     As clinically indicated     Self Injury Precaution     Sexual precautions     Status 15     Every 15 minutes.          DIagnoses:     1.  Schizoaffective disorder, bipolar type, erum.    2.  Cannabis use disorder is highly likely.         Plan:     Patient is committed and jarvised. Jo meds are: Prolixin, Risperdal, Zyprexa and Thorazine. Got Risperdal Consta 37.5 mg. Will continue PO Risperdal 1.5 mg in the morning and 2.5 mg HS. Continue Zyprexa 5 mg BID. No medication changes today. Orders placed for egg crate mattress, dietician. Will continue to provide structure and support.     Will request second opinion, Dr. Sargent.     SARAVANAN Whitaker-Student     I was present with PA student who participated in the service and in the documentation of the note. I have verified the history and personally performed the physical exam and medical decision making. I agree with the assessment and plan of care as documented in the note.     Santi Hoover MD  [facility] Psychiatry

## 2021-07-08 NOTE — PLAN OF CARE
"  Problem: Behavioral Health Plan of Care  Goal: Plan of Care Review  Outcome: Improving    Pt appeared to be responding but denied it. Pt was manic and his speech rapid and pressured. Pt told this writer \" Please come in my office \" referring to his room. When this writer told him that it was his room pt then said \" Oh, I got caught working here for free.\"  Then pt went on a lengthy rant about how he could not stand Zyprexa. Pt stated \" I'm going to have to put zyprexa on my allergy list because Zyprexa cause people to have ED.\" Pt went on saying \" I want my zyprexa discontinued and replaced with 1 mg of Ativan.\"  Pt paced the hallway almost the entire shift. No medication side effects noted. Pt appears to be doing fine taking his scheduled Zyprexa thus far.   Plan: Status 15s; Build trust with pt. Continue to build on strengths. Encourage compliance and healthy coping.          "

## 2021-07-08 NOTE — PLAN OF CARE
"Assessment/Intervention/Current Symtoms and Care Coordination  CTC (writer) met with trx team, provided update, and reviewed pt's chart. Pt spoke with CTC this morning, stating he needed \"a fresh set of eyes\" and that once again he felt that if he didn't get a new doctor he would be more suicidal, but denied intent. CTC explained that based on what pt was saying pt would need to contact patient relations, as CTC was unable to provide the support pt was requesting. Pt stated he called them already and they wouldn't talk with him. Pt was provided patient relations contact info anyway and encouraged to reach out to them as CTC was unable to address his current concerns.    CTC reached out to CM, Yeny Robison [phone], to discuss discharge planning and pt's presentation in the community. CM was agreeable to trx team plan of having pt return home once stabilized. CM and CTC agree that an IRTS placement would be preferable, but also agree that based on pt's hx he will not stay at a placement that is unlocked. CM confirmed talking with pt earlier this week and that pt is still very manic/symptomatic. Signs that pt's symptoms are bad or worsening was an increase or ongoing religiosity or pt reporting that he is talking to his grandfather (who is ). Pt does have fixed delusions related to his medication deteriorating or destroying his body, and that he has a physical disability and not a mental one. Pt can also present as somewhat manic at baseline, but when better is able to \"mask\" symptoms or present as calmer for periods of time.     Discharge Plan or Goal  Pt will likely be discharged back to home with outpatient follow-up, unless CM requests a different disposition.      Barriers to Discharge   Safe discharge plan, ongoing symptom severity (manic symptomology, active delusions), and medication evaluation/assessment.     Referral Status  None, pt has a poor hx of follow-through and CM agrees that any " additional services will likely not be pursued once pt returns to the community.      Legal Status  COMMITMENT with LARON, confirmed 6/30/21 with JAYJAY

## 2021-07-08 NOTE — PLAN OF CARE
Problem: Mood Impairment (Manic or Hypomanic Signs/Symptoms)  Goal: Improved Mood Symptoms (Manic/Hypomanic Signs/Symptoms)  Outcome: Improving  Flowsheets (Taken 7/8/2021 1408)  Mutually Determined Action Steps (Improved Mood Symptoms): engages in physical activity    RN Assessment:  SI/Self harm:  none reported or observed  Aggression/agitation/HI:  pt is pacing quickly up and down the hallways at times  AVH:  pt is clearly responding to internal stimuli, though continues to deny  Sleep: no reported concerns related to sleep  PRN Med: No PRNs administered this shift  Medication AE: none observed  Physical Complaints/Issues: pt reported concern over elevated heart rate, though at the time was pacing fast up and down the black. Writer reminded pt that pacing would increase his heart rate. No other reported concerns.   I & O: eating and drinking well  ADLs: independent  Vitals:  stable and WNL   COVID 19 Assessment: negative    Milieu Participation: minimal, though is visibly pacing around in the milieu. Occasionally impulsive and intrusive with peers. Does accept redirection. Attends some groups.   Behavior: tense, disorganized, paranoid, delusional. Pt continues to report wanting to stop taking zyprexa. Provider made aware of pt's requests.     No other concerns at this time. Nursing will continue to monitor and assess.

## 2021-07-09 PROCEDURE — 250N000013 HC RX MED GY IP 250 OP 250 PS 637: Performed by: PSYCHIATRY & NEUROLOGY

## 2021-07-09 PROCEDURE — 124N000002 HC R&B MH UMMC

## 2021-07-09 PROCEDURE — 99233 SBSQ HOSP IP/OBS HIGH 50: CPT | Performed by: PSYCHIATRY & NEUROLOGY

## 2021-07-09 RX ADMIN — RISPERIDONE 1.5 MG: 1 TABLET ORAL at 08:34

## 2021-07-09 RX ADMIN — OLANZAPINE 5 MG: 5 TABLET, FILM COATED ORAL at 19:15

## 2021-07-09 RX ADMIN — OLANZAPINE 5 MG: 5 TABLET, FILM COATED ORAL at 08:35

## 2021-07-09 RX ADMIN — RISPERIDONE 2.5 MG: 2 TABLET ORAL at 19:14

## 2021-07-09 RX ADMIN — DIPHENHYDRAMINE HYDROCHLORIDE 50 MG: 25 CAPSULE ORAL at 11:36

## 2021-07-09 RX ADMIN — DIPHENHYDRAMINE HYDROCHLORIDE 50 MG: 25 CAPSULE ORAL at 19:14

## 2021-07-09 RX ADMIN — OMEPRAZOLE 40 MG: 20 CAPSULE, DELAYED RELEASE ORAL at 08:35

## 2021-07-09 ASSESSMENT — ACTIVITIES OF DAILY LIVING (ADL)
DRESS: INDEPENDENT
HYGIENE/GROOMING: INDEPENDENT
LAUNDRY: WITH SUPERVISION
ORAL_HYGIENE: INDEPENDENT

## 2021-07-09 NOTE — PLAN OF CARE
"  Problem: Behavioral Health Plan of Care  Goal: Plan of Care Review  Outcome: No Change    Pt is extremely tangential in conversation stating he is here because he had a risperdal taper and drank non-stop mountain so he is up 30 hours at a time and than sleeps.  Pt states than he works out and it releases even more caffeine.  Pt denies any mental health concerns such as SI/SIB/HI or hearing voices.  Pt carries on about a person that is squatting at this dad's house and is a drug dealer and pt buys 10 dollars worth of marijuana, \"with cash under the table.\"  Pt states he fakes mental illness so he can stay home and play video games, go on the computer and eats value menu fast food.  Pt is very hyper and exhibits manic behaviors.  Pt was med compliant and cooperative.  Pt targets staff members to inform them his medication requests.  Writer redirected him to talk to provider multiple times regarding meds.  Pt informs writer that he came to the hospital to, \"get his tits done,\" because he said when he does push ups it releases too much caffeine and the combination of the caffeine and anti-psychotics cause insomnia that lead to psychosis.  Pt states he is willing to stop meds cold turkey to show the provider but would rather ween off the risperdal.  Pt than states,  \"everything around us is made of water and God's voice.\"  Pt informs writer that he is having withdrawal from zyprexa and not going to take it anymore.  Pt continues to be restless, tangential, focused on his meds and making bizarre statements all shift.       "

## 2021-07-09 NOTE — PLAN OF CARE
Music Therapy Group note    Total time in session: 15 minutes    Number of patients in group: 5    Scope of service: psychodynamic     Patient progress: not able to meaningfully participate at this time     Intervention: Future Self Letter    Goal of group: containment (for Sam)    Patient response/reaction to treatment intervention(s): Sam was in and out of the group room.  He would frenetically fill out the written prompts then show to MT, then leave the room and come back.  He was disinhibited, acting oddly and needing redirection to keep a good personal bubble.  He participated to the best of his ability, but at this time his mind appears to be moving so quickly, he cannot attend to sustained group process.  He also indicated dissatisfaction with having to be on medications.     Monica Chamberlain, MT-BC  Board-Certified Music Therapist

## 2021-07-09 NOTE — PROGRESS NOTES
"[facility], [place]   Psychiatric Progress Note  Hospital Day: 10        Interim History:   The patient's care was discussed with the treatment team during the daily team meeting and/or staff's chart notes were reviewed.  Asked by Dr. Hoover to provide second opinion regarding management of Schizoaffective Disorder, bipolar type. Patient is extremely opposed to ongoing treatment with neuroleptic medication. Commitment and Jo are in place. Staff report patient continues to exhibit manic symptoms, including poor insight, impaired judgment, tangential and disorganized thought process, grandiosity, impulsivity, hyper-religiousity, and delusional thought content. He is not sleeping well and not attending to ADLs. He is disheveled.     Upon interview, the patient immediately said \"Thank God! A female doctor. What is your name? Rhea? That all makes sense! Ally. You are my ally!\" Patient was willing to meet with writer in interview room. He appeared quite anxious with tangential, disorganized thought process. He adamantly denies that he has a mental illness and does not feel he needs neuroleptic medications. He said that he \"faked\" mental illness 20 years ago and has been treated inappropriately ever since. Said that he is \"hypersensitive to medications.\" He described himself as an \"empath and optimist, but not manic.\" Throughout interview, he became increasingly more anxious. He said that his goal is to be on Ativan, off of risperidone and switch to once daily dosing of Zyprexa. He said that Lithium and Depakote \"are poison and I am trying to purify my temple! I promise to god I do not need medications!\" Zyprexa reportedly causes Erectile dysfunction and nightmares. He believes that any symptoms he may currently be experiencing are related to drinking water from a stream that may have been contaminated by a \"dead dear or something else that got into it.\"     Suicidal ideation: denies " "current or recent suicidal ideation or behaviors. \"I would never hurt myself.\"     Homicidal ideation: denies current or recent homicidal ideation or behaviors.    Psychotic/manic symptoms: Patient reports that he can hear the voice of God. Evidence of delusional thought content, paranoia, grandiosity, impulsivity, mood lability during interview. Pt reports racing thoughts and heightened anxiety. At one point mentioned that there is \"blood in the water.\"     Medication side effects reported: see above. Also reports sedation though did not appear sedated during interview    Acute medical concerns: none    Other issues reported by patient: Patient had no further questions or concerns.           Medications:       OLANZapine  5 mg Oral BID    Or     OLANZapine  5 mg Intramuscular BID     risperiDONE  1.5 mg Oral QAM    Or     OLANZapine  5 mg Intramuscular QAM     risperiDONE  2.5 mg Oral At Bedtime    Or     OLANZapine  5 mg Intramuscular At Bedtime     omeprazole  40 mg Oral Daily     risperiDONE microspheres ER  37.5 mg Intramuscular Q14 Days          Allergies:     Allergies   Allergen Reactions     Haloperidol Anaphylaxis          Labs:   No results found for this or any previous visit (from the past 24 hour(s)).       Psychiatric Examination:     /89 (BP Location: Left arm)   Pulse 92   Temp 97.4  F (36.3  C) (Tympanic)   Resp 16   Wt 75.8 kg (167 lb)   SpO2 97%   BMI 23.29 kg/m    Weight is 167 lbs 0 oz  Body mass index is 23.29 kg/m .    Weight over time:  Vitals:    07/01/21 0803 07/04/21 0700 07/06/21 0733 07/08/21 0839   Weight: 76.7 kg (169 lb) 75.8 kg (167 lb) 75.7 kg (166 lb 12.8 oz) 75.8 kg (167 lb)       Orthostatic Vitals       Most Recent      Sitting Orthostatic /89 07/09 0853    Sitting Orthostatic Pulse (bpm) 92 07/09 0853    Standing Orthostatic /71 07/09 0853    Standing Orthostatic Pulse (bpm) 109 07/09 0853            Cardiometabolic risk assessment. " "07/09/21      Reviewed patient profile for cardiometabolic risk factors    Date taken /Value  REFERENCE RANGE   Abdominal Obesity  (Waist Circumference)   See nursing flowsheet Women ?35 in (88 cm)   Men ?40 in (102 cm)      Triglycerides  No results found for: TRIG    ?150 mg/dL (1.7 mmol/L) or current treatment for elevated triglycerides   HDL cholesterol  No results found for: HDL]   Women <50 mg/dL (1.3 mmol/L) in women or current treatment for low HDL cholesterol  Men <40 mg/dL (1 mmol/L) in men or current treatment for low HDL cholesterol     Fasting plasma glucose (FPG) Lab Results   Component Value Date     06/30/2021      FPG ?100 mg/dL (5.6 mmol/L) or treatment for elevated blood glucose   Blood pressure  BP Readings from Last 3 Encounters:   07/09/21 117/89   06/29/21 (!) 136/93   02/04/19 129/87    Blood pressure ?130/85 mmHg or treatment for elevated blood pressure   Family History  See family history     Appearance: awake, alert and disheveled   Attitude:  evasive and somewhat cooperative  Eye Contact:  intense  Mood:  anxious, \"afraid\"  Affect:  mood congruent, intensity is heightened, labile and reactive  Speech:  clear, coherent, rapid  Language: fluent and intact in English  Psychomotor, Gait, Musculoskeletal:  no evidence of tardive dyskinesia, dystonia, or tics. Physically tense.   Throught Process:  disorganized, illogical and tangental  Associations:  loosening of associations present  Thought Content:  no evidence of suicidal ideation or homicidal ideation  Insight:  limited  Judgement:  limited  Oriented to:  time, person, and place  Attention Span and Concentration:  limited  Recent and Remote Memory:  limited  Fund of Knowledge:  appropriate    Clinical Global Impressions  First:7     Most recent:6              Precautions:     Behavioral Orders   Procedures     Code 1 - Restrict to Unit     Routine Programming     As clinically indicated     Self Injury Precaution     Sexual " precautions     Status 15     Every 15 minutes.          Diagnoses:     1.  Schizoaffective disorder, bipolar type, currently manic with psychotic features    2.  Cannabis use disorder is highly likely.         Assessment & Plan:     The patient is a 36-year-old gentleman who presented to the Emergency Department alone via emergency medical service on a transport hold for psychiatric evaluation.  It was reported the patient was exhibiting symptoms of erum and psychosis for the last week and progressively decompensated. Patient currently under commitment and Jo. It is unclear whether patient was maintaining adherence to PTA Risperdal Consta. PTA dose is 37.5 mg [id number]. Continues to exhibit severe symptoms of erum and psychosis.     Target psychiatric symptoms and interventions:  1) Would gather additional information regarding duration of treatment on Risperdal Consta and adherence PTA. If non-adherent, may take up to 6 weeks until reaching corresponding level. Would also consider increasing MERLOS to 50 mg [id number].  2) Patient continues to exhibit symptoms of reum in context of scheduled oral risperidone at total daily dose of 4 mg daily. Would recommend increasing to achieve therapeutic dosing. Would consider increase to 3 mg BID while monitoring closely for side effects, up to max dose of 8 mg daily (while taking into account MERLOS adherence and dose adjustments).    3) Patient is requesting once daily dosing of Zyprexa. Consider switching from 5 mg BID to 10 mg at bedtime which may improve sleep.   4) Consider adding non-neuroleptic mood stabilizer. Currently, patient is declining.  5) Consider adding scheduled and/or prn Ativan to patient's regimen temporarily to target symptoms of erum and high anxiety/mild agitation. May then taper after risperidone is further optimized.     Acute Medical Problems and Treatments:  - No acute medical concerns    Behavioral/Psychological/Social:  - Encourage unit  programming    Safety:  - Continue precautions as noted above  - Status 15 minute checks    Legal Status: full commitment with Jo    Disposition Plan   Reason for ongoing admission: poses an imminent risk to self, poses an imminent risk to others and is unable to care for self due to severe psychosis or erum  Discharge location: TBD by primary team  Discharge Medications: not ordered  Follow-up Appointments: not scheduled    Entered by: Ruth Sargent on 7/9/2021 at 11:04 AM

## 2021-07-09 NOTE — PLAN OF CARE
Assessment/Intervention/Current Symtoms and Care Coordination  CTC (writer) met with trx team, provided update, and reviewed pt's chart. No substantial change in pt's case, so no work was done on it today.     Discharge Plan or Goal  Pt will likely be discharged back to home with outpatient follow-up, as per agreement with CM.      Barriers to Discharge   Safe discharge plan, ongoing symptom severity (manic symptomology, active delusions), and medication evaluation/assessment.     Referral Status  None, pt has a poor hx of follow-through and CM agrees that any additional services will likely not be pursued once pt returns to the community.      Legal Status  COMMITMENT with LARON, confirmed 6/30/21 with CM

## 2021-07-09 NOTE — PLAN OF CARE
Problem: Sleep Disturbance (Manic or Hypomanic Signs/Symptoms)  Goal: Improved Sleep (Manic/Hypomanic Signs/Symptoms)  Outcome: No Change  Note: Pt slept through majority of the night. Waking around 0500 for an hour.     NOC Shift Report    Pt in bed at beginning of shift, breathing quiet and unlabored. Pt slept through majority of shift. Pt slept 5.5 hours.     Pt woke up around 0500 and began pacing the walkways, talking to self. Pt presented with tangential, hyper verbal speech and an elated mood. Pt made multiple requests including snacks, an egg crate mattress and headphones. Pt continues with delusional statements and distractible thinking. After about an hour, pt returned to room to rest.     No PRNs given. Will continue to monitor.     Precautions: Sexual, Self-Injury

## 2021-07-09 NOTE — PLAN OF CARE
Problem: Behavioral Health Plan of Care  Goal: Plan of Care Review  Outcome: No Change    Pt paced the hallway for much of the shift. Presented as hypomanic with paranoid delusions. Pt continued to verbalize his dislike for Zyprexa. Appeared to be responding to internal stimuli. Pt did attend the unit group. Reported eating and sleeping as well. No medication side effects reported.   Plan: Status 15s; Build trust with pt. Continue to build on strengths. Encourage compliance and healthy coping.

## 2021-07-10 PROCEDURE — 250N000013 HC RX MED GY IP 250 OP 250 PS 637: Performed by: PSYCHIATRY & NEUROLOGY

## 2021-07-10 PROCEDURE — 124N000002 HC R&B MH UMMC

## 2021-07-10 RX ADMIN — RISPERIDONE 1.5 MG: 1 TABLET ORAL at 09:47

## 2021-07-10 RX ADMIN — OMEPRAZOLE 40 MG: 20 CAPSULE, DELAYED RELEASE ORAL at 09:46

## 2021-07-10 RX ADMIN — OLANZAPINE 5 MG: 5 TABLET, FILM COATED ORAL at 20:50

## 2021-07-10 RX ADMIN — OLANZAPINE 5 MG: 5 TABLET, FILM COATED ORAL at 09:47

## 2021-07-10 RX ADMIN — RISPERIDONE 2.5 MG: 2 TABLET ORAL at 20:50

## 2021-07-10 ASSESSMENT — ACTIVITIES OF DAILY LIVING (ADL)
LAUNDRY: WITH SUPERVISION
ORAL_HYGIENE: INDEPENDENT
DRESS: INDEPENDENT
HYGIENE/GROOMING: INDEPENDENT
DRESS: INDEPENDENT
LAUNDRY: WITH SUPERVISION
ORAL_HYGIENE: INDEPENDENT
HYGIENE/GROOMING: INDEPENDENT

## 2021-07-10 NOTE — PLAN OF CARE
"Problem: Sleep Disturbance (Manic or Hypomanic Signs/Symptoms)  Goal: Improved Sleep (Manic/Hypomanic Signs/Symptoms)  Outcome: No Change  Note: Pt continues to sleep through the first half of the night and wake during the second, coming out to pace and request snacks.     NOC Shift Report    Pt in bed at beginning of shift, breathing quiet and unlabored. Pt slept through majority of shift. Pt slept 6.25 hours.     Pt woke up around 0415 and came out to request snacks and headphones. Pt continues with delusional statements and tangential, rambling speech. Pt presents with a full range affect and elated mood. Pt paced the hallways with hyperactive activity. At one point pt stated, \"I need you to go get me a yogurt because I need protein. If I don't get protein I will eat my muscle.\" Pt giggled to self and walked away. Pt given cheese stick and camomile tea. Pt returned to room to rest. No further pt complaints or concerns at this time.     No PRNs given. Will continue to monitor.     Precautions: Sexual, Self-Injury        "

## 2021-07-10 NOTE — PLAN OF CARE
"  Problem: Cognitive Impairment (Manic or Hypomanic Signs/Symptoms)  Goal: Optimized Cognitive Function (Manic/Hypomanic Signs/Symptoms)  Outcome: No Change  Note: Patient denied all psych symptoms, without being asked. He verbalized his hesitation to take medications, said he is opposed to taking medications as they are toxins. Said he is requesting to stop taking prescribed medications, said he is here to be taken off meds. Said there is no reason for meds.    Thoughts with poor insights of his mental illness, denied having a mental illness. Thoughts are delusional and disorganized. Focused on medications being toxins. Said Risperidone is just a \"sugar pill\", than said that he is recommending to lower the Risperidone before it reaches the full dose.  Said Zyprexa gives him \"rectal dysfunction\", and \"nightmares\". Said Clozaril was detrimental for his kelly, that he has parts of his brain suffering from Alzheimer's. Thoughts are focused on \"withdrawing from caffeine\", that was stored in his \"fat cells\" and released when he exercises. Said he \"got up high\" this morning from the marijuana that is stored in his fat cells from him smoking a lot of marijuana in the past.   Paranoia about wearing a name wrist band, said he will not wear one due to \"security issues\".      Speech is pressured, perseverating, tangential and irrelevant. Patient is seeking staff attention to talk about self. He offers information without being asked. Mood is labile, affect is elated.     He slept in this morning, said he did not sleep well last night. Ate 100% of breakfast. Attended community meeting, and said he will go back to his room to rest.     Took medications as prescribed. Verbalized his recommendations and requests to be taken off medications repeatedly.     Denied pain and all other physical issues. No PRNs requested.     "

## 2021-07-10 NOTE — PLAN OF CARE
"Pt was visible in the milieu for almost the entire shift. He was observed walking in the halls, working out in his room, and conversing with a few of his peers. Pt's affect was full range and his mood was often elated. Pt was hyper-verbal, tangential, and made delusional statements about his medications and body. He told writer \"The sugar pill Santi is giving me isn't doing anything for me. He can just take me off of the Zyprexa and reduce my Risperidone because I am doing great. You know these are just toxins pumping through my body and that's why I work out. I need to work out to release the caffeine and the toxins. The amount of toxins in my system is causing lactic acid accumulating in mass quantities now\". Pt denied all mental health concerns including SI, SIB, AVH, anxiety and depression. He showered this shift and ate all of his meals. Pt was medication compliant and received no PRN's. He did not endorse pain.   "

## 2021-07-10 NOTE — PROGRESS NOTES
"CLINICAL NUTRITION SERVICES - BRIEF NOTE     Nutrition Prescription      Recommendations already ordered by Registered Dietitian (RD):  RD will add to diet order that pt is approved for double protein/entree portions, approved for double snacks portions    Future/Additional Recommendations:  None as no other nutritional concerns identified, RD will sign off, please re-consult if concerns arise      REASON FOR BRIEF ASSESSMENT  Sam Minor is a/an 36 year old male assessed by the dietitian for Patient/Family Request     FINDINGS   Chart reviewed. MAR reviewed. Labs reviewed. RD called unit and spoke with pt over the phone, pt feels he needs to be on a high protein diet, RD reviewed options to obtain additional protein servings, pt politely agreeing to above and is aware to write \"X2\" next to menu items pt wants double servings of.     Ht Readings from Last 1 Encounters:   06/28/21 1.803 m (5' 11\")     Wt Readings from Last 20 Encounters:   07/08/21 75.8 kg (167 lb)   06/28/21 77.1 kg (170 lb)   01/27/19 78.8 kg (173 lb 12.8 oz)     BMI Readings from Last 1 Encounters:   07/08/21 23.29 kg/m    BMI normal     INTERVENTIONS  Implementation  Modify composition of meals/snacks - ordered as above    Monitoring/Evaluation  No nutrition follow-up warranted at this time. RD to sign off. Please re-consult if further needs arise.     Yesenia Skaggs RD, CNSC, LD  [facility] RD pager: [phone]      "

## 2021-07-11 PROCEDURE — 250N000013 HC RX MED GY IP 250 OP 250 PS 637: Performed by: PSYCHIATRY & NEUROLOGY

## 2021-07-11 PROCEDURE — 124N000002 HC R&B MH UMMC

## 2021-07-11 RX ADMIN — OMEPRAZOLE 40 MG: 20 CAPSULE, DELAYED RELEASE ORAL at 09:17

## 2021-07-11 RX ADMIN — RISPERIDONE 1.5 MG: 1 TABLET ORAL at 09:17

## 2021-07-11 RX ADMIN — RISPERIDONE 2.5 MG: 2 TABLET ORAL at 19:33

## 2021-07-11 RX ADMIN — OLANZAPINE 5 MG: 5 TABLET, FILM COATED ORAL at 19:33

## 2021-07-11 RX ADMIN — OLANZAPINE 5 MG: 5 TABLET, FILM COATED ORAL at 09:17

## 2021-07-11 ASSESSMENT — ACTIVITIES OF DAILY LIVING (ADL)
DRESS: INDEPENDENT
HYGIENE/GROOMING: INDEPENDENT
HYGIENE/GROOMING: INDEPENDENT
LAUNDRY: WITH SUPERVISION
DRESS: INDEPENDENT
ORAL_HYGIENE: INDEPENDENT
LAUNDRY: WITH SUPERVISION
ORAL_HYGIENE: INDEPENDENT

## 2021-07-11 NOTE — PLAN OF CARE
Pt presented as labile, delusional, tense, and grandiose. Pt c/o having to take antipsychotic medication. He said it is harming him and that he should not be taking any oral medications. Pt said he wanted his medication doses lowered on Monday or he would jay his provider. He said he only should be taking benadryl and chamomile tea. Pt paced the hallway and often stopped to ask to talk to his RN (this writer) about how he needs to be off his medication. The pt was reassured the provider had his best interest in mind and that his concerns would be communicated to the provider. The pt then retreated to his room and only came out for meals. Pt was medication compliant. No medication side effects were reported or noted. Pt's affect was tense and mood irritable, agitated, tense and anxious. Speech was pressured. Pt denied SI/SIB/HI/AVH. However, the pt appeared to be RIS. Pt has poor insight into his mental illness.

## 2021-07-11 NOTE — PLAN OF CARE
Problem: Sleep Disturbance (Manic or Hypomanic Signs/Symptoms)  Goal: Improved Sleep (Manic/Hypomanic Signs/Symptoms)  Outcome: Improving  Note: Pt slept uninterrupted through the night.     NOC Shift Report    Pt in bed at beginning of shift, breathing quiet and unlabored. Pt slept through shift. Pt slept 7 hours.     No pt complaints or concerns at this time.     No PRNs given. Will continue to monitor.     Precautions: Sexual, Self-Injury

## 2021-07-11 NOTE — PLAN OF CARE
"MH Assessment    Patient is alert and oriented, Hyperactive but cooperative. Patient's affect is full-range and labile. Speech is pressured. Patient's insight is Poor. Patient is Neatly groomed.  Patient has been present in the milieu, Pacing the halls, and socializing with peers. The patient voices their needs appropriately but poor insight to his situation. He is demanding for the Doctor to discontinue his antipsychotics. He stated, \" I just want to make sure you pass on to the Doctor to discontinue my Risperdal and Zyprexa but I will take ativan and benadryl as needed.\" Patient denies all MH symptoms but appears delusional, talks nonsensical, and responds to internal stimuli. Patient has been eating, hydrating, and sleeping adequately. Patient is medication compliant, but needed reminders. Medication side effects were not observed or reported this shift.From what writer could assess, patient's skin is intact, free from injuries or open sores. Plan is to continue to monitor patient status q 15 mins, assess response to medications, and maintain the patients safety.    Vital signs are stable  Denied pain    "

## 2021-07-11 NOTE — PLAN OF CARE
"MH Assessment    Patient was alert and oriented, but disoriented to situation. Patient was irritable and labile. Affect is tense, but bright at times. Speech is pressured. Insight is poor. Patient has been  present in the milieu, pacing the halls, and socializes with peers. The patient reported he wants the Doctor to discontinue his antipsychotics. Patient refused education. He stated, \" they are nothing but sugar pills and it clogs my brain and makes everything worse.\" Patient denies all MH symptoms but is responding to internal stimuli. He presents delusional and talks nonsensical. Patient has  been eating, hydrating, and sleeping adequately. Patient is medication compliant, doesn't need reminders. Medication side effects were not observed or reported this shift. From what writer could assess, patient's skin is intact, free from injuries or open sores. Plan is to continue to monitor patient status q 15 mins, assess response to medications, and maintain the patients safety.    Vital signs are stable  Denied pain            "

## 2021-07-12 PROCEDURE — 99232 SBSQ HOSP IP/OBS MODERATE 35: CPT | Performed by: PSYCHIATRY & NEUROLOGY

## 2021-07-12 PROCEDURE — 250N000013 HC RX MED GY IP 250 OP 250 PS 637: Performed by: PSYCHIATRY & NEUROLOGY

## 2021-07-12 PROCEDURE — 124N000002 HC R&B MH UMMC

## 2021-07-12 RX ORDER — OLANZAPINE 10 MG/2ML
5 INJECTION, POWDER, FOR SOLUTION INTRAMUSCULAR AT BEDTIME
Status: DISCONTINUED | OUTPATIENT
Start: 2021-07-12 | End: 2021-07-16

## 2021-07-12 RX ORDER — OLANZAPINE 10 MG/2ML
5 INJECTION, POWDER, FOR SOLUTION INTRAMUSCULAR EVERY MORNING
Status: DISCONTINUED | OUTPATIENT
Start: 2021-07-13 | End: 2021-07-16

## 2021-07-12 RX ORDER — RISPERIDONE 50 MG/2 ML
50 KIT INTRAMUSCULAR
Status: DISCONTINUED | OUTPATIENT
Start: 2021-07-12 | End: 2021-07-14

## 2021-07-12 RX ADMIN — DIPHENHYDRAMINE HYDROCHLORIDE 50 MG: 25 CAPSULE ORAL at 22:36

## 2021-07-12 RX ADMIN — OLANZAPINE 5 MG: 5 TABLET, FILM COATED ORAL at 19:57

## 2021-07-12 RX ADMIN — OLANZAPINE 5 MG: 5 TABLET, FILM COATED ORAL at 09:25

## 2021-07-12 RX ADMIN — RISPERIDONE 3.5 MG: 3 TABLET ORAL at 19:57

## 2021-07-12 RX ADMIN — DIPHENHYDRAMINE HYDROCHLORIDE 50 MG: 25 CAPSULE ORAL at 15:10

## 2021-07-12 RX ADMIN — RISPERIDONE 1.5 MG: 1 TABLET ORAL at 09:25

## 2021-07-12 RX ADMIN — OMEPRAZOLE 40 MG: 20 CAPSULE, DELAYED RELEASE ORAL at 09:25

## 2021-07-12 ASSESSMENT — ACTIVITIES OF DAILY LIVING (ADL)
LAUNDRY: WITH SUPERVISION
DRESS: INDEPENDENT
HYGIENE/GROOMING: SHOWER;INDEPENDENT
ORAL_HYGIENE: INDEPENDENT

## 2021-07-12 NOTE — PROGRESS NOTES
"[facility], [place]   Psychiatric Progress Note        Interim History:     The patient's care was discussed with the treatment team during the daily team meeting and/or staff's chart notes were reviewed.  Per staff, Sam continues to be tense and nonsensical, and at times irritable regarding his psych medications. Patient slept 7 hours last night and appears to be responding to internal stimuli. [facility] spoke with CM, both agree with plan to discharge to parents when patient is stable. [facility] spoke with Ry father who requested for provider to call him.      Met with patient: in the lounge. Patient was frequently irritated during our conversation. Sam states that he received an \"update from higher owusu\" saying that he \"has ulcers in his stomach and that is why he is in the hospital\". It was explained to Sam that he is on a mental health unit and not a medical floor but patient was persistent about his \"ulcers\" and that he plans to stay another 10 days to get better. Patient continues to try to change his medication regimen and continues to ask to discontinue Risperidone which was explained that we will not be able to do this at this time. We informed patient of our plans to increase Risperidone, patient was upset saying things such as \"will spin out of control\", \"will yell rape\", and \"will jay for malpractice\". Patient says that he will \"act\" better if we increase his dose to be discharged as he is a \"good actor\" but will then not take his oral medications outside the hospital. Sam states that he and his outside provider have a plan to \"detox\" from his medications, regarding to stopping them. Patient states that asking for a second opinion was a mistake and that he should have never done that.     After visit with the patient I had 30 min long phone conversation with Sam's dad who is also called Sam. Dad voiced concerns about his son's chronic mental illness and his " minimal insight into having it, his historically poor compliance with meds. We discussed Sam's meds. I explained to dad that we had to treat Sam with high doses of meds because he didn't seem to be responding to the lower doses. I also told dad that his son was seen by 2nd opinion Dr. Sargent who also recommended to treat Sam with max recommended doses of Risperdal. I promised to dad to talk to Dr. Crooks who is a Sam's outpatient psychiatrist. We discussed applying for guardianship. Dad didn't feel that he and his wife, Sam's mom could do that and showed no interest in this topic.             Medications:       [START ON 7/13/2021] risperiDONE  2.5 mg Oral QAM    Or     [START ON 7/13/2021] OLANZapine  5 mg Intramuscular QAM     risperiDONE  3.5 mg Oral At Bedtime    Or     OLANZapine  5 mg Intramuscular At Bedtime     OLANZapine  5 mg Oral BID    Or     OLANZapine  5 mg Intramuscular BID     omeprazole  40 mg Oral Daily     risperiDONE microspheres ER  37.5 mg Intramuscular Q14 Days          Allergies:     Allergies   Allergen Reactions     Haloperidol Anaphylaxis          Labs:     No results found for this or any previous visit (from the past 24 hour(s)).       Psychiatric Examination:     /88   Pulse 105   Temp 97.8  F (36.6  C) (Oral)   Resp 16   Wt 75.8 kg (167 lb)   SpO2 97%   BMI 23.29 kg/m    Weight is 167 lbs 0 oz  Body mass index is 23.29 kg/m .    Orthostatic Vitals       Most Recent      Sitting Orthostatic /88 07/12 0844    Sitting Orthostatic Pulse (bpm) 105 07/12 0844    Standing Orthostatic /87 07/12 0844    Standing Orthostatic Pulse (bpm) 123 07/12 0844          Appearance: awake, alert and dressed in hospital scrubs.  Attitude:  Less cooperative   Eye Contact:  good , at times intense  Mood:  angry and irritable  Affect:  mood congruent, intensity is heightened and intensity is dramatic  Speech:  clear, coherent and less pressured speech  Psychomotor  Behavior:  no evidence of tardive dyskinesia, dystonia, or tics; appeared physically agitated. See above.   Throught Process: still disorganized and illogical  Associations:  loosening of associations present  Thought Content:  no evidence of homicidal ideation, no visual hallucinations present, although, staff report that he seems to responding to internal stimuli and has auditory hallucinations. No concerns about Suicidal ideation.  Insight:  poor  Judgement:  poor  Oriented to:  time, person, and place  Attention Span and Concentration:  poor  Recent and Remote Memory:  poor    Clinical Global Impressions  First: 7/4 6/30/2021      Most recent: 6/4 7/7/2021             Precautions:     Behavioral Orders   Procedures     Code 1 - Restrict to Unit     Routine Programming     As clinically indicated     Self Injury Precaution     Sexual precautions     Status 15     Every 15 minutes.          DIagnoses:     1.  Schizoaffective disorder, bipolar type, erum.    2.  Cannabis use disorder is highly likely.         Plan:     Patient is committed and jarvised. Jo meds are: Prolixin, Risperdal, Zyprexa and Thorazine. Got Risperdal Consta 37.5 mg. Continue Zyprexa 5 mg BID. Will increase PO Risperdal today and next shot of Consta to 50 mg in 2 days. Will continue to update patient's father to discuss patient and medication management. Will continue to provide structure and support. Will also discuss Sam's treatment with his outpatient psychiatrist Dr. Crooks.    Martha Forbes, PA-Student     I was present with PA student who participated in the service and in the documentation of the note. I have verified the history and personally performed the physical exam and medical decision making. I agree with the assessment and plan of care as documented in the note.     Santi Hoover MD  [facility]

## 2021-07-12 NOTE — PLAN OF CARE
Night Shift Summary (7/11/21 into 07/12/21)    Pt in bed sleeping at start of shift. Breathing quiet and unlabored. Awake intermittently during the night. Out x 1 for snack, a few time-checks, and to request a piece of paper. Quiet and appropriate.  Appears to have slept 7 hours.    Suicide and Self-injury precautions in addition to No Roommate order, with no related events occurring this shift.     Will continue to monitor and assess.       Problem: Behavioral Health Plan of Care  Goal: Absence of New-Onset Illness or Injury  Outcome: Improving   Remains safe on the unit.     Problem: Sleep Disturbance (Manic or Hypomanic Signs/Symptoms)  Goal: Improved Sleep (Manic/Hypomanic Signs/Symptoms)  Outcome: Improving   Slept ? 6 hours.

## 2021-07-12 NOTE — PLAN OF CARE
"Patient has been in and out of his room. He mostly paced in the hallways with headphones on. He is irritable and tense. Social with some peers. He did not attend groups this shift. He denies SI/SIB/HI/AVH. Poor insight about his MI. He wishes to be off antipsychotic medications which he references as 'sugar pills.\" Medication compliant. Slept okay last night. Good appetite. Received prn benadryl at 1510.   "

## 2021-07-12 NOTE — PLAN OF CARE
Assessment/Intervention/Current Symtoms and Care Coordination  ANGEL (writer) met with trx team, provided update, and reviewed pt's chart. [facility] spoke briefly with pt who requested that CTC speak with his father. Pt also expressed being unhappy about his meds and felt that he might jay the doctor due to malpractice. [facility] did not engage pt further in this line of thought, due to pt's ongoing fixed dellusions that medications are poisonous to him.     [facility] received a call from pt's father, who left a VM requesting a callback. [facility] called pt's father, Edward [phone], who expressed that pt's manic symptoms are still actute and that pt's not at baseline. Edward confirmed that pt has a hard time staying med compliant in the community and that pt lacks insight into needing to remain on his meds. Edward also expressed concern about pt's medication, which ANGEL offered to pass onto attending to address those specific questions/concern. [facility] discussed the discharge plan and that at this time the trx team determined pt would not follow-through on any community referrals. Furthermore, trx team felt that pt would be discharged back home if his family would allow him to return, which Edward confirmed would be an option. [facility] explained the they had spoke with pt's CM, who was also in agreement at this time due to pt's pattern of past behavior. Additionally, ANGEL and CM were discussing other potential discharge options that would potentially include [facility], but that had not beed decided yet due to the wait-time to get into that program. [facility] agreed that pt is not at baseline based on CM and Edward's report, so is likely not ready for discharge yet. [facility] agreed to pass onto attending that Edward would like a phone call and that Edward recommended that attending speak to pt's outpatient provider, Dr. Crooks, prior to discharge as well about med recommendations.      [facility] passed info onto attending and will discuss it again tomorrow morning during trx team's  meeting.     Discharge Plan or Goal  Pt will likely be discharged back to home with outpatient follow-up, as per agreement with CM.      Barriers to Discharge   Safe discharge plan, ongoing symptom severity (manic symptomology, active delusions), and medication evaluation/assessment.     Referral Status  None, pt has a poor hx of follow-through and CM agrees that any additional services will likely not be pursued once pt returns to the community.      Legal Status  COMMITMENT with [facility], confirmed 6/30/21 with CM

## 2021-07-13 PROCEDURE — 250N000013 HC RX MED GY IP 250 OP 250 PS 637: Performed by: PSYCHIATRY & NEUROLOGY

## 2021-07-13 PROCEDURE — G0177 OPPS/PHP; TRAIN & EDUC SERV: HCPCS

## 2021-07-13 PROCEDURE — 99232 SBSQ HOSP IP/OBS MODERATE 35: CPT | Performed by: PSYCHIATRY & NEUROLOGY

## 2021-07-13 PROCEDURE — 124N000002 HC R&B MH UMMC

## 2021-07-13 RX ADMIN — OMEPRAZOLE 40 MG: 20 CAPSULE, DELAYED RELEASE ORAL at 08:12

## 2021-07-13 RX ADMIN — RISPERIDONE 2.5 MG: 2 TABLET ORAL at 08:12

## 2021-07-13 RX ADMIN — OLANZAPINE 5 MG: 5 TABLET, FILM COATED ORAL at 20:14

## 2021-07-13 RX ADMIN — OLANZAPINE 5 MG: 5 TABLET, FILM COATED ORAL at 08:12

## 2021-07-13 RX ADMIN — DIPHENHYDRAMINE HYDROCHLORIDE 50 MG: 25 CAPSULE ORAL at 20:16

## 2021-07-13 RX ADMIN — DIPHENHYDRAMINE HYDROCHLORIDE 50 MG: 25 CAPSULE ORAL at 08:42

## 2021-07-13 RX ADMIN — RISPERIDONE 3.5 MG: 3 TABLET ORAL at 20:14

## 2021-07-13 NOTE — PROGRESS NOTES
Behavioral Health  Note    Behavioral Health  Spirituality Group Note    UNIT [address]    Name: Sam Minor YOB: 1985   MRN: [id number] Age: 36 year old      Patient attended -led group, which included discussion of spirituality, coping with illness and building resilience.    Patient attended group for 1.0 hrs.    The patient actively participated in group discussion and patient demonstrated an appreciation of topic's application for their personal circumstances.      Deborah Obando  Chaplain Resident  Pager: [phone]

## 2021-07-13 NOTE — PLAN OF CARE
Patient paced in the hallway majority of the morning hours  with head phones on, He was noted to be laughing at himself, appears to be responding to internal stimuli. He denies all MH symptoms.  He took his medications as scheduled but still thinks they are poison and that he is not supposed to take them. Speech is pressured, tangential and rambling. Affect is flat, blunted and irritable at times. Mood is labile. Patient did not attend groups this shift. Staff will continue to monitor.

## 2021-07-13 NOTE — PLAN OF CARE
Problem: Sleep Disturbance (Manic or Hypomanic Signs/Symptoms)  Goal: Improved Sleep (Manic/Hypomanic Signs/Symptoms)  Outcome: No Change  Note: Pt continues to sleep through the night with no concerns noted.     NOC Shift Report    Pt in bed at beginning of shift, breathing quiet and unlabored. Pt slept through majority of shift. Pt slept 6.25 hours.     No pt complaints or concerns at this time.     No PRNs given. Will continue to monitor.     Precautions: Sexual, Self-Injury

## 2021-07-13 NOTE — PLAN OF CARE
Problem: Behavioral Health Plan of Care  Goal: Plan of Care Review  Outcome: No Change  Flowsheets  Taken 7/12/2021 2151  Plan of Care Reviewed With: patient  Patient Agreement with Plan of Care: agrees  Taken 7/12/2021 2134  Plan of Care Reviewed With: patient  Patient Agreement with Plan of Care: agrees     Patient was pacing off and on around the unit this shift. Still the same, labile, calm, tense, anxious, irritable at times. Didn't want the increase of his Risperdal. Stated he has diarrhea because of the medication. Telling writer to tell the doctor about the side effect of the Risperdal and does not really need to be in it. He continues to deny his mental health illness. Denied having depression, anxiety, hallucinations, SI, SIB. Denied pain. Ate well. Compliant with taking his meds. Will continue to monitor and assess pt.

## 2021-07-13 NOTE — PLAN OF CARE
Assessment/Intervention/Current Symtoms and Care Coordination  CTC (writer) met with trx team, provided update, and reviewed pt's chart. CTC completed weekly plan of care team note. CTC informed pt that he spoke with his father, as per pt's request yesterday and that they had encouraged attending to speak with pt's father as well. Pt informed CTC that he was going to ask his outpatient psychiatrist to reach out to speak with attending here as well. Pt expressed no other concerns or questions at this time. Attending confirmed that he spoke with pt's father yesterday and that he is going to try to switch pt to another med that family felt was more helpful in the past. Pt appeared to be physically calmer today, with less pacing, but trx team reports that pt is endorsing talking with god. As per CM, religiosity based delusion symptoms are a sign that pt is not doing well and/or his symptoms may worsen in the near future.      Discharge Plan or Goal  Pt will likely be discharged back to home with outpatient follow-up, as per agreement with CM.      Barriers to Discharge   Safe discharge plan, ongoing symptom severity (manic symptomology, active delusions), and medication evaluation/assessment.     Referral Status  None, pt has a poor hx of follow-through and CM agrees that any additional services will likely not be pursued once pt returns to the community.      Legal Status  COMMITMENT with [facility], confirmed 6/30/21 with CM

## 2021-07-13 NOTE — PLAN OF CARE
BEHAVIORAL TEAM DISCUSSION    Participants: Dr. Santi Hoover MD, Mildred Armas RN, Stuart Alvarez [facility]  Progress: minimal improvement, pt continues to present as manic and trx team believes that pt is masking psychotic symptoms as well at this time. Pt has endorsed delusional thinking related to his medication being harmful to him and believes that his issues are physical not mental. Furthermore, pt appears to be responding to internal stimuli (ei. talking to himself) and at times can make statement that indicate some paranoia as well. Trx team has spoken to family, who also indicate that pt is not at baseline and is still more manic/unbalanced then baseline. Family report that pt comes to the hospital about every 2-3 months and it takes a few weeks for pt to clear to discharge. Family also provided feedback on medication that has been more effective in the past, which attending is taking into account now. Attending confirmed that a second opinion was pursued already as per pt's request and pt was seen.   Anticipated length of stay: 7 more days.   Continued Stay Criteria/Rationale: Medication adjustments and ongoing symptom severity (erum and acute psychosis)  Medical/Physical: None reported in team  Precautions:   Behavioral Orders   Procedures    Code 1 - Restrict to Unit    Routine Programming     As clinically indicated    Self Injury Precaution    Sexual precautions    Status 15     Every 15 minutes.     Plan: Pt will be provided medication changes, trx team will work with outpatient providers to coordinate community services, and be discharged back home with follow-up appointments.  Rationale for change in precautions or plan: Ongoing stabilization and aftercare support efforts.

## 2021-07-13 NOTE — PROGRESS NOTES
"[facility], [place]   Psychiatric Progress Note        Interim History:     The patient's care was discussed with the treatment team during the daily team meeting and/or staff's chart notes were reviewed.  Per staff, Sam continues to be irritable and manic. We spoke with pharmacist regarding medications. Requested pharamcy consult, pharmacist recommended to increase Risperidol Consta to 50 mg for his next dose on Thursday but to not increase oral Risperidol at this time.      Met with patient: in his room, patient was laying on his bed with no shirt but remained covered. Patient continues to be disorganized and has less pressured speech. Sam was somewhat cooperative today. Sam says that he will not take oral medications but that he is legally bound to take his two scheduled IM medications. Patient continues to be delusional making statements such as that he \"talks to god and that he hears a women's voice that is god\". Additionally, Sam at times makes nonsensical statements such as \"such good math\". We informed patient of pharmacies recommendations to increase Risperidol and told him that we spoke with his father last night. Patient made comments regarding the Risperidol increase such as \"if you put 50 in my arm I will jay you\" continues to make remarks that he is trying to wean off medication and that all antipsychotics are poison, except for ativan. Patient requested that we speak with his provider Dr. Weinstein's. Sam says \"I lead and he follows\" regarding his desire to wean off medications and says that it is easier if he just \"picks his poison\". Patient had no further questions or concerns.          Medications:       risperiDONE  2.5 mg Oral QAM    Or     OLANZapine  5 mg Intramuscular QAM     risperiDONE  3.5 mg Oral At Bedtime    Or     OLANZapine  5 mg Intramuscular At Bedtime     OLANZapine  5 mg Oral BID    Or     OLANZapine  5 mg Intramuscular BID     omeprazole  " 40 mg Oral Daily     risperiDONE microspheres ER  50 mg Intramuscular Q14 Days          Allergies:     Allergies   Allergen Reactions     Haloperidol Anaphylaxis          Labs:     No results found for this or any previous visit (from the past 24 hour(s)).       Psychiatric Examination:     /81 (BP Location: Left arm)   Pulse 80   Temp 98.4  F (36.9  C) (Oral)   Resp 16   Wt 76.2 kg (168 lb 1.6 oz)   SpO2 95%   BMI 23.45 kg/m    Weight is 168 lbs 1.6 oz  Body mass index is 23.45 kg/m .    Orthostatic Vitals       Most Recent      Sitting Orthostatic /81 07/13 0744    Sitting Orthostatic Pulse (bpm) 80 07/13 0744    Standing Orthostatic /76 07/13 0744    Standing Orthostatic Pulse (bpm) 98 07/13 0744          Appearance: awake, alert and dressed in hospital scrubs. Laying in bed with his shirt off.   Attitude: somewhat cooperative   Eye Contact:  poor , at times intense  Mood: labile, but less irritable and angry   Affect:  mood congruent and intensity is dramatic at times   Speech:  clear, coherent and less pressured speech  Psychomotor Behavior:  no evidence of tardive dyskinesia, dystonia, or tics; appeared less physically agitated. See above.   Throught Process: still disorganized and illogical  Associations:  loosening of associations present  Thought Content:  no evidence of homicidal ideation, no visual hallucinations present, although, auditory hallucinations present. No concerns about Suicidal ideation.  Insight:  poor  Judgement:  poor  Oriented to:  time, person, and place  Attention Span and Concentration:  poor  Recent and Remote Memory:  poor    Clinical Global Impressions  First: 7/4 6/30/2021      Most recent: 6/4 7/7/2021             Precautions:     Behavioral Orders   Procedures     Code 1 - Restrict to Unit     Routine Programming     As clinically indicated     Self Injury Precaution     Sexual precautions     Status 15     Every 15 minutes.          DIagnoses:     1.   Schizoaffective disorder, bipolar type, erum.    2.  Cannabis use disorder is highly likely.         Plan:     Patient is committed and jarvised. Jo meds are: Prolixin, Risperdal, Zyprexa and Thorazine. Got Risperdal Consta 37.5 mg. Continue Zyprexa 5 mg BID. Will not make dose adjustments to PO Risperdal today and will plan for next shot of Consta increase to 50 mg in 1 day. Will continue to update patient's father to discuss patient and medication management. Will also discuss Sam's treatment with his outpatient psychiatrist Dr. Crooks. Will continue to provide structure and support.     Per patient, Dr. Weinstein's phone number is [phone].     SARAVANAN Whitaker-Student     I was present with PA student who participated in the service and in the documentation of the note. I have verified the history and personally performed the physical exam and medical decision making. I agree with the assessment and plan of care as documented in the note.     Santi Hoover MD  [facility]

## 2021-07-14 PROCEDURE — 99232 SBSQ HOSP IP/OBS MODERATE 35: CPT | Performed by: PSYCHIATRY & NEUROLOGY

## 2021-07-14 PROCEDURE — 250N000013 HC RX MED GY IP 250 OP 250 PS 637: Performed by: PSYCHIATRY & NEUROLOGY

## 2021-07-14 PROCEDURE — 124N000002 HC R&B MH UMMC

## 2021-07-14 RX ORDER — RISPERIDONE 50 MG/2 ML
50 KIT INTRAMUSCULAR
Status: DISCONTINUED | OUTPATIENT
Start: 2021-07-15 | End: 2021-07-23 | Stop reason: HOSPADM

## 2021-07-14 RX ADMIN — OLANZAPINE 5 MG: 5 TABLET, FILM COATED ORAL at 19:12

## 2021-07-14 RX ADMIN — RISPERIDONE 3.5 MG: 3 TABLET ORAL at 19:12

## 2021-07-14 RX ADMIN — OMEPRAZOLE 40 MG: 20 CAPSULE, DELAYED RELEASE ORAL at 11:17

## 2021-07-14 RX ADMIN — DIPHENHYDRAMINE HYDROCHLORIDE 50 MG: 25 CAPSULE ORAL at 14:48

## 2021-07-14 RX ADMIN — RISPERIDONE 2.5 MG: 2 TABLET ORAL at 11:17

## 2021-07-14 RX ADMIN — OLANZAPINE 5 MG: 5 TABLET, FILM COATED ORAL at 11:16

## 2021-07-14 ASSESSMENT — ACTIVITIES OF DAILY LIVING (ADL)
HYGIENE/GROOMING: INDEPENDENT
DRESS: INDEPENDENT
LAUNDRY: WITH SUPERVISION
ORAL_HYGIENE: INDEPENDENT

## 2021-07-14 NOTE — PHARMACY-CONSULT NOTE
Pharmacy Consult    Pharmacist was consulted by Dr. Hoover:  Please, evaluate for possible adjustment in meds.      Pharmacist clarified consult request with Dr. Hoover and obtained additional information:   History of poor medication adherence, so a long acting injectable antipsychotic is the only realistic option. Patient is diagnosed with schizoaffective disorder and is currently committed and jarvised (chlorpromazine, fluphenazine, olanzapine, risperidone).  Provider is looking for guidance on oral risperidone overlap until risperidone consta dose is optimized.      Mr. Minor is a 36 year old male admitted to [facility] on 6/29/2021 with worsening manic and psychotic symptoms in the context of poor medication adherence. Writer conducted chart review of this hospitalization including H&P and provider progress notes.     Prior to Admission (PTA) Psychotropic Medications (per pharmacist s admission medication history on 6/29/2021):   1) Risperidone Consta 37.5mg IM Q2 weeks (received two injections PTA, see fill history below)  Fill history:   6/17/2021: 37.5mg  6/4/2021: 37.5mg    5/14/2021: 37.5mg    3/23/2021: 50mg  3/3/2021: 50mg    2/11/2021: 50mg     July 2020 to Oct 2020: Consistently filled 50mg IM Q 2 weeks (unknown how well this controlled symptoms)      Medication changes during hospitalization:   1) Risperidone Consta 37.5mg q 2 weeks: No change, received dose on 7/1/2021 as scheduled  2) Started 7/2: Risperidone PO 1.5mg AM + 2.5mg HS with back-up IM olanzapine due to Jo  a. Dose increased 7/13: 2.5mg PO AM + 3.5mg PO HS  3) Added 7/6: olanzapine 5mg PO BID     Based on treatment team s progress notes, patient has had some improvement in sleep, but overall remains manic and psychotic.        Assessment:   The patient has only received three injections of risperidone consta 37.5mg IM Q 2 weeks (6/4/2021, 6/17/2021, and 7/1/2021).  Based on the pharmacokinetics of this long-acting  injectable antipsychotic, steady state is not reached until the fourth injection, which is due 7/15/2021, so the potential full effects of this medication have not yet been reached.  Based on current psychotic symptoms, optimizing the dose to 50mg IM Q 2 weeks would likely be beneficial.  Until steady state is reached at this higher dose, patient will likely require oral risperidone to manage symptoms.  Given patient s history of poor medication adherence and negative beliefs about antipsychotics, the oral formulation could be switched to the orally dissolving tablet (ODT) to ensure medication adherence.     Patient is also prescribed olanzapine 5mg by mouth twice daily, but given concern for polypharmacy of multiple scheduled antipsychotics, an alternative medication to manage agitation and irritability in the setting of manic and psychotic symptoms would be preferred. Short term use in a controlled setting (e.g., hospitalization) of benzodiazepines, such as lorazepam, has been shown to be beneficial in this situation and can also be helpful for sleep.  Benefits vs. risks of benzodiazepine use in individuals with a history of substance use must be considered. However, based on chart review, it appears patient s drug of choice is THC.      Patient has not utilized any as needed hydroxyzine or trazodone. Consider discontinuing to minimize polypharmacy.       Recommendations:  1) Increase risperidone consta dose to 50mg IM Q2 weeks, next dose due 7/15/2021.   2) Taper to discontinue olanzapine; olanzapine 5mg PO HS for 3 days, then discontinue.  3) Decrease oral risperidone dose back to 1.5mg by mouth in the morning and 2.5mg by mouth at bedtime and continue through 8/5/2021. Then decrease dose to 1 or 2mg per day (depending on symptoms and tolerability) and continue until 8/26/2021, when the fourth 50mg injection is due.   4) Consider switching oral risperidone formulation from tablet to orally dissolving tablet  (ODT) to ensure medication adherence with linked IM olanzapine for PO refusal (due to guo order).    5) Consider adding lorazepam 1mg by mouth once or twice daily for management of agitation and irritability during hospitalization.    6) Consider discontinuing PRN hydroxyzine and trazodone (no doses given to date) to minimize polypharmacy.   7) Obtain baseline DISKUS and monitor for s/s EPSE      Thank you for the consult.  Recommendations discussed with Dr. Hoover.     Ava Taylor, Pharm.D., [facility]  Behavioral Health Inpatient Pharmacist  [facility] ([facility])  Phone: *[phone] ([facility]) or [phone]

## 2021-07-14 NOTE — PLAN OF CARE
Pt denies any mental health concerns.  Pt denies anxiety, depression, hearing voices, SI/SIB or HI.  Pt is hyperactive pacing halls, stopping at nurses station making bizarre statements regarding his medications, frost-bite, and other bizarre health concerns.  Pt informs writer he will put a GPS tracking device on his daughter if he ever has one and than switches rapidly from subject to subject.  Pt is med compliant and cooperative.  Pt is redirectable.

## 2021-07-14 NOTE — PROGRESS NOTES
"[facility], [place]   Psychiatric Progress Note        Interim History:     The patient's care was discussed with the treatment team during the daily team meeting and/or staff's chart notes were reviewed.  Per staff, Sam has been sleeping this morning. Patient is due for Risperidol Consta, 50 mg, tomorrow.      Met with patient: in the conference room. Patient was initially calm during our conversation but was irritable and agitated towards the end regarding his Risperdal Consta IM 50 mg dose increase. Sam states that he needs to be sedated at all times due to stomach ulcers that are unable to heal. Sam states the the ulcers are not getting worse, omeprazole is helpful, and that are the same as they have been for the \"past 18 months\". We informed Sam that we spoke to Dr. Crooks who agreed with the dose increase of Risperdal Consta to 50 mg. Patient was grateful that we spoke to Dr. Crooks but states that Dr. Weinstein's would never agree with the dose increase as they are \"working to wean off medication\". Sam frequently contradicted himself during our conversation at times saying he was trying to wean off medications completely and other times saying that he was weaning off of Risperdal to switch to another medication. Patient stated multiple times that he will jay us if we increase the Risperdal Consta and that he will become suicidal with the dose increase. We attempted to discuss Prolixin  as an option and patient stated \"Prolixin almost killed me\". At the same time, patient says that he does not take oral medications and that he never misses his IM doses outpatient. Sam says that if we increase his Risperidol, in three days he will \"pretend\" to be better so that he can leave and wean himself off meds when he is discharged. Sam also says that he will quit smoking weed as it \"ignites\" antipsychotics. Patient had no further questions or concerns. Patient was later " seen yelling at someone on the phone regarding the Risperidol Consta dose increase and yelled very loudly in the black before excusing himself to his room. Sam later apologized for his yelling but again stated that he will become suicidal with the dose increase, begging for a second chance.          Medications:      risperiDONE  2.5 mg Oral QAM    Or    OLANZapine  5 mg Intramuscular QAM    risperiDONE  3.5 mg Oral At Bedtime    Or    OLANZapine  5 mg Intramuscular At Bedtime    OLANZapine  5 mg Oral BID    Or    OLANZapine  5 mg Intramuscular BID    omeprazole  40 mg Oral Daily    [START ON 7/15/2021] risperiDONE microspheres ER  50 mg Intramuscular Q14 Days          Allergies:     Allergies   Allergen Reactions    Haloperidol Anaphylaxis          Labs:     No results found for this or any previous visit (from the past 24 hour(s)).       Psychiatric Examination:     /88 (BP Location: Left arm)   Pulse 101   Temp 98.5  F (36.9  C) (Oral)   Resp 16   Wt 76.2 kg (168 lb 1.6 oz)   SpO2 98%   BMI 23.45 kg/m    Weight is 168 lbs 1.6 oz  Body mass index is 23.45 kg/m .    Orthostatic Vitals         Most Recent      Sitting Orthostatic /88 07/14 1149    Sitting Orthostatic Pulse (bpm) 101 07/14 1149    Standing Orthostatic /96 07/14 1149    Standing Orthostatic Pulse (bpm) 109 07/14 1149            Appearance: awake, alert and dressed in hospital scrubs. Initially appeared fatigued.   Attitude: somewhat cooperative   Eye Contact:  fair, at times intense  Mood: labile,  but at times  irritable  and angry   Affect:  mood congruent and intensity is dramatic at times   Speech:  clear, coherent and less pressured speech  Psychomotor Behavior:  no evidence of tardive dyskinesia, dystonia, or tics; appeared less physically agitated. See above.   Throught Process: still illogical and still somewhat disorganized but improving  Associations:  loosening of associations present  Thought Content:  no evidence  of homicidal ideation, no visual hallucinations present, although, auditory hallucinations present. No concerns about Suicidal ideation.  Insight:   poor  Judgement:  poor  Oriented to:  time, person, and place  Attention Span and Concentration:  poor  Recent and Remote Memory:  poor    Clinical Global Impressions  First: 7/4 6/30/2021      Most recent: 5/4 7/14/2021             Precautions:     Behavioral Orders   Procedures    Code 1 - Restrict to Unit    Routine Programming     As clinically indicated    Self Injury Precaution    Sexual precautions    Status 15     Every 15 minutes.          DIagnoses:     1.  Schizoaffective disorder, bipolar type, erum.    2.  Cannabis use disorder is highly likely.         Plan:     Patient is committed and jarvised. Jo meds are: Prolixin, Risperdal, Zyprexa and Thorazine. Got Risperdal Consta 37.5 mg. Continue Zyprexa oral and oral Risperdal. Next shot of Consta due tomorrow will increase to 50 mg.  Will continue to provide structure and support.     SARAVANAN Whitaker-Student     I was present with PA student who participated in the service and in the documentation of the note. I have verified the history and personally performed the physical exam and medical decision making. I agree with the assessment and plan of care as documented in the note.     Santi Hoover MD  [facility]

## 2021-07-14 NOTE — PLAN OF CARE
"Assessment/Intervention/Current Symtoms and Care Coordination  CTC (writer) met with trx team, provided update, and reviewed pt's chart. Attending confirmed that he spoke with pt's outpatient provider, Dr. Crooks, yesterday and that they had a productive conversation. Upon observation pt appears to be slowing down and pacing less overall. However, when talking with pt today he was very pre-occupied with his medication and it's \"poisoness\" effect on his body. Pt also endorsed hearing god respond to his questions, as well as communicating with his  grandfather. Pt appeared more delusional today and less manic, though his speech continues to be pressured and can be tangential at times. Pt reported that he plans on telling the doctor the medication is working in order to discharge, but doesn't believe he actually needs it nor that it will be helpful. Pt insists that his outpatient doctor is confused and that he needs to connect with him to get his medication sorted out. Based on [facility]'s conversation with CM, pt is exhibiting all the flags that she indicated were a sign that pt is still very symptomatic.      Discharge Plan or Goal  Pt will likely be discharged back to home with outpatient follow-up, as per agreement with CM.      Barriers to Discharge   Safe discharge plan, ongoing symptom severity (manic symptomology, active delusions), and medication evaluation/assessment.     Referral Status  None, pt has a poor hx of follow-through and CM agrees that any additional services will likely not be pursued once pt returns to the community.      Legal Status  COMMITMENT with [facility], confirmed 21 with CM  "

## 2021-07-14 NOTE — PLAN OF CARE
"Patient slept until 1100. He woke up, took his medication as scheduled. He is irritable and tense. Had a verbal outburst in the hallway, but immediately he went back to his room and was out a few minutes later apologizing to staff about yelling. Patient thinks he is being given poison and not medications, talked about how he is going to pretend like the medications are working so he can leave. \"You guys are killing me slowly and I am going to jay this place for malpractice. Speech is tangential and pressured. Patient was observed talking to himself while pacing in the hallway, appears to be responding to internal stimuli. He denies all MH symptoms. Poor insight about his MI, reports he has no reason to be in the hospital.  Patient denies pain. Staff will continue to monitor.   "

## 2021-07-14 NOTE — PLAN OF CARE
"MH Assessment     Patient was irritable the start of shift. Affect was tense but bright upon approach. Speech was pressured. He paced the halls and would stop at the nursing desk making nonsensical statements such as \" they are raping me every time they force a med on me. These medications are clogging my brain, and they are poison.\" Patient told the staff who were getting his vital signs \" I love you but you don't love me back.\" Patient was placed on elopement precautions because he stated, \" I only need 4 fingers, a badge, and a light run to get out of here. I can take on 4 staff but 5 is too much.\" Patient was observed looking out the main door windows and was redirected. Patient continued to pace the halls and was social with peers. Patient ate 100% of dinner. He was medications compliant, didn't need medication reminders. He stated, \" I think I'm going to stop smoking weed because it doesn't work with my antipsychotics.\" Patient went to bed around 2015. Vital signs were stable and denied pain. Patient voiced his need appropriately and was alert and oriented.   "

## 2021-07-14 NOTE — PLAN OF CARE
Problem: Sleep Disturbance (Manic or Hypomanic Signs/Symptoms)  Goal: Improved Sleep (Manic/Hypomanic Signs/Symptoms)  Outcome: No Change  Note: Pt continues to sleep through the night with no concerns noted.     NOC Shift Report    Pt in bed at beginning of shift, breathing quiet and unlabored. Pt slept through shift. Pt slept 6.5 hours.     No pt complaints or concerns at this time.     No PRNs given. Will continue to monitor.     Precautions: Sexual, Self-Injury

## 2021-07-15 PROCEDURE — 250N000013 HC RX MED GY IP 250 OP 250 PS 637: Performed by: PSYCHIATRY & NEUROLOGY

## 2021-07-15 PROCEDURE — 250N000011 HC RX IP 250 OP 636: Performed by: PSYCHIATRY & NEUROLOGY

## 2021-07-15 PROCEDURE — 99232 SBSQ HOSP IP/OBS MODERATE 35: CPT | Performed by: PSYCHIATRY & NEUROLOGY

## 2021-07-15 PROCEDURE — 124N000002 HC R&B MH UMMC

## 2021-07-15 RX ADMIN — DIPHENHYDRAMINE HYDROCHLORIDE 50 MG: 25 CAPSULE ORAL at 21:26

## 2021-07-15 RX ADMIN — RISPERIDONE 2.5 MG: 2 TABLET ORAL at 09:05

## 2021-07-15 RX ADMIN — OLANZAPINE 5 MG: 5 TABLET, FILM COATED ORAL at 09:04

## 2021-07-15 RX ADMIN — DIPHENHYDRAMINE HYDROCHLORIDE 50 MG: 25 CAPSULE ORAL at 12:36

## 2021-07-15 RX ADMIN — OLANZAPINE 5 MG: 5 TABLET, FILM COATED ORAL at 21:25

## 2021-07-15 RX ADMIN — OMEPRAZOLE 40 MG: 20 CAPSULE, DELAYED RELEASE ORAL at 09:04

## 2021-07-15 RX ADMIN — RISPERIDONE 3.5 MG: 3 TABLET ORAL at 21:26

## 2021-07-15 RX ADMIN — RISPERIDONE 50 MG: KIT at 17:30

## 2021-07-15 ASSESSMENT — ACTIVITIES OF DAILY LIVING (ADL)
ORAL_HYGIENE: INDEPENDENT
DRESS: INDEPENDENT
DRESS: SCRUBS (BEHAVIORAL HEALTH);INDEPENDENT
HYGIENE/GROOMING: INDEPENDENT
ORAL_HYGIENE: INDEPENDENT
HYGIENE/GROOMING: HANDWASHING;INDEPENDENT

## 2021-07-15 NOTE — PROGRESS NOTES
"[facility], [place]   Psychiatric Progress Note        Interim History:     The patient's care was discussed with the treatment team during the daily team meeting and/or staff's chart notes were reviewed.  Per staff, Sam requested to speak to us prior to receiving Risperidol Consta. RN reports patient is irritable, tenses, and pressured speech. Patient also made statements about elopement during the evening shift. Patient is due for Risperidol Consta, 50 mg, today.      Met with patient: in his room. Patient was labile and frequently tearful during our conversation. 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 Needed to remind him to maintain proper boundaries. Sam continues to be against increasing Risperdal Consta IM 50 mg and threatens to commit suicide by drinking caffeine and exercising until his \"heart explodes\". 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 However, patient states that he feels the most \"balanced\" he has in a long time. Patient states that he only had an opportunity to talk to his psychiatrist Dr. Weinstein's for a few seconds. We discussed multiple provider recommendations to increase in Risperdal Consta IM dose from 37.5 mg to 50 mg. Patient continues to be non-amenable to plan.          Medications:       risperiDONE  2.5 mg Oral QAM    Or     OLANZapine  5 mg Intramuscular QAM     risperiDONE  3.5 mg Oral At Bedtime    Or     OLANZapine  5 mg Intramuscular At Bedtime     OLANZapine  5 mg Oral BID    Or     OLANZapine  5 mg Intramuscular BID     omeprazole  40 mg Oral Daily     " risperiDONE microspheres ER  50 mg Intramuscular Q14 Days          Allergies:     Allergies   Allergen Reactions     Haloperidol Anaphylaxis          Labs:     No results found for this or any previous visit (from the past 24 hour(s)).       Psychiatric Examination:     /84   Pulse 104   Temp 97.7  F (36.5  C) (Oral)   Resp 16   Wt 74.8 kg (164 lb 14.4 oz)   SpO2 98%   BMI 23.00 kg/m    Weight is 164 lbs 14.4 oz  Body mass index is 23 kg/m .    Orthostatic Vitals       Most Recent      Sitting Orthostatic /88 07/14 1149    Sitting Orthostatic Pulse (bpm) 101 07/14 1149    Standing Orthostatic /83 07/15 0832    Standing Orthostatic Pulse (bpm) 109 07/15 0832          Appearance: awake, alert and dressed in hospital scrubs   Attitude: less cooperative   Eye Contact:  fair, at times intense  Mood: labile, frequently tearful at times  irritable  and angry   Affect:  mood congruent and intensity is dramatic at times   Speech:  clear, coherent and less pressured speech  Psychomotor Behavior:  no evidence of tardive dyskinesia, dystonia, or tics  Throught Process: still illogical and still somewhat disorganized but improving  Associations:  loosening of associations present  Thought Content:  no evidence of homicidal ideation, no visual hallucinations present, although, auditory hallucinations present. Patient endorses thoughts of suicide with plan to overdose on caffeine?!, unclear if a threat or serious feelings of suicidal ideation.   Insight:   poor  Judgement:  poor  Oriented to:  time, person, and place  Attention Span and Concentration:  poor  Recent and Remote Memory:  poor    Clinical Global Impressions  First: 7/4 6/30/2021      Most recent: 5/4 7/14/2021             Precautions:     Behavioral Orders   Procedures     Code 1 - Restrict to Unit     Elopement precautions     Routine Programming     As clinically indicated     Self Injury Precaution     Sexual precautions     Status 15      Every 15 minutes.          DIagnoses:     1.  Schizoaffective disorder, bipolar type, erum.    2.  Cannabis use disorder is highly likely.         Plan:     Patient is committed and jarvised. Jo meds are: Prolixin, Risperdal, Zyprexa and Thorazine. Got Risperdal Consta 37.5 mg. Continue Zyprexa oral and oral Risperdal. Risperdal Consta 50 mg IM due today.  Will continue to provide structure and support.     SARAVANAN Whitaker-Student     I was present with PA student who participated in the service and in the documentation of the note. I have verified the history and personally performed the physical exam and medical decision making. I agree with the assessment and plan of care as documented in the note.     Santi Hoover MD  [facility]

## 2021-07-15 NOTE — PLAN OF CARE
"NOC Shift Report     Pt in bed at beginning of shift, breathing quiet and unlabored. Pt slept through shift. He did get up once, coming to the desk and stating, \"I think the drugs are working, I feel ready to discharge\". Writer does not believe the pt nor does writer think the pt believes it as they have stated intention to \"fake it\" in order to receive discharge orders to staff prior. Pt slept 6.25 hours.      No pt complaints or concerns at this time.      No PRNs given. Will continue to monitor.    Jorje Schmitz RN  "

## 2021-07-15 NOTE — PLAN OF CARE
"Pt has FR affect; his mood is \"balanced.\" He feels he is ready for discharge. Per team, pt is still making delusional statements. He insisted on talking to Dr DYE, before getting his Risperdal injection. Dr DYE asked that this not be given until later, this afternoon; time changed to 1600. Pt asked for benadryl in early afternoon, so he could \"try and take a nap.\" He said \"no comment,\" when asked how he was doing, at the time. See PCS for shift asmnt.  "

## 2021-07-15 NOTE — PLAN OF CARE
"Assessment/Intervention/Current Symtoms and Care Coordination  ANGEL (writer) met with trx team, provided update, and reviewed pt's chart. Pt met with ANGEL this morning in the black, stating that he \"felt balanced\", reiterated he didn't want his meds increased, threatened to kill himself if his meds were increased, pleaded with CTC to not up his meds, and also admitted to lying about not having a mental illness (stating he has a hard time accepting that he does and that he doesn't like to admit it).  Pt described his plan for suicide \"as going out with a bang\", and specified drinking a bunch of \"5 hour energies\" and then go running until his \"heart explodes\". [facility] reminded pt that [facility] has no input or control of pt's medication changes/adjustments and that he needed to speak to the psychiatrist about that. Furthermore, [facility] encouraged pt to remain honest with the trx team when talking with them about his symptoms/medication, and to inform his attending about his SI related to meds. [facility] did agree to pass on pt's concerns to the trx team as well, but that pt needed to speak directly with the attending too. Pt was accepting of this, but continued to express not being in agreement about his medication and said he would talk with the attending.     Discharge Plan or Goal  Pt will likely be discharged back to home with outpatient follow-up, as per agreement with CM.      Barriers to Discharge   Safe discharge plan, ongoing symptom severity (manic symptomology, active delusions), and medication evaluation/assessment.     Referral Status  None, pt has a poor hx of follow-through and CM agrees that any additional services will likely not be pursued once pt returns to the community.      Legal Status  COMMITMENT with [facility], confirmed 6/30/21 with CM  "

## 2021-07-16 PROCEDURE — 250N000013 HC RX MED GY IP 250 OP 250 PS 637: Performed by: PSYCHIATRY & NEUROLOGY

## 2021-07-16 PROCEDURE — 99232 SBSQ HOSP IP/OBS MODERATE 35: CPT | Performed by: PSYCHIATRY & NEUROLOGY

## 2021-07-16 PROCEDURE — 124N000002 HC R&B MH UMMC

## 2021-07-16 PROCEDURE — 90853 GROUP PSYCHOTHERAPY: CPT

## 2021-07-16 RX ORDER — RISPERIDONE 3 MG/1
3 TABLET ORAL 2 TIMES DAILY
Status: DISCONTINUED | OUTPATIENT
Start: 2021-07-16 | End: 2021-07-23 | Stop reason: HOSPADM

## 2021-07-16 RX ORDER — OLANZAPINE 10 MG/2ML
5 INJECTION, POWDER, FOR SOLUTION INTRAMUSCULAR 2 TIMES DAILY
Status: DISCONTINUED | OUTPATIENT
Start: 2021-07-16 | End: 2021-07-23 | Stop reason: HOSPADM

## 2021-07-16 RX ADMIN — RISPERIDONE 2.5 MG: 2 TABLET ORAL at 10:43

## 2021-07-16 RX ADMIN — OLANZAPINE 5 MG: 5 TABLET, FILM COATED ORAL at 19:22

## 2021-07-16 RX ADMIN — DIPHENHYDRAMINE HYDROCHLORIDE 50 MG: 25 CAPSULE ORAL at 19:22

## 2021-07-16 RX ADMIN — OMEPRAZOLE 40 MG: 20 CAPSULE, DELAYED RELEASE ORAL at 10:43

## 2021-07-16 RX ADMIN — RISPERIDONE 3 MG: 3 TABLET ORAL at 19:22

## 2021-07-16 RX ADMIN — OLANZAPINE 5 MG: 5 TABLET, FILM COATED ORAL at 10:43

## 2021-07-16 ASSESSMENT — ACTIVITIES OF DAILY LIVING (ADL)
HYGIENE/GROOMING: INDEPENDENT
ORAL_HYGIENE: INDEPENDENT
LAUNDRY: WITH SUPERVISION
DRESS: INDEPENDENT

## 2021-07-16 NOTE — PLAN OF CARE
NOC Shift Report     Pt in bed at beginning of shift, breathing quiet and unlabored. Pt slept through shift. Pt slept 7 hours.      No pt complaints or concerns at this time.      No PRNs given. Will continue to monitor.    Jorje Schmitz RN

## 2021-07-16 NOTE — PROGRESS NOTES
[facility], [place]   Psychiatric Progress Note        Interim History:     The patient's care was discussed with the treatment team during the daily team meeting and/or staff's chart notes were reviewed.  Patient with a great reluctance took shot of Risperdal Consta 50 mg, however, didn't need a behavioral code. Was reported to continue to pace in hallway and talk to self. Denied presence of all mental health symptoms.     Met with patient: in his room. Patient was less labile and immediately stated that he got a shot of Consta yesterday, that nothing was going to happen to him and that he would not need to be monitored for side effects and requested to be discharged home. He had minimal eye contact and clearly had difficulties suppressing his irritability/anger towards this provider. Unclear if he could be trusted re: absence of any symptoms. He continued to deny presence of Auditory hallucinations, Visual hallucinations, Suicidal ideation, Homicidal thoughts. Stated that he lied to us about hearing voice of God and that he was not hearing voices. We told Sam that we would monitor him for presence of MI symptoms and would talk to him again on Monday morning. He passively accepted it. Later on he approached myself, PA student and another patient who was sitting at the table and crying, kneeled in front of patient on his knee, kissed her hand, said that he was sorry that ECT was not working. We asked Sam not to do that and he walked away.          Medications:       risperiDONE  3 mg Oral BID    Or     OLANZapine  5 mg Intramuscular BID     OLANZapine  5 mg Oral BID    Or     OLANZapine  5 mg Intramuscular BID     omeprazole  40 mg Oral Daily     risperiDONE microspheres ER  50 mg Intramuscular Q14 Days          Allergies:     Allergies   Allergen Reactions     Haloperidol Anaphylaxis          Labs:     No results found for this or any previous visit (from the past 24 hour(s)).        Psychiatric Examination:     /83   Pulse 106   Temp 98.9  F (37.2  C) (Oral)   Resp 16   Wt 74.8 kg (164 lb 14.4 oz)   SpO2 98%   BMI 23.00 kg/m    Weight is 164 lbs 14.4 oz  Body mass index is 23 kg/m .    Orthostatic Vitals       Most Recent      Sitting Orthostatic /83 07/16 1134    Sitting Orthostatic Pulse (bpm) 106 07/16 1134    Standing Orthostatic /83 07/15 0832    Standing Orthostatic Pulse (bpm) 109 07/15 0832         Appearance: awake, alert and dressed in hospital scrubs   Attitude: minimally cooperative   Eye Contact:  fair, at times intense  Mood: labile  Affect:  mood congruent and intensity is dramatic at times   Speech:  clear, coherent and less pressured speech  Psychomotor Behavior:  no evidence of tardive dyskinesia, dystonia, or tics  Throught Process: still illogical and still somewhat disorganized but improving  Associations:  loosening of associations present  Thought Content:  no evidence of homicidal ideation, no visual hallucinations present, although, auditory hallucinations are likely, present. Patient endorses thoughts of suicide with plan to overdose on caffeine?!, unclear if a threat or serious feelings of suicidal ideation.   Insight:   poor  Judgement:  poor  Oriented to:  time, person, and place  Attention Span and Concentration:  poor  Recent and Remote Memory:  poor    Clinical Global Impressions  First: 7/4 6/30/2021      Most recent: 5/4 7/14/2021             Precautions:     Behavioral Orders   Procedures     Code 1 - Restrict to Unit     Elopement precautions     Routine Programming     As clinically indicated     Self Injury Precaution     Sexual precautions     Status 15     Every 15 minutes.          DIagnoses:     1.  Schizoaffective disorder, bipolar type, erum.    2.  Cannabis use disorder is highly likely.         Plan:     Patient is committed and jarvised. Oj meds are: Prolixin, Risperdal, Zyprexa and Thorazine. Got Risperdal Consta 50  mg on 7/15/2021. Continue Zyprexa oral and oral Risperdal, but will change Risperdal per patient's request to 3 mg bid. Will continue to provide structure and support.     Santi Hoover MD  [facility]

## 2021-07-16 NOTE — PLAN OF CARE
Assessment/Intervention/Current Symtoms and Care Coordination  CTC (writer) met with trx team, provided update, and reviewed pt's chart. Pt met with CTC this morning in the black, stating that he felt ready to discharge and doesn't think the doctor can do anymore for him. CTC redirected pt that he needs to have that conversation with his attending. CTC validated how pt felt, acknowledging that pt has been in the hospital for a while. Pt verbalized understanding. Pt appeared calmer this afternoon, stating that he spoke to the doctor and that the trx team would reassess on Monday how pt's weekend went. Pt took this to mean that he will be discharging next Tuesday or Wednesday. CTC reiterated to pt that they would need to see what the trx team thought on Monday first.     Discharge Plan or Goal  Pt will likely be discharged back to home with outpatient follow-up, as per agreement with CM.      Barriers to Discharge   Safe discharge plan, ongoing symptom severity (manic symptomology, active delusions), and medication evaluation/assessment.     Referral Status  None, pt has a poor hx of follow-through and CM agrees that any additional services will likely not be pursued once pt returns to the community.      Legal Status  COMMITMENT with [facility], confirmed 6/30/21 with CM

## 2021-07-16 NOTE — PLAN OF CARE
Shift Summary  No new concern. Pacing in hallway and talking to self. Not happy with medication however took morning medication with no difficulties. Woke up late around 1030. Denies all mental health problem including suicidal ideations.   Denies pain. Appetite adequate. vss ( see flow sheet). No problem with B/B.   Continue to monitor pt's status Q 15 minutes and stabilize the patient's symptoms with the use of medications and therapeutic programming.

## 2021-07-16 NOTE — PLAN OF CARE
"  Problem: Behavioral Health Plan of Care  Goal: Plan of Care Review  Outcome: No Change  Flowsheets (Taken 7/15/2021 1800)  Plan of Care Reviewed With: patient  Patient Agreement with Plan of Care: agrees    Pt was very upset when offered his Risperdal Conta injection. Pt asked \"how many mg is it?\"  50 mg RN writer responded. Pt then flew off the handle, threatening to jya the doctor because he said \" I wanted 35 mg of the medication instead of 50 mg!\" A code green had to be called in order to administer the med to pt. Pt did calm down afterwards but he continued to mention that he was going to jay the doctor for overdosing him. Paced the halls restlessly. Took a shower, took his bed time meds, then retired to bed. No medication s/e reported or observed.   Plan: Status 15s; Build trust with pt. Continue to build on strengths. Encourage adequate hygiene and healthy coping.          "

## 2021-07-17 PROCEDURE — H2032 ACTIVITY THERAPY, PER 15 MIN: HCPCS

## 2021-07-17 PROCEDURE — 124N000002 HC R&B MH UMMC

## 2021-07-17 PROCEDURE — 250N000013 HC RX MED GY IP 250 OP 250 PS 637: Performed by: PSYCHIATRY & NEUROLOGY

## 2021-07-17 RX ADMIN — OLANZAPINE 5 MG: 5 TABLET, FILM COATED ORAL at 11:40

## 2021-07-17 RX ADMIN — RISPERIDONE 3 MG: 3 TABLET ORAL at 19:48

## 2021-07-17 RX ADMIN — RISPERIDONE 3 MG: 3 TABLET ORAL at 11:40

## 2021-07-17 RX ADMIN — OMEPRAZOLE 40 MG: 20 CAPSULE, DELAYED RELEASE ORAL at 11:40

## 2021-07-17 RX ADMIN — OLANZAPINE 5 MG: 5 TABLET, FILM COATED ORAL at 19:48

## 2021-07-17 ASSESSMENT — ACTIVITIES OF DAILY LIVING (ADL)
LAUNDRY: WITH SUPERVISION
HYGIENE/GROOMING: INDEPENDENT
DRESS: INDEPENDENT
ORAL_HYGIENE: INDEPENDENT

## 2021-07-17 NOTE — PLAN OF CARE
Shift Summary  Psych  No new concern. Mood is calmer than before and affect is flat. No C/o medication today. Took all morning medication with no difficulties. Still delusional and paranoid. Insight and judgement to his illness is still poor. Denies self harm ideation.   Physical  Pt alert and oriented to self and place. Denies pain. Vital sings WNL (see flow sheet for details). Slept 8.0 hours last night. Good medication compliance is noted. Seems tolerating medications well. Appetite adequate. No problem with bowel and bladder per pt.   Prn: none  Continue to monitor pt's status Q 15 minutes and stabilize the patient's symptoms with the use of medications and therapeutic programming.

## 2021-07-17 NOTE — PROGRESS NOTES
" 07/16/21 1900   Groups   Details    (Psychotherapy)   Number of patients attending the group:  5  Group Length: 1 hour -pt arrived half way through group     Group Therapy Type: Psychotherapy     Summary of Group / Topics Discussed:        The  Psychotherapy group goal is to promote insight to positive choice and change. Group processing is within a supportive and safe environment. Patients will process emotions using verbal group and expressive psychotherapy interventions including visual art/writing interventions.     Group interventions support patients by: creative self expression, self esteem,communication/social skills and supports, self efficacy/empowerment and emotional regulation     Modalities to reach these goals include: Affirmation, Expressive Arts Therapies and Mindfulness practices     Subjective -patient report of mood today- \" balanced, ready to go home\"     Objective/ Intervention- Goal of group and Therapeutic modality utilized- tempera paint sticks and tape - affirmation poster     Group Response- engaged     Patient Response- Pt  was engaged in art and group conversation. He had a flight of strange ideas he would do for a career such as \" go to nursing school for a two year degree so I can work at [employer] and be with my friends who are nurses and then I'll quit.\" or get a electrical degree from [employer] so he can help his dad out with an old house project or learn nursing so he can take care of himself if he gets\" stitches in the woods\", and other odd comments.  He was at time hyper verbal, but would self correct and apologize for interrupting.    Christ Terry, KEVINFT, ATR-BC             "

## 2021-07-17 NOTE — PLAN OF CARE
Pt attended the structured Therapeutic Recreation group, participating in a group activity. Pt participated in group discussion and leisure participation as a healthy outlet to gain self-esteem, manage behaviors, improve social skills, and decrease isolation.  Pt remained engaged throughout group activity.  Pt mood was sociable and was appropriate with interactions. Pt was active in contributing to the clues and descriptions throughout the activity. Pt was a full participant for the duration of the group.

## 2021-07-17 NOTE — PLAN OF CARE
Patient has been calm and cooperative all shift. He paced the hallway most of the shift. Patient ate dinner adequately and took his medications with no concerns. He denies all mental health symptoms. Vitals are WNL.

## 2021-07-17 NOTE — PLAN OF CARE
NOC Shift Report     Pt in bed at beginning of shift, breathing quiet and unlabored. Pt slept through most of the shift, briefly coming to the desk to request a snack. Snack given, pt returned to bed. Pt slept 6 hours.      No pt complaints or concerns at this time.      No PRNs given. Will continue to monitor.    Jorje Schmitz RN

## 2021-07-18 PROCEDURE — 250N000013 HC RX MED GY IP 250 OP 250 PS 637: Performed by: PSYCHIATRY & NEUROLOGY

## 2021-07-18 PROCEDURE — 124N000002 HC R&B MH UMMC

## 2021-07-18 RX ADMIN — OMEPRAZOLE 40 MG: 20 CAPSULE, DELAYED RELEASE ORAL at 09:33

## 2021-07-18 RX ADMIN — DIPHENHYDRAMINE HYDROCHLORIDE 50 MG: 25 CAPSULE ORAL at 19:00

## 2021-07-18 RX ADMIN — OLANZAPINE 5 MG: 5 TABLET, FILM COATED ORAL at 09:33

## 2021-07-18 RX ADMIN — RISPERIDONE 3 MG: 3 TABLET ORAL at 09:34

## 2021-07-18 RX ADMIN — OLANZAPINE 5 MG: 5 TABLET, FILM COATED ORAL at 19:00

## 2021-07-18 RX ADMIN — RISPERIDONE 3 MG: 3 TABLET ORAL at 19:00

## 2021-07-18 ASSESSMENT — ACTIVITIES OF DAILY LIVING (ADL)
DRESS: INDEPENDENT
LAUNDRY: WITH SUPERVISION
HYGIENE/GROOMING: INDEPENDENT
ORAL_HYGIENE: INDEPENDENT

## 2021-07-18 NOTE — PLAN OF CARE
Patient has been calm and cooperative this shift. He presents with a full range affect. He attended groups then had dinner. He would either pace the hallway or stay in his room. He is social with staff. Patient is medication compliant. He denies all mental health symptoms and vitals are WNL.

## 2021-07-18 NOTE — PLAN OF CARE
Shift Summary  Psych/Physical  No new concern.  Woke up late. Visible in milieu just for meals. Ate breakfast and lunch. Took morning medication with no difficulties. Did not talk much as before. Denies any suicidal ideation, homicidal ideation, Self injury behavior, hallucination or racing thought. Less manic. Mood is calm with full range affect.   Prn: none  Continue to monitor pt's status Q 15 minutes and stabilize the patient's symptoms with the use of medications and therapeutic programming.

## 2021-07-18 NOTE — PLAN OF CARE
NOC Shift Report     Pt in bed at beginning of shift, breathing quiet and unlabored. Pt slept through shift. Pt slept 6.5 hours.      No pt complaints or concerns at this time.      No PRNs given. Will continue to monitor.    Jorje Schmitz RN

## 2021-07-19 PROCEDURE — 124N000002 HC R&B MH UMMC

## 2021-07-19 PROCEDURE — 250N000013 HC RX MED GY IP 250 OP 250 PS 637: Performed by: PSYCHIATRY & NEUROLOGY

## 2021-07-19 PROCEDURE — 99232 SBSQ HOSP IP/OBS MODERATE 35: CPT | Performed by: PSYCHIATRY & NEUROLOGY

## 2021-07-19 PROCEDURE — H2032 ACTIVITY THERAPY, PER 15 MIN: HCPCS

## 2021-07-19 RX ADMIN — OLANZAPINE 5 MG: 5 TABLET, FILM COATED ORAL at 10:22

## 2021-07-19 RX ADMIN — OMEPRAZOLE 40 MG: 20 CAPSULE, DELAYED RELEASE ORAL at 10:21

## 2021-07-19 RX ADMIN — OLANZAPINE 5 MG: 5 TABLET, FILM COATED ORAL at 19:45

## 2021-07-19 RX ADMIN — RISPERIDONE 3 MG: 3 TABLET ORAL at 10:22

## 2021-07-19 RX ADMIN — RISPERIDONE 3 MG: 3 TABLET ORAL at 19:45

## 2021-07-19 RX ADMIN — DIPHENHYDRAMINE HYDROCHLORIDE 50 MG: 25 CAPSULE ORAL at 12:09

## 2021-07-19 ASSESSMENT — ACTIVITIES OF DAILY LIVING (ADL)
HYGIENE/GROOMING: INDEPENDENT
LAUNDRY: UNABLE TO COMPLETE
DRESS: INDEPENDENT
ORAL_HYGIENE: INDEPENDENT

## 2021-07-19 NOTE — PLAN OF CARE
"Music Therapy Group note    Total time in session: 25 minutes    Number of patients in group: 3    Scope of service: psychodynamic     Patient progress: some slow improvement    Intervention: Music Preference Assessment-ongoing    Goal of group: to assess and treat through music     Patient response/reaction to treatment intervention(s): Sam had a bright affect today, greeting MT as she came on the unit.  Engaged in the music played in group, and requested the song \"Unchained Shirley\".  While he was listening, he laid back in his chair and spread his appendages out, like a star fish.  He appeared deeply relaxed.  When the song was over, he reported he was crying and thanked MT for group.  He was seen eating a snack shortly after.     Monica Chamberlain, MT-BC  Board-Certified Music Therapist             "

## 2021-07-19 NOTE — PLAN OF CARE
"NOC Shift Report     Pt in bed at beginning of shift, breathing quiet and unlabored. Pt slept through shift. He came to the desk at 0500 for a snack and stated \"This is our last night, I'm aiming for discharge tomorrow, tell the doc there's nothing more he can do for me\". Snack given and pt returned to bed. Pt slept 5.5 hours.      No pt complaints or concerns at this time.      No PRNs given. Will continue to monitor.    Jorje Schmitz RN    "

## 2021-07-19 NOTE — PLAN OF CARE
"Problem: Suicidal Behavior  Goal: Suicidal Behavior is Absent or Managed  Outcome: No Change     Problem: Mood Impairment (Manic or Hypomanic Signs/Symptoms)  Goal: Improved Mood Symptoms (Manic/Hypomanic Signs/Symptoms)  Outcome: Improving     At initial approach, patient is pleasant, guarded, and dismissive.  He denies anxiety, depression, SI, HI, SIB, hallucinations, and pain.   At time of medication administration, writer was reviewing what medications patient takes and he said, \"I think you mean my poison and my omeprazole,\" referring to risperidone and olanzapine as poison.  However, patient is still medication compliant with PO medication. Patient is eager for discharge and believes he is ready to discharge today.  He says, \"I've been on this medication regimen for a week, I'm ready to go.\" At [id number] patient approached writer requesting PRN diphenhydramine saying, \"I'm angry as fuck I need to calm down.\"  Patient continued to express frustration with hospitalization saying there is no one here to advocate for patient.  PRN diphenhydramine 50 mg was administered at this time.  Patient remains safe on unit.  Will continue to monitor.  Kira Ortez RN   "

## 2021-07-19 NOTE — PROGRESS NOTES
"[facility], [place]   Psychiatric Progress Note        Interim History:     The patient's care was discussed with the treatment team during the daily team meeting and/or staff's chart notes were reviewed.  Patient appeared to be calmer and was reported to isolate self over weekend. Denied presence of all mental health symptoms, but compliantly took meds. Still continued to call neuroleptics: \"my poison\".     Met with patient: in the patients' lounge. He was sitting and eating his breakfast, but agreed to talk to me and was pretty pleasant during our visit. Stated that he had been on his meds over week and would take meds in community and that he felt much, much better and would like to be discharged back to community, preferably, today. Said that he liked Risperdal 3 mg bid regiment much better than previous one. He could not explain what changed in his attitude about presence of mental illness and his attitude about taking neuroleptics and made clear impression of minimizing his symptoms in order to speed up his discharge. I discussed with him that we would need to see steady improvement for at least few more days prior to discharging him home and would need to contact his outpatient team prior to discharge. He was minimally receptive and insisted that he needed to be discharged today or, if not today, then, tomorrow or on Wednesday.          Medications:       risperiDONE  3 mg Oral BID    Or     OLANZapine  5 mg Intramuscular BID     OLANZapine  5 mg Oral BID    Or     OLANZapine  5 mg Intramuscular BID     omeprazole  40 mg Oral Daily     risperiDONE microspheres ER  50 mg Intramuscular Q14 Days          Allergies:     Allergies   Allergen Reactions     Haloperidol Anaphylaxis          Labs:     No results found for this or any previous visit (from the past 24 hour(s)).       Psychiatric Examination:     BP (!) 127/90 (BP Location: Left arm)   Pulse 105   Temp 98.1  F (36.7  C) " (Oral)   Resp 17   Wt 75.4 kg (166 lb 4.8 oz)   SpO2 98%   BMI 23.19 kg/m    Weight is 166 lbs 4.8 oz  Body mass index is 23.19 kg/m .    Orthostatic Vitals       Most Recent      Standing Orthostatic /85 07/18 0937    Standing Orthostatic Pulse (bpm) 105 07/18 0937         Appearance: awake, alert and dressed in hospital scrubs, unshaven  Attitude: slightly more cooperative   Eye Contact:  fair, at times intense  Mood: still labile  Affect:  mood congruent and intensity is dramatic at times   Speech:  clear, coherent and less pressured speech  Psychomotor Behavior:  no evidence of tardive dyskinesia, dystonia, or tics  Throught Process: still illogical and still somewhat disorganized but improving  Associations:  loosening of associations present, but less so  Thought Content:  no evidence of homicidal ideation, no visual hallucinations present, although, auditory hallucinations are likely, present. He is reportedly, religiously preoccupied.  Insight:   poor  Judgement:  poor  Oriented to:  time, person, and place  Attention Span and Concentration:  poor  Recent and Remote Memory:  poor    Clinical Global Impressions  First: 7/4 6/30/2021      Most recent: 5/4 7/14/2021             Precautions:     Behavioral Orders   Procedures     Code 1 - Restrict to Unit     Elopement precautions     Routine Programming     As clinically indicated     Self Injury Precaution     Sexual precautions     Status 15     Every 15 minutes.          DIagnoses:     1.  Schizoaffective disorder, bipolar type, erum.    2.  Cannabis use disorder is highly likely.         Plan:     Patient is committed and jarvised. Jo meds are: Prolixin, Risperdal, Zyprexa and Thorazine. Got Risperdal Consta 50 mg on 7/15/2021. Continue Zyprexa oral and oral Risperdal. Will continue to provide structure and support. Will talk to outpatient team in a few days about evaluating patient's readiness for discharge.    Santi Hoover,  MD  [facility]

## 2021-07-19 NOTE — PLAN OF CARE
Assessment/Intervention/Current Symtoms and Care Coordination  CTC (writer) met with trx team, provided update, and reviewed pt's chart. CTC met with pt this afternoon to check in and address any questions/concern pt had. Pt stated that he had no questions or concerns to address, but expressed feeling more ready for discharge. Pt did not present with any delusional statements related to religiosity not his  grandfather. However, pt did mention to his RN that he still feels that his meds are poison when she passed them to him, but pt was med compliant. Pt was pleasant and cooperative, appearing calmer and had minimal pressured speech. Furthermore, pt was much more organized today and was seen pacing less overall. Pt also attended some groups. Based on CM previous report pt may be returning to baseline, but pt is also reported to be able to cover his symptoms well when not fully stabilized. Pt declined being referred to any additional outpatient services or resources when offered.      Discharge Plan or Goal  Pt will likely be discharged back to home with outpatient follow-up, as per agreement with CM.      Barriers to Discharge   Safe discharge plan, ongoing symptom severity (manic symptomology, active delusions), and medication evaluation/assessment.     Referral Status  None, pt has a poor hx of follow-through and CM agrees that any additional services will likely not be pursued once pt returns to the community.      Legal Status  COMMITMENT with [facility], confirmed 21 with CM

## 2021-07-19 NOTE — PLAN OF CARE
"Patient had a really calm shift. He was in and out of his room. When in the milieu he would pace the hallway. He ate dinner and is medication compliant. Patient requested his medication at 1900 saying \"I am going to crash early so I can discharge tomorrow.\" He denies all mental health symptoms. Vitals are WNL. He was in bed by 2000.   "

## 2021-07-20 PROCEDURE — 250N000013 HC RX MED GY IP 250 OP 250 PS 637: Performed by: PSYCHIATRY & NEUROLOGY

## 2021-07-20 PROCEDURE — 124N000002 HC R&B MH UMMC

## 2021-07-20 PROCEDURE — 99232 SBSQ HOSP IP/OBS MODERATE 35: CPT | Performed by: PSYCHIATRY & NEUROLOGY

## 2021-07-20 RX ADMIN — RISPERIDONE 3 MG: 3 TABLET ORAL at 08:55

## 2021-07-20 RX ADMIN — OLANZAPINE 5 MG: 5 TABLET, FILM COATED ORAL at 08:55

## 2021-07-20 RX ADMIN — RISPERIDONE 3 MG: 3 TABLET ORAL at 19:01

## 2021-07-20 RX ADMIN — OMEPRAZOLE 40 MG: 20 CAPSULE, DELAYED RELEASE ORAL at 08:55

## 2021-07-20 RX ADMIN — OLANZAPINE 5 MG: 5 TABLET, FILM COATED ORAL at 19:02

## 2021-07-20 ASSESSMENT — ACTIVITIES OF DAILY LIVING (ADL)
LAUNDRY: WITH SUPERVISION
DRESS: INDEPENDENT
HYGIENE/GROOMING: INDEPENDENT
DRESS: SCRUBS (BEHAVIORAL HEALTH)
ORAL_HYGIENE: INDEPENDENT
ORAL_HYGIENE: INDEPENDENT
HYGIENE/GROOMING: INDEPENDENT
LAUNDRY: UNABLE TO COMPLETE

## 2021-07-20 NOTE — PROGRESS NOTES
"[facility], [place]   Psychiatric Progress Note        Interim History:     The patient's care was discussed with the treatment team during the daily team meeting and/or staff's chart notes were reviewed.  Patient appeared to be calmer and was reported to isolate self oat times and at times to pace down the corridor and being focused on discharge. Denied presence of all mental health symptoms, but compliantly took meds.      Met with patient: in the conference room in presence of 2 nursing students. He in fact, met me when I entered this unit and immediately told me that he was ready for discharge: \"doc, can you discharge me home today\"? I told him that prior to discharge we would have to talk to his outpatient team and, most likely, they would like to come in person and evaluate if he is stable enough to be discharged. I emphasized an importance of taking meds and reminded Sam about his history of very poor compliance with meds in community. I also advised him that excessive demands to be discharged can, in fact, be counterproductive and delay his discharge. He, surprisingly, was at list partially receptive and agreed to stay at this hospital and wait for meeting with CM. He had no further questions or concerns. He denied having any med side effects.           Medications:       risperiDONE  3 mg Oral BID    Or     OLANZapine  5 mg Intramuscular BID     OLANZapine  5 mg Oral BID    Or     OLANZapine  5 mg Intramuscular BID     omeprazole  40 mg Oral Daily     risperiDONE microspheres ER  50 mg Intramuscular Q14 Days          Allergies:     Allergies   Allergen Reactions     Haloperidol Anaphylaxis          Labs:     No results found for this or any previous visit (from the past 24 hour(s)).          Psychiatric Examination:     /78 (BP Location: Right arm)   Pulse 87   Temp 98.1  F (36.7  C) (Oral)   Resp 16   Wt 75.3 kg (166 lb)   SpO2 98%   BMI 23.15 kg/m    Weight is " 166 lbs 0 oz  Body mass index is 23.15 kg/m .    Orthostatic Vitals       Most Recent      Sitting Orthostatic /79 07/20 0800    Sitting Orthostatic Pulse (bpm) 83 07/20 0800    Standing Orthostatic BP 96/66 07/20 0800    Standing Orthostatic Pulse (bpm) 118 07/20 0800         Appearance: awake, alert and dressed in hospital scrubs, unshaven  Attitude: slightly more cooperative   Eye Contact:  fair, at times intense  Mood: slightly less labile  Affect:  mood congruent and intensity is dramatic at times   Speech:  clear, coherent and less pressured speech  Psychomotor Behavior:  no evidence of tardive dyskinesia, dystonia, or tics  Throught Process: still illogical and still somewhat disorganized but improving  Associations:  loosening of associations present, but less so  Thought Content:  no evidence of homicidal ideation, no visual hallucinations present, although, auditory hallucinations are likely, present. He is reportedly, religiously preoccupied, although, didn't talk about Yazidism issues during today visit.  Insight:   poor  Judgement:  poor  Oriented to:  time, person, and place  Attention Span and Concentration:  poor  Recent and Remote Memory:  poor    Clinical Global Impressions  First: 7/4 6/30/2021      Most recent: 5/4 7/14/2021             Precautions:     Behavioral Orders   Procedures     Code 1 - Restrict to Unit     Elopement precautions     Routine Programming     As clinically indicated     Self Injury Precaution     Sexual precautions     Status 15     Every 15 minutes.          DIagnoses:     1.  Schizoaffective disorder, bipolar type, erum.    2.  Cannabis use disorder is highly likely.         Plan:     Patient is committed and jarvised. Jo meds are: Prolixin, Risperdal, Zyprexa and Thorazine. Got Risperdal Consta 50 mg on 7/15/2021. Continue Zyprexa oral and oral Risperdal. No medication changes today. Will continue to provide structure and support. Will talk to outpatient  team about evaluating patient's readiness for discharge.    Santi Hoover MD  [facility] Psychiatry

## 2021-07-20 NOTE — PLAN OF CARE
Assessment/Intervention/Current Symtoms and Care Coordination  ANGEL (writer) met with trx team, provided update, and reviewed pt's chart. CTC completed weekly plan of care team note. CTC called pt's CM, Yeny [phone], leaving a VM providing a brief update and requesting a callback about CM coming to see pt in-person at the hospital this week. CTC explained that the trx team would like CM impression of pt's presentation and collateral about if pt presents as his baseline. CTC was able to connect with CM to discuss her visiting this week, and CM said she could come tomorrow (7/21/21) at 11;00am. CTC and CM will meet afterward to discus her impressions and further discharge planing. CM did confirm that she spoke with pt earlier today and that pt continues to express feeling overmedicated, had pressured speech, and mentioned that he has been acting differently to discharge. CTC met with pt to inform him the his CM would come tomorrow and that CTC would meet with her afterward to discuss trx and discharge planning. Pt was still focused on discharging, but was pleasant and focused during the conversation. Pt declined having any further questions or concerns after talking with CTC.     Discharge Plan or Goal  Pt will likely be discharged back to home with outpatient follow-up, as per agreement with CM.      Barriers to Discharge   Safe discharge plan, ongoing symptom severity (manic symptomology, active delusions), and medication evaluation/assessment.     Referral Status  None, pt has a poor hx of follow-through and CM agrees that any additional services will likely not be pursued once pt returns to the community.      Legal Status  COMMITMENT with [facility], confirmed 6/30/21 with CM

## 2021-07-20 NOTE — PLAN OF CARE
Shift Summary  Psych/Physical  No new concern.   Took night medication around 1900 per request. Went to bed early. Less manic. Denies all mental health. Denies pain.  Prn: none  Continue to monitor pt's status Q 15 minutes and stabilize the patient's symptoms with the use of medications and therapeutic programming.

## 2021-07-20 NOTE — PLAN OF CARE
"NOC Shift Report    Pt in bed at beginning of shift, breathing quiet and unlabored. Pt slept through shift. Pt slept 7 hours.     Pt awake around 0400 to get a snack. Pt stating \"that was a deep sleep, i'm so fucking high right now, my eye lids are really heavy\". Pt states feeling better following snack. No other pt complaints or concerns at this time.     No PRNs given. Will continue to monitor.     Precautions: elopement, sexual, SIB    "

## 2021-07-20 NOTE — PLAN OF CARE
"  Problem: Behavior Regulation Impairment (Manic or Hypomanic Signs/Symptoms)  Goal: Improved Impulse Control (Manic/Hypomanic Signs/Symptoms)  Outcome: Improving     Problem: Mood Impairment (Manic or Hypomanic Signs/Symptoms)  Goal: Improved Mood Symptoms (Manic/Hypomanic Signs/Symptoms)  Outcome: Improving  Flowsheets (Taken 7/20/2021 0876)  Mutually Determined Action Steps (Improved Mood Symptoms):   engages in physical activity   identifies personal treatment goal     RN Assessment:  SI/Self harm:  pt denies  Aggression/agitation/HI:  none reported or observed  AVH:  none reported or observed  Sleep: 7 hours documented. No reported concerns regarding sleep  PRN Med: No PRNs administered this shift  Medication AE: none reported or observed  Physical Complaints/Issues: none  I & O: eating and drinking well  ADLs: independent  Vitals:  stable and WNL  COVID 19 Assessment: negative, no symptoms    Milieu Participation: minimal, presents for meals, otherwise keeps to self  Behavior: paces the hallway, otherwise spends most of the day in his room. Pt shows rapid, pressured speech while talking to writer, took all scheduled medications without issue. Pt  continues to request ASAP discharge. Pt states \"please tell the doctor that there's nothing more he can do for me and I need to leave now\". Pt does appear to be showing fewer signs of erum than on previous shifts. No aggression or behavioral outbursts this shift.     No other concerns at this time. Nursing will continue to monitor and assess.   "

## 2021-07-20 NOTE — PLAN OF CARE
BEHAVIORAL TEAM DISCUSSION    Participants: Dr. Sneha BEARD, Vilma PIERCE RN, Stuart Alvarez [facility]  Progress: improving, pt overall appears to be less symptomatic. Pt continues to have pressured speech and paces excessively, but has been making fewer delusional statements and has been less impulsive. Pt continues to endorse that his meds are poison, but is med compliant in the hospital. Trx team believe that pt has improved, but are unable to determine if pt is at baseline, and are concerned that pt will not remain med compliant in the community resulting in rapid readmittance. Recent medication changes are likely a factor in pt's improvement.  Anticipated length of stay: 7 additional days.  Continued Stay Criteria/Rationale: Ongoing medication evaluation/assessment, manic symptoms, and outpatient follow-up coordination efforts.   Medical/Physical: None reported in team  Precautions:   Behavioral Orders   Procedures    Code 1 - Restrict to Unit    Elopement precautions    Routine Programming     As clinically indicated    Self Injury Precaution    Sexual precautions    Status 15     Every 15 minutes.     Plan: Trx team has requested that pt's CM come see pt this week to help clarify if pt is at baseline and ready for discharge. Once pt is determined to be at baseline and continues to show an ongoing or consistent improvement of manic symptoms for a few days in a row, pt will be discharged back home. Attending will connect with pt's outpatient psychiatry provider to discuss meds prior to discharge as well.   Rationale for change in precautions or plan: Ongoing stabilization and aftercare support efforts.

## 2021-07-21 PROCEDURE — 250N000013 HC RX MED GY IP 250 OP 250 PS 637: Performed by: PSYCHIATRY & NEUROLOGY

## 2021-07-21 PROCEDURE — 90853 GROUP PSYCHOTHERAPY: CPT

## 2021-07-21 PROCEDURE — 99232 SBSQ HOSP IP/OBS MODERATE 35: CPT | Performed by: PSYCHIATRY & NEUROLOGY

## 2021-07-21 PROCEDURE — 124N000002 HC R&B MH UMMC

## 2021-07-21 RX ADMIN — OLANZAPINE 5 MG: 5 TABLET, FILM COATED ORAL at 08:06

## 2021-07-21 RX ADMIN — RISPERIDONE 3 MG: 3 TABLET ORAL at 08:06

## 2021-07-21 RX ADMIN — OMEPRAZOLE 40 MG: 20 CAPSULE, DELAYED RELEASE ORAL at 08:06

## 2021-07-21 RX ADMIN — OLANZAPINE 5 MG: 5 TABLET, FILM COATED ORAL at 19:24

## 2021-07-21 RX ADMIN — RISPERIDONE 3 MG: 3 TABLET ORAL at 19:24

## 2021-07-21 ASSESSMENT — ACTIVITIES OF DAILY LIVING (ADL)
DRESS: INDEPENDENT
HYGIENE/GROOMING: INDEPENDENT
ORAL_HYGIENE: INDEPENDENT
LAUNDRY: WITH SUPERVISION

## 2021-07-21 NOTE — PLAN OF CARE
Patient appeared asleep for 7hrs during the night shift with no behavioral or medical concerns. Patient remains on 15min checks.

## 2021-07-21 NOTE — PROGRESS NOTES
"[facility], [place]   Psychiatric Progress Note        Interim History:     The patient's care was discussed with the treatment team during the daily team meeting and/or staff's chart notes were reviewed.  Patient appeared to be slightly calmer and was reported to isolate self at times and at times to pace down the corridor and being focused on discharge. Denied presence of all mental health symptoms, but compliantly took meds. Still called neuroleptics a poison. Met with his CM who had been with Sam for a long time and known him well and, then, discussed Sam's progress with [facility]. Per CM patient is close to his baseline. See [facility]'s note below:     \"ANGEL (writer) met with trx team, provided update, and reviewed pt's chart. CM, Yeny, met with pt this afternoon and then met with ANGEL. CM reported that pt still has delusions about his medication being poisonous, and identified some religiosity and talk about his  grandfather. However, CM also confirmed that pt has baseline delusions about medication and that he did not appear to be responding internally while they were talking. CM also reported that pt declined additional follow-up or community services when offered by her. Based on their conversation, pt is likely at baseline and CM support pt discharge when trx team is ready. [facility] called pt's father, Edward [phone], to provide update on pt's case and let him know that pt would likely be discharging this week. [facility] reassured edward that they would review the PD, emphasizing that pt needed to remain med compliant, and that they would explain to pt that not adhering to the PD could result in rehospitalization. [facility] also passed on that the attending agreed to call pt's community psychiatrist to review medications again prior to discharge. Edward did request a callback from the RN about some medication questions, but that he wouldn't be available until late afternoon. [facility] passed this onto " "evening RN staff. CTC met with pt this afternoon to inform them that trx team would be discussing discharge this week and would follow-up with him once CTC knew more.\"     Met with patient: in his room. He was quite agitated, presented me with note stating that he needed no longer stay at this facility and should be discharged back to community, talking about self being able to hear God's voice. He stated during our visit that he would give me a choice to discharge Sam from this hospital voluntarily or face consequences of legal suit. Sam had pretty poor boundaries repeatedly calling me: birgit Hancock and jaqui and seemed to be unable to be redirected, even, though, he initially apologized for doing so. I reminded Sam that he was hospitalized at this hospital under court order and all meds he was calling: \"a poison\" were authorized by commitment court. Sam didn't listen and continued to threaten with legal suit. I informed him that in order to discharge him home we would talk to his CM and if CM feels comfortable with Sam's discharge we would request provisional discharge. This is where we left.            Medications:       risperiDONE  3 mg Oral BID    Or     OLANZapine  5 mg Intramuscular BID     OLANZapine  5 mg Oral BID    Or     OLANZapine  5 mg Intramuscular BID     omeprazole  40 mg Oral Daily     risperiDONE microspheres ER  50 mg Intramuscular Q14 Days          Allergies:     Allergies   Allergen Reactions     Haloperidol Anaphylaxis          Labs:     No results found for this or any previous visit (from the past 24 hour(s)).          Psychiatric Examination:     /83   Pulse 88   Temp (!) 95.3  F (35.2  C) (Tympanic)   Resp 16   Wt 75.3 kg (166 lb)   SpO2 98%   BMI 23.15 kg/m    Weight is 166 lbs 0 oz  Body mass index is 23.15 kg/m .    Orthostatic Vitals       Most Recent      Sitting Orthostatic /88 07/21 0800    Sitting Orthostatic Pulse (bpm) 61 07/21 0800    Standing " Orthostatic /72 07/21 0800    Standing Orthostatic Pulse (bpm) 97 07/21 0800         Appearance: awake, alert and dressed in hospital scrubs, unshaven  Attitude: irritable and minimally cooperative  Eye Contact:  fair, at times intense  Mood: labile  Affect:  mood congruent and intensity is dramatic at times   Speech:  clear, coherent and less pressured speech  Psychomotor Behavior:  no evidence of tardive dyskinesia, dystonia, or tics  Throught Process: still illogical and still somewhat disorganized   Associations:  loosening of associations present, but less so  Thought Content:  no evidence of homicidal ideation, no visual hallucinations present, although, auditory hallucinations are likely, present. He is reportedly, religiously preoccupied, continues to voice Gnosticist ideas, See discussion above.  Insight:   poor  Judgement:  poor  Oriented to:  time, person, and place  Attention Span and Concentration:  poor  Recent and Remote Memory:  poor    Clinical Global Impressions  First: 7/4 6/30/2021      Most recent: 5/4 7/21/2021             Precautions:     Behavioral Orders   Procedures     Code 1 - Restrict to Unit     Elopement precautions     Routine Programming     As clinically indicated     Self Injury Precaution     Sexual precautions     Status 15     Every 15 minutes.          DIagnoses:     1.  Schizoaffective disorder, bipolar type, erum.    2.  Cannabis use disorder is highly likely.         Plan:     Patient is committed and jarvised. Jo meds are: Prolixin, Risperdal, Zyprexa and Thorazine. Got Risperdal Consta 50 mg on 7/15/2021. Continue Zyprexa oral and oral Risperdal. No medication changes today. Will continue to provide structure and support. It appears that patient is, unfortunately, is at or close to his baseline and per CM can be discharged back to community. Will arrange for PD and will plan for discharge tomorrow or day after tomorrow.     Santi Hoover MD  [facility]  Services Psychiatry

## 2021-07-21 NOTE — PLAN OF CARE
Patient has been calm this shift. He paces the black when in the lounge and presents with a blunted affect. He did not attend groups. He ate dinner adequately and is medication compliant. He denies all mental health symptoms. Vitals are WNL. Patient stated that he expects to be discharged within the next two days.

## 2021-07-21 NOTE — PLAN OF CARE
Assessment/Intervention/Current Symtoms and Care Coordination  CTC (writer) met with trx team, provided update, and reviewed pt's chart. CM, Yeny, met with pt this afternoon and then met with CTC. CM reported that pt still has delusions about his medication being poisonous, and identified some religiosity and talk about his  grandfather. However, CM also confirmed that pt has baseline delusions about medication and that he did not appear to be responding internally while they were talking. CM also reported that pt declined additional follow-up or community services when offered by her. Based on their conversation, pt is likely at baseline and CM support pt discharge when trx team is ready. [facility] called pt's father, Edward [phone], to provide update on pt's case and let him know that pt would likely be discharging this week. [facility] reassured edward that they would review the PD, emphasizing that pt needed to remain med compliant, and that they would explain to pt that not adhering to the PD could result in rehospitalization. [facility] also passed on that the attending agreed to call pt's community psychiatrist to review medications again prior to discharge. Edward did request a callback from the RN about some medication questions, but that he wouldn't be available until late afternoon. [facility] passed this onto evening RN staff. CTC met with pt this afternoon to inform them that trx team would be discussing discharge this week and would follow-up with him once CTC knew more.      Discharge Plan or Goal  Pt will likely be discharged back to home with outpatient follow-up, as per agreement with CM.      Barriers to Discharge   Safe discharge plan, ongoing symptom severity (manic symptomology, active delusions), and medication evaluation/assessment.     Referral Status  None, pt has a poor hx of follow-through and CM agrees that any additional services will likely not be pursued once pt returns to the community.      Legal  Status  COMMITMENT with [facility], confirmed 6/30/21 with CM

## 2021-07-21 NOTE — PLAN OF CARE
Shift Summary  Psych  Pt came to nursing station after he ate and took morning medication with no difficulties. Pt said after discharge he is not going to take them. Pt believes that he does not need those medication and all are poision. Pt seems less manic. No pacing in hallway or being hyperverball noted today. However still seems paranoid. Did not participate in any group activity.   Prn: none  Physical  Pt alert and oriented x 3. Denies pain. Vital sings WNL (see flow sheet for details). Slept 7.0 hours last night. Seems tolerating medications well. Pt said all these medication have side effect and he is going to loose his fingers and toes. Appetite adequate. No problem with bowel and bladder per pt.   Prn: none  Continue to monitor pt's status Q 15 minutes and stabilize the patient's symptoms with the use of medications and therapeutic programming.

## 2021-07-22 PROCEDURE — 124N000002 HC R&B MH UMMC

## 2021-07-22 PROCEDURE — H2032 ACTIVITY THERAPY, PER 15 MIN: HCPCS

## 2021-07-22 PROCEDURE — 99232 SBSQ HOSP IP/OBS MODERATE 35: CPT | Performed by: PSYCHIATRY & NEUROLOGY

## 2021-07-22 PROCEDURE — 250N000013 HC RX MED GY IP 250 OP 250 PS 637: Performed by: PSYCHIATRY & NEUROLOGY

## 2021-07-22 RX ADMIN — OLANZAPINE 5 MG: 5 TABLET, FILM COATED ORAL at 19:20

## 2021-07-22 RX ADMIN — RISPERIDONE 3 MG: 3 TABLET ORAL at 10:34

## 2021-07-22 RX ADMIN — OLANZAPINE 5 MG: 5 TABLET, FILM COATED ORAL at 10:34

## 2021-07-22 RX ADMIN — OMEPRAZOLE 40 MG: 20 CAPSULE, DELAYED RELEASE ORAL at 10:33

## 2021-07-22 RX ADMIN — RISPERIDONE 3 MG: 3 TABLET ORAL at 19:20

## 2021-07-22 ASSESSMENT — ACTIVITIES OF DAILY LIVING (ADL)
LAUNDRY: WITH SUPERVISION
ORAL_HYGIENE: INDEPENDENT
HYGIENE/GROOMING: INDEPENDENT
DRESS: INDEPENDENT

## 2021-07-22 NOTE — PROGRESS NOTES
"[facility], [place]   Psychiatric Progress Note        Interim History:     The patient's care was discussed with the treatment team during the daily team meeting and/or staff's chart notes were reviewed.  Patient appeared to be slightly calmer and was reported to isolate self at times and at times to pace down the corridor and being focused on discharge. Denied presence of all mental health symptoms, but compliantly took meds. Still called neuroleptics a poison. Contracted for safety.      Met with patient: in the patients' lounge. He was less agitated today, but continued to repeat that he would like to leave hospital: \"there is nothing else you can do for me here\". He was aware that his CM recommended that he is discharged today or tomorrow and was pushing for discharge today. I told him that prior to his discharge we would need to have PD in place and I would like to talk to his dad and Dr. Crooks. After visit with patient I called [facility] where Dr. Crooks works and was told that Dr. Crooks is on vacations. I left a message for him informing that Sam will be discharged tomorrow and about high probability with poor compliance with meds after discharge. I also talked to the patient's dad, Edward. We discussed with Edward Vargas's progress. Dad agreed that poor compliance was likely after discharge, promised to do whatever he and his wife could do to encourage patient to be more compliant with his meds. He agreed to come and pick Sam up tomorrow afternoon.         Medications:       risperiDONE  3 mg Oral BID    Or     OLANZapine  5 mg Intramuscular BID     OLANZapine  5 mg Oral BID    Or     OLANZapine  5 mg Intramuscular BID     omeprazole  40 mg Oral Daily     risperiDONE microspheres ER  50 mg Intramuscular Q14 Days          Allergies:     Allergies   Allergen Reactions     Haloperidol Anaphylaxis          Labs:     No results found for this or any previous visit " (from the past 24 hour(s)).          Psychiatric Examination:     /86   Pulse 86   Temp 98.5  F (36.9  C) (Oral)   Resp 16   Wt 75.3 kg (166 lb)   SpO2 98%   BMI 23.15 kg/m    Weight is 166 lbs 0 oz  Body mass index is 23.15 kg/m .    Orthostatic Vitals       Most Recent      Sitting Orthostatic /86 07/22 1027    Sitting Orthostatic Pulse (bpm) 86 07/22 1027    Standing Orthostatic /74 07/22 1027    Standing Orthostatic Pulse (bpm) 123 07/22 1027         Appearance: awake, alert and dressed in hospital scrubs, unshaven  Attitude: less irritable    Eye Contact:  fair, at times intense  Mood: labile  Affect:  mood congruent and intensity is dramatic at times   Speech:  clear, coherent and less pressured speech  Psychomotor Behavior:  no evidence of tardive dyskinesia, dystonia, or tics  Throught Process: still somewhat illogical and disorganized   Associations:  loosening of associations present, but less so  Thought Content:  no evidence of homicidal ideation, no visual hallucinations present, although, auditory hallucinations are likely, present. He is reportedly, religiously preoccupied, continues to voice Nondenominational ideas, See discussion above.  Insight:   poor  Judgement:  poor  Oriented to:  time, person, and place  Attention Span and Concentration:  poor  Recent and Remote Memory:  poor    Clinical Global Impressions  First: 7/4 6/30/2021      Most recent: 5/4 7/21/2021             Precautions:     Behavioral Orders   Procedures     Code 1 - Restrict to Unit     Elopement precautions     Routine Programming     As clinically indicated     Self Injury Precaution     Sexual precautions     Status 15     Every 15 minutes.          DIagnoses:     1.  Schizoaffective disorder, bipolar type, erum.    2.  Cannabis use disorder is highly likely.         Plan:     Patient is committed and jarvised. Jo meds are: Prolixin, Risperdal, Zyprexa and Thorazine. Got Risperdal Consta 50 mg on  7/15/2021. Continue Zyprexa oral and oral Risperdal. No medication changes today. Will continue to provide structure and support. It appears that patient is, unfortunately, is at or close to his baseline and per CM can be discharged back to community tomorrow. This was agreed with CM, dad, See discussion above.     Santi Hoover MD  [facility] Psychiatry

## 2021-07-22 NOTE — PROGRESS NOTES
"Number of patients attending the group: 3  Group Length:  0.5 Hour     Group Therapy      Summary of Group / Topics Discussed:  The psychotherapy group goal is to promote insight to positive choice and change. Group processing is within a supportive and safe environment. Patients processed emotions using verbal group and expressive psychotherapy interventions.  This group focused on individual distress and treatment planning. Patients took turns to identify their current stressors and brainstormed on ways to use their individual treatment plans and discharge plans to initiate coping strategies with the identified stressors. Group members offered feedback to each participant.     Assessment:   This pt presented with a congruent affect and reported his feelings and needs fluently during this session. Pt was engaged in group. Half-way into group, he left \"to use the rest room.\"       Patient Response: Pt provided and received feedback from group members.  "

## 2021-07-22 NOTE — PLAN OF CARE
"Music Therapy Group note    Total time in session: 45 minutes    Number of patients in group: 3    Scope of service: psychodynamic     Patient progress: continues to display elevated mood, though slightly less impulsive     Intervention: Music Preferences Assessment    Goal of group: to assess and treat through music    Patient response/reaction to treatment intervention(s): Sam was engaged and wanting to be engaging in group today.  His mood continues to be \"up\", though he is not as quick to gather attention today.  He reported excitement that he is \"discharging tomorrow!\" pending the doctor and his dad being able to pick him up, he explained.  Writer was surprised to hear this, but congratulated him.      Monica Chamberlain, MT-BC  Board-Certified Music Therapist           "

## 2021-07-22 NOTE — PLAN OF CARE
Shift Summary  No new concern. Pt seems to be at his baseline. Discharging home tomorrow. Declined Covid vaccine. No self harm behavior noted. Continue to monitor.

## 2021-07-22 NOTE — PLAN OF CARE
Assessment/Intervention/Current Symtoms and Care Coordination  CTC (writer) met with trx team, provided update, and reviewed pt's chart. CTC coordinated follow-up appointments, including pt's injectable appointment next week. AVS was updated. Trx team agrees that pt should discharge tomorrow if family is able to provide a ride. Evening RN was unable to call pt's father, so attending was agreeable to calling pt father today. Attending also agreed to reach out to pt's outpatient provider today to discuss discharge meds as discussed previously. CTC met with pt to explain that trx team was looking to discharge tomorrow and if his family was unable to provide transportation, that pt would likely be discharged Monday. Pt expressed being fine with this and was happy to hear that he may be discharging tomorrow or otherwise soon.     Discharge Plan or Goal  Pt will be discharged back to home with outpatient follow-up for psychiatry, as per agreement with CM.      Barriers to Discharge   Safe discharge plan, ongoing symptom observation, and medication evaluation/assessment.     Referral Status  None, pt has a poor hx of follow-through and CM agrees that any additional services will likely not be pursued once pt returns to the community.      Legal Status  COMMITMENT with [facility], confirmed 6/30/21 with CM

## 2021-07-22 NOTE — PROVIDER NOTIFICATION
07/22/21 0600   Sleep/Rest/Relaxation   Sleep/Rest/Relaxation appears asleep   Night Time # Hours 7 hours     Pt had a quiet night with no behavioral or safety concerns. Was observed sleeping during the safety checks. No issue noted or reported.

## 2021-07-23 VITALS
HEART RATE: 101 BPM | DIASTOLIC BLOOD PRESSURE: 76 MMHG | BODY MASS INDEX: 23.15 KG/M2 | TEMPERATURE: 98.4 F | SYSTOLIC BLOOD PRESSURE: 108 MMHG | OXYGEN SATURATION: 98 % | WEIGHT: 166 LBS | RESPIRATION RATE: 16 BRPM

## 2021-07-23 PROCEDURE — 250N000013 HC RX MED GY IP 250 OP 250 PS 637: Performed by: PSYCHIATRY & NEUROLOGY

## 2021-07-23 PROCEDURE — 99239 HOSP IP/OBS DSCHRG MGMT >30: CPT | Performed by: PSYCHIATRY & NEUROLOGY

## 2021-07-23 RX ORDER — OLANZAPINE 5 MG/1
5 TABLET ORAL 2 TIMES DAILY
Qty: 60 TABLET | Refills: 1 | Status: SHIPPED | OUTPATIENT
Start: 2021-07-23 | End: 2022-05-09

## 2021-07-23 RX ORDER — RISPERIDONE 3 MG/1
3 TABLET ORAL 2 TIMES DAILY
Qty: 60 TABLET | Refills: 1 | Status: SHIPPED | OUTPATIENT
Start: 2021-07-23 | End: 2022-05-09

## 2021-07-23 RX ORDER — RISPERIDONE 50 MG/2 ML
50 KIT INTRAMUSCULAR
Qty: 2 ML | Refills: 3
Start: 2021-07-29 | End: 2022-05-09

## 2021-07-23 RX ADMIN — OLANZAPINE 5 MG: 5 TABLET, FILM COATED ORAL at 08:45

## 2021-07-23 RX ADMIN — OMEPRAZOLE 40 MG: 20 CAPSULE, DELAYED RELEASE ORAL at 08:45

## 2021-07-23 RX ADMIN — RISPERIDONE 3 MG: 3 TABLET ORAL at 08:45

## 2021-07-23 ASSESSMENT — ACTIVITIES OF DAILY LIVING (ADL)
DRESS: INDEPENDENT
ORAL_HYGIENE: INDEPENDENT
HYGIENE/GROOMING: INDEPENDENT
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
## 2021-07-23 NOTE — PLAN OF CARE
"Problem: Sleep Disturbance (Manic or Hypomanic Signs/Symptoms)  Goal: Improved Sleep (Manic/Hypomanic Signs/Symptoms)  Outcome: No Change  Note: Pt continues to sleep through majority of the night with minimal disruptions.     NOC Shift Report    Pt in bed at beginning of shift, breathing quiet and unlabored. Pt slept through majority of the shift. Pt slept 7 hours.     No pt complaints or concerns at this time. Pt came out of room around 0200 and requested a snack, snack given. Pt approached staff and stated \"I am leaving today so I will see you guys on the flip side.\" Pt then returned to room to rest.     No PRNs given. Will continue to monitor.     Precautions: Elopement, Sexual, Self-Injury     "

## 2021-07-23 NOTE — DISCHARGE INSTRUCTIONS
Behavioral Discharge Planning and Instructions    Summary: You were admitted on 6/29/2021  due to Disorganized Thinking/Behaviors and Manic Symptomology.  You were treated by Dr. Santi Hoover MD, and discharged on 07/23/21 from [facility] to Home    Main Diagnosis:  1.  Schizoaffective disorder, bipolar type, erum.    2.  Cannabis use disorder is highly likely.    Health Care Follow-up:   As discussed with your treatment team, you have declined additional aftercare follow-up appointments and resources other then psychiatry. As a reminder you are currently under commitment and being Provisionally Discharged, which means you may be hospitalized again if you do not follow the expectations of the provisional discharge you agreed to. This includes not taking all medication as prescribed, attending your mental health appointments, and refraining from mariajuana or other drug/alcohol use. Please contact your  if you have further questions about you commitment; her contact information is listed bellow for your convenience.     Psychiatry/Med-Management Appointment, [facility] Please Fax AVS  Date/Time: 8/12/21 at 8:30am, in-person appointment  Provider: Dr. Crooks  Address: [address] [place], [place] [id number] [id number]  Phone: [phone]  Fax: [phone]    Risperdal Injection Appointment, [facility]  Date/Time: 7/29/21 at 11:30am, in-person appointment  Provider: RN of the day  Address: [address] [place], [place] [id number], [place] [address]  Phone: [phone]  Fax: [phone]    [place]  Provider: Yeny Robison   Phone: [phone]    Attend all scheduled appointments with your outpatient providers. Call at least 24 hours in advance if you need to reschedule an appointment to ensure continued access to your outpatient providers.     Major Treatments, Procedures and Findings:  You were provided with: a psychiatric assessment, assessed for medical  stability, medication evaluation and/or management, group therapy and milieu management    Symptoms to Report: increased confusion, losing more sleep, mood getting worse or thoughts of suicide    Early warning signs can include: increased depression or anxiety sleep disturbances increased thoughts or behaviors of suicide or self-harm  increased unusual thinking, such as paranoia or hearing voices    Safety and Wellness:  Take all medicines as directed.  Make no changes unless your doctor suggests them.      Follow treatment recommendations.  Refrain from alcohol and non-prescribed drugs.  If there is a concern for safety, call 911.    Resources:   Crisis Intervention: 135.883.7948 or 925-433-4785 (TTY: 484.279.1392).  Call anytime for help.  National [place] on Mental Illness (www.[place].krista.org): 479.506.4987 or 400-736-6255.  Alcoholics Anonymous (www.alcoholics-anonymous.org): Check your phone book for your local chapter.  Suicide Awareness Voices of Education (SAVE) (www.save.org): 490-137-MXZN (1974)  National Suicide Prevention Line (www.mentalhealthmn.org): 006-760-UQGO (8064)  Mental Health Consumer/Survivor Network of [place] (www.mhcsn.net): 114.439.3451 or 753-023-9098  Mental Health Association of [place] (www.mentalhealth.org): 923.111.8831 or 474-384-9792  Self- Management and Recovery Training., SMART-- Toll free: 609.887.1115  www.Gogo.org  [facility] Crisis (COPE) Response - Adult 264 154-1171    General Medication Instructions:   See your medication sheet(s) for instructions.   Take all medicines as directed.  Make no changes unless your doctor suggests them.   Go to all your doctor visits.  Be sure to have all your required lab tests. This way, your medicines can be refilled on time.  Do not use any drugs not prescribed by your doctor.  Avoid alcohol.    Advance Directives:   Scanned document on file with [place]? No scanned doc  Is document scanned? Pt states no documents  Honoring Choices  Your Rights Handout: Informed and given  Was more information offered? Pt declined    The Treatment team has appreciated the opportunity to work with you. If you have any questions or concerns about your recent admission, you can contact the unit which can receive your call 24 hours a day, 7 days a week. They will be able to get in touch with a Provider if needed. The unit number is [phone] .

## 2021-07-23 NOTE — PLAN OF CARE
Assessment/Intervention/Current Symtoms and Care Coordination  ANGEL (writer) met with trx team, provided update, and reviewed pt's chart. CTC met with pt this morning to review his PD and discharge plan. Pt was agreeable to discharge plan and once again declined any additional follow-up or community resources when offered. CTC emphasized that pt needed to remain on his medication as prescribed and could not change anything without his outpatient doctor changing his meds first. CTC also emphasized the pt needed to refrain from mariajuana use as well. CTC provided clarity that if a pt's PD is revoked that means he could be hospitalized again and this was clearly spelled out in his PD. Pt verbalized understanding and agreement, signing his PD. Pt's father will be providing transport and CTC informed pt that he was getting medication to take with him, so would need to wait to discharge until medications arrived. Pt reported that his father wouldn't be able to give him a ride until that afternoon and would check with the RN first before calling his dad for a ride. [facility] emailed PD to CM, Yeny Robison ([email or web address]), as per her request. As per discussion with CM earlier this week, she provided verbal agreement with discharge plan. PD will be faxed to the court either today or Monday after CM has a chance to sign and return.     Discharge Plan or Goal  Pt will be discharged back to home with outpatient follow-up for psychiatry and injectable medication, as per agreement with CM.      Barriers to Discharge   Safe discharge plan, ongoing symptom observation, and medication evaluation/assessment.     Referral Status  None, pt has a poor hx of follow-through and CM agrees that any additional services will likely not be pursued once pt returns to the community.      Legal Status  COMMITMENT with [facility], confirmed 6/30/21 with CM

## 2021-07-23 NOTE — PLAN OF CARE
Pt was escorted off the unit to father onside the unit. Pt verbalized understanding of discharge plan including discharge medications and follow up appointments. Pt verbalized readiness for discharge back home with parents. Pt denied SI and SIB. Pt denies anxiety and depression and was eager to discharge. Pt was medication compliant. Pt ate breakfast and lunch.  Pt left with medications and personal belongings.

## 2021-07-23 NOTE — PLAN OF CARE
Pt was out in the milieu pacing for much of the shift.  He did attend music therapy group this afternoon. He has been social with peers and staff appropriately. He is medication compliant and asks for his medications on his own. He denies all MH symptoms. He looks forward to discharge tomorrow and is feeling very ready to go. He spent time talking with staff and nursing students talking about his plans for what he wants to do upon discharge. He has not made any delusional statements tonight and has been keeping to himself when he realizes that is necessary such as at the desk.

## 2021-07-27 ENCOUNTER — TELEPHONE (OUTPATIENT)
Dept: PHARMACY | Facility: CLINIC | Age: 36
End: 2021-07-27

## 2021-07-27 NOTE — TELEPHONE ENCOUNTER
MTM referral from: Transitions of Care (recent hospital discharge or ED visit)    MTM referral outreach attempt #2 on July 27, 2021 at 1:42 PM      Outcome: Patient not reachable after several attempts, will route to MTM Pharmacist/Provider as an FYI. Thank you for the referral.    Cisco Osborn, MTM coordinator

## 2022-05-08 ENCOUNTER — TELEPHONE (OUTPATIENT)
Dept: BEHAVIORAL HEALTH | Facility: CLINIC | Age: 37
End: 2022-05-08
Payer: COMMERCIAL

## 2022-05-08 ENCOUNTER — HOSPITAL ENCOUNTER (OUTPATIENT)
Facility: CLINIC | Age: 37
Setting detail: OBSERVATION
Discharge: PSYCHIATRIC OR SUBSTANCE USE FACILITY | End: 2022-05-16
Attending: EMERGENCY MEDICINE | Admitting: PSYCHIATRY & NEUROLOGY
Payer: COMMERCIAL

## 2022-05-08 DIAGNOSIS — F12.10 CANNABIS ABUSE: ICD-10-CM

## 2022-05-08 DIAGNOSIS — F31.2 BIPOLAR AFFECTIVE DISORDER, CURRENT EPISODE MANIC WITH PSYCHOTIC SYMPTOMS (H): ICD-10-CM

## 2022-05-08 DIAGNOSIS — F25.0 SCHIZOAFFECTIVE DISORDER, BIPOLAR TYPE (H): ICD-10-CM

## 2022-05-08 LAB
ATRIAL RATE - MUSE: 85 BPM
DIASTOLIC BLOOD PRESSURE - MUSE: NORMAL MMHG
INTERPRETATION ECG - MUSE: NORMAL
P AXIS - MUSE: 64 DEGREES
PR INTERVAL - MUSE: 158 MS
QRS DURATION - MUSE: 82 MS
QT - MUSE: 330 MS
QTC - MUSE: 392 MS
R AXIS - MUSE: 77 DEGREES
SARS-COV-2 RNA RESP QL NAA+PROBE: NEGATIVE
SYSTOLIC BLOOD PRESSURE - MUSE: NORMAL MMHG
T AXIS - MUSE: 54 DEGREES
VENTRICULAR RATE- MUSE: 85 BPM

## 2022-05-08 PROCEDURE — C9803 HOPD COVID-19 SPEC COLLECT: HCPCS

## 2022-05-08 PROCEDURE — 99285 EMERGENCY DEPT VISIT HI MDM: CPT | Mod: CS,25

## 2022-05-08 PROCEDURE — 90791 PSYCH DIAGNOSTIC EVALUATION: CPT

## 2022-05-08 PROCEDURE — U0003 INFECTIOUS AGENT DETECTION BY NUCLEIC ACID (DNA OR RNA); SEVERE ACUTE RESPIRATORY SYNDROME CORONAVIRUS 2 (SARS-COV-2) (CORONAVIRUS DISEASE [COVID-19]), AMPLIFIED PROBE TECHNIQUE, MAKING USE OF HIGH THROUGHPUT TECHNOLOGIES AS DESCRIBED BY CMS-2020-01-R: HCPCS | Performed by: EMERGENCY MEDICINE

## 2022-05-08 PROCEDURE — 93005 ELECTROCARDIOGRAM TRACING: CPT

## 2022-05-09 PROCEDURE — 96372 THER/PROPH/DIAG INJ SC/IM: CPT

## 2022-05-09 PROCEDURE — 250N000013 HC RX MED GY IP 250 OP 250 PS 637: Performed by: EMERGENCY MEDICINE

## 2022-05-09 RX ORDER — POLYETHYLENE GLYCOL 400 2.5 MG/ML
1 SOLUTION/ DROPS OPHTHALMIC 2 TIMES DAILY PRN
COMMUNITY

## 2022-05-09 RX ORDER — OLANZAPINE 10 MG/1
10 TABLET, ORALLY DISINTEGRATING ORAL 2 TIMES DAILY PRN
Status: DISCONTINUED | OUTPATIENT
Start: 2022-05-09 | End: 2022-05-16 | Stop reason: HOSPADM

## 2022-05-09 RX ORDER — PANTOPRAZOLE SODIUM 40 MG/1
40 TABLET, DELAYED RELEASE ORAL
Status: DISCONTINUED | OUTPATIENT
Start: 2022-05-10 | End: 2022-05-09

## 2022-05-09 RX ORDER — FLUPHENAZINE DECANOATE 25 MG/ML
6.25 INJECTION, SOLUTION INTRAMUSCULAR; SUBCUTANEOUS
Status: ON HOLD | COMMUNITY
Start: 2022-04-14 | End: 2022-06-30

## 2022-05-09 RX ORDER — FLUPHENAZINE DECANOATE 25 MG/ML
6.25 INJECTION, SOLUTION INTRAMUSCULAR; SUBCUTANEOUS ONCE
Status: COMPLETED | OUTPATIENT
Start: 2022-05-09 | End: 2022-05-09

## 2022-05-09 RX ADMIN — OMEPRAZOLE 40 MG: 20 CAPSULE, DELAYED RELEASE ORAL at 19:14

## 2022-05-09 RX ADMIN — FLUPHENAZINE DECANOATE 6.25 MG: 25 INJECTION, SOLUTION INTRAMUSCULAR; SUBCUTANEOUS at 23:43

## 2022-05-09 ASSESSMENT — ENCOUNTER SYMPTOMS
ABDOMINAL PAIN: 0
COUGH: 0

## 2022-05-09 NOTE — ED NOTES
"Sam stated  \" I feel sorry for that girl, she is mentally ill, I am not:\"  He was reminded not to get involved on other's problems.  "
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
## 2022-05-09 NOTE — ED NOTES
Bed: BH3  Expected date:   Expected time:   Means of arrival:   Comments:  HEMS [phone] [id number] mental health restrained on hold eta 2118

## 2022-05-09 NOTE — ED NOTES
"Pt came to the window to know who his nurse was - he was told who his nurse was by staff - 2 minutes later pt came to the window to ask to talk to his nurse - I came to the window and asked pt what he needed - pt stated \" if you can please come into my room to talk because it will be longer than just going to the window - I asked what it was about - pt asked me to\"ust come to his room  Later. \"   "

## 2022-05-09 NOTE — ED NOTES
"Pt requests to speak with RN.  Pt reports: \"I am a 37 year old virgin who hears the voice of God, I used to be able to ejaculate 36 inches in the air, the medication broke my tamir, and now it just dribbles out, you have such beautiful eyes, please do not tell anyone I am hearing the voice of God.\"   "

## 2022-05-09 NOTE — ED PROVIDER NOTES
Sign Out Note    I took over care of this patient from Dr. Bloom    Briefly, patient presented to the ED for: mental health concerns    Plan at time of sign out: awaiting inpatient psychiatric placement    Events during my shift: no acute events    Signed out to Dr. Tyler at 1500 shift change.    MD Lance Bazzi Jeffrey Alan, MD  05/09/22 1321
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
## 2022-05-09 NOTE — ED NOTES
"Affect is intense, hyperverbal, reported hearing the voice of God, do not tell anyone because they are going to drug me.  \" I am not mentally ill,  I was misdiagnosed, just because I told them I was hearing the voices of God, by the way Anshul is a man and God is a female.\"  They put on a commitment because they confused me with the wrong lesa \"  I am brilliant,  I can do anything \"    "

## 2022-05-09 NOTE — TELEPHONE ENCOUNTER
R:  UDS still needs to be collected when able     No appropriate beds available within [facility] system at this time. Bed search update @ 06:22:    [facility]: @ cap per website  [facility]:@ cap per website  [facility]: @ cap per website  [facility]: @ cap per website  [facility]: @ cap per website  [facility]: @ cap per website  [facility]: @ cap per website  [facility]: @ cap per website  [facility]: @ cap per website  [facility]: @ cap per website  [facility]: @ cap per website  [facility]: @ cap per website  [facility]: @ cap per website  [facility]: Per previous calls made by this writer, they do not review overnight  [facility]: [facility]. Per Christ @ 05:40, they are closed to admits d/t staffing  [facility]: @ cap per website  [facility]: Posting 5 beds. Must have the cognitive ability to do programming. No aggressive or violent behavior or recent HX in the last 2 yrs. MH must be primary. Pt meets exclusionary criteria  [facility]: @ cap per website  [facility]: @ [facility]  [facility]: @ cap per website.  [facility]: @ cap per website  [facility]: @ cap per website    Pt remains on work list until appropriate placement is available
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
## 2022-05-10 PROCEDURE — 250N000013 HC RX MED GY IP 250 OP 250 PS 637: Performed by: EMERGENCY MEDICINE

## 2022-05-10 PROCEDURE — 250N000013 HC RX MED GY IP 250 OP 250 PS 637: Performed by: NURSE PRACTITIONER

## 2022-05-10 PROCEDURE — 99236 HOSP IP/OBS SAME DATE HI 85: CPT | Performed by: NURSE PRACTITIONER

## 2022-05-10 RX ORDER — LORAZEPAM 1 MG/1
1-2 TABLET ORAL ONCE
Status: COMPLETED | OUTPATIENT
Start: 2022-05-10 | End: 2022-05-10

## 2022-05-10 RX ORDER — LANOLIN ALCOHOL/MO/W.PET/CERES
3 CREAM (GRAM) TOPICAL
Status: DISCONTINUED | OUTPATIENT
Start: 2022-05-10 | End: 2022-05-16 | Stop reason: HOSPADM

## 2022-05-10 RX ORDER — LORAZEPAM 1 MG/1
1-2 TABLET ORAL EVERY 4 HOURS PRN
Status: DISCONTINUED | OUTPATIENT
Start: 2022-05-10 | End: 2022-05-16 | Stop reason: HOSPADM

## 2022-05-10 RX ORDER — LORAZEPAM 1 MG/1
2 TABLET ORAL ONCE
Status: DISCONTINUED | OUTPATIENT
Start: 2022-05-10 | End: 2022-05-10

## 2022-05-10 RX ADMIN — LORAZEPAM 1 MG: 1 TABLET ORAL at 21:24

## 2022-05-10 RX ADMIN — OMEPRAZOLE 40 MG: 20 CAPSULE, DELAYED RELEASE ORAL at 08:29

## 2022-05-10 RX ADMIN — LORAZEPAM 1 MG: 1 TABLET ORAL at 17:59

## 2022-05-10 RX ADMIN — MELATONIN TAB 3 MG 3 MG: 3 TAB at 05:49

## 2022-05-10 ASSESSMENT — PATIENT HEALTH QUESTIONNAIRE - PHQ9: SUM OF ALL RESPONSES TO PHQ9 QUESTIONS 1 & 2: 0

## 2022-05-10 ASSESSMENT — ACTIVITIES OF DAILY LIVING (ADL)
ORAL_HYGIENE: INDEPENDENT
HYGIENE/GROOMING: HANDWASHING;INDEPENDENT

## 2022-05-10 NOTE — ED NOTES
Long conversation with patient about his Amish views, history of hospitalizations, and patient shared many stories from his life.

## 2022-05-10 NOTE — PROGRESS NOTES
Patient appears pacing around the unit wearing headphones. Appears elated. Patient approached nursing station and asked staff individually what their names were. Writer also introduced self to patient. Patient told writer he was available to talk at anytime.
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
## 2022-05-10 NOTE — PHARMACY-ADMISSION MEDICATION HISTORY
Pharmacy Medication History  Admission medication history interview status for the 5/8/2022  admission is complete. See EPIC admission navigator for prior to admission medications     Location of Interview: Patient room  Medication history sources: Patient, Surescripts and Care Everywhere    Significant changes made to the medication list:  Adjusted Prolixin interval q14d --> q19d  Added blink tears PRN     In the past week, patient estimated taking medication this percent of the time: greater than 90%    Additional medication history information:   Patient is a reliable historian.     Per Care Everywhere, Prolixin taper is as follows:   37.5 mg q2 weeks 12/3/21-11/18/22 (?)   25 mg q2 weeks 1/21/22- __   18.5 mg q2 weeks 2/17/22-3/1/23 (?)   12.5 mg q2 weeks 3/30/22-4/12/23 (?)   6.25 mg q2 weeks 5/3/22-5/16/22   Telephone encounter 4/21/22 in Care Everywhere lowered dose from 12.5 mg --> 6.25 mg (last administered dose 12.5 mg).     Medication reconciliation completed by provider prior to medication history? No    Time spent in this activity: 20 minutes    Prior to Admission medications    Medication Sig Last Dose Taking? Auth Provider   fluPHENAZine decanoate (PROLIXIN) 25 MG/ML injection Inject 6.25 mg into the muscle Take every 19 days (prescribed every 14 days) 4/19/2022 Yes Unknown, Entered By History   omeprazole (PRILOSEC) 40 MG DR capsule Take 40 mg by mouth daily 5/9/2022 at AM Yes Unknown, Entered By History   polyethylene glycol 400 (BLINK TEARS) 0.25 % SOLN ophthalmic solution Place 1 drop into both eyes 2 times daily as needed for dry eyes Unknown at PRN Yes Unknown, Entered By History       The information provided in this note is only as accurate as the sources available at the time of update(s)   Luann West, VamsiD

## 2022-05-10 NOTE — ED NOTES
"Patient updated on breakfast. Patient states, \"I haven't slept since Sunday. They won't give me my weed here. I am fortified for this job.\"  "

## 2022-05-10 NOTE — ED NOTES
Pt very thankful to staff for his care, states this is the first hospital in 20 years that he has felt respected. Pt reports he is going to try to get some sleep, lights dimmed. Pt resting in bed, no needs at this time.

## 2022-05-10 NOTE — ED NOTES
"The following collateral information was received from Yeny Robison - [phone], [facility]  with [employer]:     \"Sam is on commitment currently. He is not answering my phone calls and he does this from time to time. Sam will often try to get doctor to lower dose of injection, will try to get doctor to change his meds. He will become pretty volatile in the office if things don't go the way he wants it. He wants to [facility] because he feels meds have been physically detrimental. He wanted me to get him a , which I did not, so that may be why he's mad at me right now. I've worked with him since May 2019.\"    Sam provided verbal consent for writer to share information regarding plan of care with Yeny, . Writer informed Yeny that Sam is on the waitlist for inpt psych admission and that he is on an involuntary hold. Yeny requested updates on placement/poc as able.   Update: Sam signed NEHA for Yeny Robison, will be scanned to medical records.     Dolly Gregory, LGSW      "

## 2022-05-10 NOTE — ED PROVIDER NOTES
Sign Out Note    I took over care of this patient from Dr. Adrian    Briefly, patient presented to the ED for: psychiatric concerns    Plan at time of sign out: awaiting psych placement    Events during my shift: transferred to [facility] for further mental health care    MD Lance Bazzi, Juventino Reyes MD  05/10/22 1524
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
## 2022-05-10 NOTE — TELEPHONE ENCOUNTER
R:  Urine drug screen still needs to be collected when able    No appropriate beds within [facility] system. Bed search update @ 04:16:    [facility]: @ cap per website  [facility]:@ cap per website  [facility]: @ cap per website  [facility]: @ cap per website  [facility]: @ cap per website  [facility]: @ cap per website  [facility]: @ cap per website  [facility]: @ cap per website  [facility]: @ cap per website  [facility]: @ cap per website  [facility]: @ cap per website  [facility]: @ cap per website  [facility]: Posting 1 bed. Per Ania @ 03:19, they are only able to review for very low acuity (mood disorders only - no aggression, psychosis, erum). Pt not appropriate for current bed available  [facility]: Per previous calls made by this writer, facility does not review referrals overnight  [facility]: @ cap per website  [facility]: @ cap per website  [facility]: Posting 5 beds - Must have the cognitive ability to do programming. No aggressive or violent behavior or recent HX in the last 2 yrs. MH must be primary. Pt meets exclusionary criteria  [facility]: @ cap per website   [facility]: @ cap per website  [facility]: @ cap per website  [facility]: Per Yeny @ 02:08, they do not have adult beds available tonight [facility]: @ cap per website    Pt remains on work list until appropriate placement is available
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
## 2022-05-10 NOTE — ED NOTES
"Pacing in the unit, \" I hear the voice of God, the psychiatric medications are bad \" I used to be able to ejaculate 49 inches up in the air, now I can't\"  He as reminded his conversation was not appropriate.  Delusional, keeps talking about law suits and wants to know if he can compensation from the [address] . If we give him oral medications he will contact [employer].  He is an agent of God and if someone tries to break that bond, he will go absolutely crazy.  "
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
## 2022-05-11 PROBLEM — F12.10 CANNABIS ABUSE: Status: ACTIVE | Noted: 2022-05-11

## 2022-05-11 PROBLEM — F31.2 BIPOLAR AFFECTIVE DISORDER, CURRENT EPISODE MANIC WITH PSYCHOTIC SYMPTOMS (H): Status: ACTIVE | Noted: 2022-05-11

## 2022-05-11 PROBLEM — F25.0 SCHIZOAFFECTIVE DISORDER, BIPOLAR TYPE (H): Status: ACTIVE | Noted: 2022-05-11

## 2022-05-11 PROCEDURE — 250N000013 HC RX MED GY IP 250 OP 250 PS 637: Performed by: NURSE PRACTITIONER

## 2022-05-11 PROCEDURE — 250N000013 HC RX MED GY IP 250 OP 250 PS 637: Performed by: EMERGENCY MEDICINE

## 2022-05-11 PROCEDURE — G0378 HOSPITAL OBSERVATION PER HR: HCPCS

## 2022-05-11 RX ADMIN — OMEPRAZOLE 40 MG: 20 CAPSULE, DELAYED RELEASE ORAL at 10:23

## 2022-05-11 RX ADMIN — LORAZEPAM 2 MG: 1 TABLET ORAL at 17:18

## 2022-05-11 RX ADMIN — LORAZEPAM 2 MG: 1 TABLET ORAL at 12:12

## 2022-05-11 NOTE — ED NOTES
"Mood is anxious, frequent requests, he is happy he can talk to God and spread his word,  He is hoping that the Government will soon give a job for the [employer]. Since he is so smart and knowledgeable, he is delusional he told me \" My motherly love cure his foot, no one was able to cure him before \"   Later he went to his room he said  \" I am going to talk to Jonathon, he was sitting in a round cushion naked meditating.  He goes from person to person and coming to the desk talking and he does not make sense.  "

## 2022-05-11 NOTE — TELEPHONE ENCOUNTER
R:  Urine drug screen still needs to be collected when able    No appropriate beds within [facility] currently. Bed search update @ 05:29:    [facility]: @ cap per website  [facility]:@ cap per website  [facility]: @ cap per website  [facility]: @ cap per website  [facility]: @ cap per website  [facility]: @ cap per website  [facility]: @ cap per website  [facility]: @ cap per website  [facility]: @ cap per website  [facility]: @ cap per website  [facility]: @ cap per website  [facility]: @ cap per website  [facility]: @ cap per website  [facility]: @ cap per website   [facility]: Posting 2 beds. Per Keegan @ 03:43, he is only able to review for low acuity and is currently reviewing a referral  [facility]: Posting 1 bed. Voluntary patients only. No hx of aggression, violence or assault. Per Lorena @ 04:08 she is reviewing to cap and recommends a callback later to see if bed is still available  [facility]: Posting 5 beds. Must have the cognitive ability to do programming. No aggressive or violent behavior or recent HX in the last 2 yrs. MH must be primary. Meets exclusionary criteria  [facility]: @ cap per website  [facility]: Posting 2 beds. Lower acuity, Neg Covid. Per Sharath @ 04:16 they do not have any beds available [facility]: @ cap per website  [facility]: Posting 4 beds. Per Yeny @ 00:30, they do not have any more adult beds available tonight [facility]: @ cap per website    Pt remains on work list until appropriate placement is available

## 2022-05-11 NOTE — ED NOTES
Writer spoke with Yeny Robison CM for [employer].  [phone].  She reports that she filed the intent to revoke this AM and its been accepted via [employer].  It has not been supported as of this time and will check in by the end of the day to see if that has changed considering his hold will  tomorrow AM.
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
## 2022-05-11 NOTE — ED NOTES
"Pt just came up to this writer and said \"so many banana and not enough monkey\"He also states he hopes the Government will give him a job. He has been pacing and restless. Pt did take 2 mg of Ativan and states he needs to sleep.  "

## 2022-05-11 NOTE — PROGRESS NOTES
"Patient approached nursing station and stated to writer \"I tried to sleep after taking the ativan earlier. It didn't work. I was mediating the whole time. I was almost there, but not quite.\" Patient requested another dose of ativan hoping that it would be just enough to help him actually fall asleep.   "

## 2022-05-11 NOTE — ED NOTES
"Patient requested PRN Ativan for sleep. Ativan 2 mg was given to patient. Patient states, \"Thank you. Now I'm going to go do my Tibetan throat singing and then go to bed\". Will continue to monitor.   "

## 2022-05-11 NOTE — ED NOTES
Writer left a VM with CM inquiring into if petition was supported.  Requested a call back.    Writer rec'd call back from Yeny Robison.  She reports that she did receive notice that the revocation WAS SUPPORTED.  She will fax both the intent to revoke and the support signed from the .

## 2022-05-12 PROCEDURE — 250N000013 HC RX MED GY IP 250 OP 250 PS 637: Performed by: NURSE PRACTITIONER

## 2022-05-12 PROCEDURE — G0378 HOSPITAL OBSERVATION PER HR: HCPCS

## 2022-05-12 RX ADMIN — LORAZEPAM 2 MG: 1 TABLET ORAL at 21:59

## 2022-05-12 NOTE — PROGRESS NOTES
Pt slept in sensory room B until about 1145am. Pt is grandiose and hyper verbal. Pt frequently comes up to  and seeks attention. Pt is redirectable. Plan is to go IP and still waiting for a bed. VSS

## 2022-05-12 NOTE — ED NOTES
"Patient reports that fatigue is his \"friend\" and part of his \"mission\" is staying awake so he can pass on the light of God to others. He also is fixated on meeting with the [facility] to explain his \"mission\".   "

## 2022-05-12 NOTE — ED NOTES
Still awaiting bed placement at this time. Patient has been sleeping for the majority of the shift in sensory room B. Continues to be disorganized. Insight is impaired. Religiously pre-occupied and grandiose. However, remains pleasant and cooperative. Patient has been compliant with taking Ativan PRN. As of today, stay of commitment has been revoked. Will continue to monitor until bed placement.

## 2022-05-12 NOTE — TELEPHONE ENCOUNTER
Bed Search 05/12/2022 Morning done at 8:55a    Adults:    [facility] is posting 0 beds.     [facility] is posting 0 beds.    [facility] is posting 0 beds.    [facility] is posting 0 beds.    [facility] is posting 0 beds.    [facility] is posting 0 beds.    [facility] is posting 0 beds.    [facility] is posting 0 beds.      [facility] is posting 0 beds. Mixed unit 12+. Low acuity only.     [facility] is positing 0 beds. No aggression.     [facility] is posting 0 beds.     [facility] is posting 0 beds. Low acuity only.    [facility] is posting 0 beds.    [facility] is posting 0 beds. Low acuity.     [facility] is posting 2 beds. 72 hr hold required. Pt does not fit open bed available.    [facility] is posting 0 beds.       [facility] is posting 0 beds. Vol only, No Hx of aggression, violence or assault. No sexual offenders. No 72 hr holds.    [facility] is posting 5 beds. (Must have the cognitive ability to do programming. No aggressive or violent behavior or recent HX in the last 2 yrs. MH must be primary.) Pt does not fit open bed available.    [facility] is posting 0 beds. Low acuity only. Violence and aggression capped.     [facility] is posting 2 beds. Low acuity, Neg Covid. Pt does not fit open bed available.    [facility] is posting 0 beds. Covid neg. Vol only. Combined adolescent and adult unit. No aggressive or violent behavior. No registered sex offenders.      [facility] is posting 2 beds. Call for details. Pt does not fit open bed available.    [facility] is posting 1 beds. Pt does not fit open bed available.    9:40a Intake called [facility] ([facility])- facility can review pt through Nurse and will call Intake back with more information.    9:48a Intake received a call back from [facility] (Nurse)- Their high acuity beds are not available at this time.  Pt  does not fit open bed available.    9:53a [facility] at capacity. The pt remains on the waitlist. Intake continues to identify appropriate bed placement.

## 2022-05-12 NOTE — ED NOTES
"At the start of shift, patient requested to speak to this Writer. He is fixated on his experience at inpatient psychiatry at [facility]. He is very mistrustful of that hospital. Continues to have flight of ideas. He states, \"Anshul is a thieve. All he did was drink moonshine and steal from others\". Patient also talked about his past experience with his old girl friend on when they first met. He describes, \"It was love at first sight and I actually went up to her after we locked eyes and whispered in her ear \"Will you  me?\". She instantly had an orgasm in her pants.\" Patient is redirectable. Denying PRN ativan at this time, but said he would take it later. Continues to pace around the milieu. Remains pleasant at this time.    "

## 2022-05-13 PROCEDURE — G0378 HOSPITAL OBSERVATION PER HR: HCPCS

## 2022-05-13 NOTE — PROGRESS NOTES
Patient was able to fall asleep in sensory room B. Respirations even and unlabored. Changing positions occasionally.

## 2022-05-13 NOTE — ED NOTES
Pt woke up later at 1200 and was surprised he slept in so much. Pt was asking about discharge and concerned about what he had signed yesterday in order to transport to [facility]. Pt was explained that the plan is still to go IP and pt was in agreement for now. Pt's provisional discharge was revoked and is currently on a court hold. Paper work filed in chart. Pt remains hyperverbal tangential and makes loose associations in conversation. Pt is religiously  and sexually preoccupied. Pt is constantly up at the nurses station making comments to staff and is attention seeking. Pt continues to pace on the unit. Plan for IP placement.

## 2022-05-13 NOTE — ED NOTES
"Patient reports to PA, \"I'll make a deal with you, why don't you skip rounds on me or I'm going to shit the bed\". Patient reminded that unit policy requires 15 minute checks to ensure safety. Patient is wrapped in blanket, returns to sensory room B ,currently resting but is awake.   "

## 2022-05-13 NOTE — PROGRESS NOTES
Pt's presentation remains similar to prior shifts. Pt is hyper verbal, intrusive, religiously preoccupied and constantly seeking attention from staff. Pt is redirectable.  VSS  Still awaiting placement.

## 2022-05-13 NOTE — ED NOTES
"Patient is now sleeping comfortably in sensory room B. Respirations are equal and unlabored. Continues to be religiously pre-occupied and grandiose. Speech is hyper verbal and has flight of ideas. Insight remains poor. Presented disorganized and anxious in mood. Denies SI, HI, but endorses auditory hallucinations that are in the shape of a female voice that he describes as \"God\". Still awaiting IP bed placement. Patient is aware and still agreeable. Will continue to monitor.   "

## 2022-05-13 NOTE — ED NOTES
"Patient approached this writer after doing yoga in his rooming stating, \"I'm going to try to get some sleep without the Ativan, but if I wake up I'll come out to take it\". Continues to be pleasant at this time.   "

## 2022-05-14 ENCOUNTER — TELEPHONE (OUTPATIENT)
Dept: BEHAVIORAL HEALTH | Facility: CLINIC | Age: 37
End: 2022-05-14
Payer: COMMERCIAL

## 2022-05-14 PROCEDURE — 250N000013 HC RX MED GY IP 250 OP 250 PS 637: Performed by: EMERGENCY MEDICINE

## 2022-05-14 PROCEDURE — G0378 HOSPITAL OBSERVATION PER HR: HCPCS

## 2022-05-14 PROCEDURE — 250N000013 HC RX MED GY IP 250 OP 250 PS 637: Performed by: NURSE PRACTITIONER

## 2022-05-14 RX ADMIN — OMEPRAZOLE 40 MG: 20 CAPSULE, DELAYED RELEASE ORAL at 10:09

## 2022-05-14 RX ADMIN — LORAZEPAM 1 MG: 1 TABLET ORAL at 15:08
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
## 2022-05-14 NOTE — ED NOTES
"Pt. Requesting ativan 1mg due to not being able to achieve relaxation for \"days\" . States that he needs \"complete sensory deprivation\" to relax.   Requested to be called \"god\" today and reports I can refer to him as \"Sam\" tomorrow but I am \"never\" to say his middle name.  Awaiting Inpatient placement.   "

## 2022-05-14 NOTE — ED NOTES
"This writer had long conversation with patient in sensory room B at patient's request. He reports, \"bring a pad of paper and pen, you're going to want to remember all of this beautiful truth. I am not mentally ill, I am not schizoaffective, I am brilliant, and I am hypervigilant\". Patient shares several stories from his background. Hyperverbal, loose associations, delusions of persecution and grandeur. Then returns to pacing unit. He stops at nursing station frequently \"It is my obligation to share the voice of God with you\". \"God follows the bong and not the gun\". \"Monks are not as strong as warriors\". However, he is behaviorally in control and redirectable.  "

## 2022-05-14 NOTE — PROGRESS NOTES
"Patient approached RN and asked if they could talk. Patient requested to talk with RN in private in conference room. Patient tangential in conversation, flight of ideas. Patient talked about how he applied to the [facility] because he has information to share that they need to know and they are going to want to cut him open and look at his brain once he shares what he knows. Expressed that God is a woman and he can talk to her and she sends him messages. Believes that we are all actually dead right now, and when we \"die\" we actually become alive. Stated God punishes those who go to [facility] with exposure from the sun, lighting strikes, and outer space, which is extremely cold. Expressed that God can travel places in 1 second in comparison to other deities that can travel at light speed. Stated God is mad at humanity right now because we are reproducing at alarming rates. Patient compared hell to being trapped in a bad dream that you cannot get out of. Patient asked RN to imagine strangling him to death and that if that were to happen, we would form a bond when we went to [facility]. After patient ended the conversation, he asked RN if they remembered all the things that he talked about. Told RN that the reason RN could not remember all the details is because God performed \"men in black\" on them and fogged their memory. Asked RN to let him do an experiment where RN asks God for recall and he will check back in to see if they remember all the details of the conversation.   "
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
## 2022-05-14 NOTE — PROGRESS NOTES
"Pt anxious, pacing. Speech pressured, tangential at times. Preoccupied with Buddhism, delusions of being supernatural and having supernatural contact. Frequently seeks out staff to interact and \"explain my story\". Frequent stops at nurses station when hyperactive with illogical statements. \"Do you know monks don't talk? They're crazier than warriors\". \"I don't like to talk about me, I have to kill my ego daily to be reborn\". \"The NELLY will cut me open, because they need to find the signal\".  "

## 2022-05-14 NOTE — ED NOTES
Pt. awaken after being approached by a psychotic patient. Pt. became defensive of safety and security and yelled at patient to back off. Able to be directed to remain calm. Very anxious after episode. Pacing and rambling. Religous delusions are the theme of his thought content. Ate breakfast. Requiring frequent supportive contacts. Can be intrusive with peers and staff with his random thoughts.

## 2022-05-15 PROCEDURE — 250N000013 HC RX MED GY IP 250 OP 250 PS 637: Performed by: EMERGENCY MEDICINE

## 2022-05-15 PROCEDURE — G0378 HOSPITAL OBSERVATION PER HR: HCPCS

## 2022-05-15 RX ADMIN — OMEPRAZOLE 40 MG: 20 CAPSULE, DELAYED RELEASE ORAL at 09:24

## 2022-05-15 NOTE — TELEPHONE ENCOUNTER
R: Pt in [facility] ER awaiting placement    11pm bed search: No appropriate beds available in [place] or [facility]    Pt placed on work list until appropriate placement is available

## 2022-05-15 NOTE — PROGRESS NOTES
Pt is calm and cooperative this shift. He was able to take a nap in sensory room today. Pt remains to be delusional, grandiose with flight of ideas. Intrusive at times.

## 2022-05-15 NOTE — ED NOTES
"\" It is going to take 7 years to recover from the damage that clozaril has done to my body\" . Continues to pace and approach writer every couple of minutes to share random thoughts.   "

## 2022-05-15 NOTE — ED NOTES
"Upon awaking patient began to pace. Hyper verbal and seeking out all staff and peers sharing \"messages\" from god. Reports that he would like to \"take the day off today\" from preaching the truth. Reports that his mind is become clearer as he has less of the \"poisonous\" medication in his system.  Woke with c/o of a headache which resolved with breakfast and fluids. Continues to await Inpatient  bed.   "

## 2022-05-15 NOTE — PROGRESS NOTES
Pt slept well most of the night. Woke up around 0530, visited with staff for a little while, then went back to sleep. Awaiting IP placement. Calm and cooperative.

## 2022-05-16 ENCOUNTER — HOSPITAL ENCOUNTER (INPATIENT)
Facility: CLINIC | Age: 37
LOS: 46 days | Discharge: HOME | DRG: 885 | End: 2022-07-01
Attending: PSYCHIATRY & NEUROLOGY | Admitting: PSYCHIATRY & NEUROLOGY
Payer: COMMERCIAL

## 2022-05-16 ENCOUNTER — TELEPHONE (OUTPATIENT)
Dept: BEHAVIORAL HEALTH | Facility: CLINIC | Age: 37
End: 2022-05-16
Payer: COMMERCIAL

## 2022-05-16 VITALS
TEMPERATURE: 98.3 F | WEIGHT: 152.2 LBS | SYSTOLIC BLOOD PRESSURE: 129 MMHG | HEART RATE: 79 BPM | BODY MASS INDEX: 21.79 KG/M2 | HEIGHT: 70 IN | OXYGEN SATURATION: 98 % | RESPIRATION RATE: 16 BRPM | DIASTOLIC BLOOD PRESSURE: 85 MMHG

## 2022-05-16 DIAGNOSIS — F25.0 SCHIZOAFFECTIVE DISORDER, BIPOLAR TYPE (H): Primary | ICD-10-CM

## 2022-05-16 DIAGNOSIS — R25.1 TREMOR: ICD-10-CM

## 2022-05-16 DIAGNOSIS — M79.609 PAIN IN EXTREMITY, UNSPECIFIED EXTREMITY: ICD-10-CM

## 2022-05-16 DIAGNOSIS — G47.00 INSOMNIA, UNSPECIFIED TYPE: ICD-10-CM

## 2022-05-16 PROBLEM — F29 PSYCHOSIS (H): Status: ACTIVE | Noted: 2022-05-16

## 2022-05-16 PROCEDURE — G0378 HOSPITAL OBSERVATION PER HR: HCPCS

## 2022-05-16 PROCEDURE — 250N000013 HC RX MED GY IP 250 OP 250 PS 637: Performed by: EMERGENCY MEDICINE

## 2022-05-16 PROCEDURE — 99223 1ST HOSP IP/OBS HIGH 75: CPT | Mod: AI | Performed by: PSYCHIATRY & NEUROLOGY

## 2022-05-16 PROCEDURE — 124N000002 HC R&B MH UMMC

## 2022-05-16 RX ORDER — OLANZAPINE 10 MG/2ML
10 INJECTION, POWDER, FOR SOLUTION INTRAMUSCULAR 3 TIMES DAILY PRN
Status: DISCONTINUED | OUTPATIENT
Start: 2022-05-16 | End: 2022-05-17

## 2022-05-16 RX ORDER — ACETAMINOPHEN 325 MG/1
650 TABLET ORAL EVERY 4 HOURS PRN
Status: DISCONTINUED | OUTPATIENT
Start: 2022-05-16 | End: 2022-07-01 | Stop reason: HOSPADM

## 2022-05-16 RX ORDER — HYDROXYZINE HYDROCHLORIDE 25 MG/1
25 TABLET, FILM COATED ORAL EVERY 4 HOURS PRN
Status: DISCONTINUED | OUTPATIENT
Start: 2022-05-16 | End: 2022-07-01 | Stop reason: HOSPADM

## 2022-05-16 RX ORDER — FLUPHENAZINE DECANOATE 25 MG/ML
6.25 INJECTION, SOLUTION INTRAMUSCULAR; SUBCUTANEOUS ONCE
Status: DISCONTINUED | OUTPATIENT
Start: 2022-05-28 | End: 2022-05-20

## 2022-05-16 RX ORDER — OLANZAPINE 10 MG/1
10 TABLET ORAL 3 TIMES DAILY PRN
Status: DISCONTINUED | OUTPATIENT
Start: 2022-05-16 | End: 2022-05-17

## 2022-05-16 RX ORDER — LANOLIN ALCOHOL/MO/W.PET/CERES
3 CREAM (GRAM) TOPICAL
Status: DISCONTINUED | OUTPATIENT
Start: 2022-05-16 | End: 2022-06-08

## 2022-05-16 ASSESSMENT — ACTIVITIES OF DAILY LIVING (ADL)
ORAL_HYGIENE: INDEPENDENT
WEAR_GLASSES_OR_BLIND: YES
WALKING_OR_CLIMBING_STAIRS_DIFFICULTY: NO
VISION_MANAGEMENT: GLASSES
TOILETING_ISSUES: NO
DOING_ERRANDS_INDEPENDENTLY_DIFFICULTY: NO
CHANGE_IN_FUNCTIONAL_STATUS_SINCE_ONSET_OF_CURRENT_ILLNESS/INJURY: NO
CONCENTRATING,_REMEMBERING_OR_MAKING_DECISIONS_DIFFICULTY: NO
HYGIENE/GROOMING: INDEPENDENT
DIFFICULTY_EATING/SWALLOWING: NO
DRESS: INDEPENDENT
FALL_HISTORY_WITHIN_LAST_SIX_MONTHS: NO
DRESSING/BATHING_DIFFICULTY: NO
LAUNDRY: WITH SUPERVISION

## 2022-05-16 NOTE — TELEPHONE ENCOUNTER
R: Pt in [facility] awaiting placement    5:55pm: Paged Resident to present for 22/Yvan  6:10pm: Resident accepts for 22/Yvan/[facility] team Intake to fax revoked PD to [id number]  6:40pm: Intake informed unit of pt placement and report time, said ER can call for report whenever ready. Intake informed [facility] ER of pt placement and report time.
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
## 2022-05-16 NOTE — ED NOTES
"Awake and hyper-verbal/ hyperactive. Pacing and greeting everyone present on the unit. stretching and bending. \"I am sore from exercising yesterday-now I need to re-hydrate because I released so much water and glucose last night\" Ordered breakfast with direction. Continues to ramble Synagogue statements about being a messenger from god. Professes that he is not mentally ill. Reports that he is an expert on psychiatric medications and claims that they are poison to his body. Treats himself with marijuana, chamomile tea and occasional ativan.  Reports that he has applied for the [facility] so he can take a polygraph to prove he is not lying about receiving messages from god.      "
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
## 2022-05-16 NOTE — TELEPHONE ENCOUNTER
R: Pt in [facility] ER awaiting placement    9pm bed search: No appropriate beds available in [place] or [facility]     Pt placed on work list until appropriate placement is available

## 2022-05-16 NOTE — PROGRESS NOTES
Pt continuous to be grandiose, delusional and religiously preoccupied. Pt is hyper verbal and constantly at the  seeking attention from staff. Pt is intrusive at times and requires redirection.

## 2022-05-17 PROCEDURE — 124N000002 HC R&B MH UMMC

## 2022-05-17 PROCEDURE — 250N000013 HC RX MED GY IP 250 OP 250 PS 637: Performed by: STUDENT IN AN ORGANIZED HEALTH CARE EDUCATION/TRAINING PROGRAM

## 2022-05-17 PROCEDURE — G0177 OPPS/PHP; TRAIN & EDUC SERV: HCPCS

## 2022-05-17 PROCEDURE — 90853 GROUP PSYCHOTHERAPY: CPT

## 2022-05-17 RX ORDER — FLUPHENAZINE HYDROCHLORIDE 5 MG/ML
2.5 SOLUTION, CONCENTRATE ORAL AT BEDTIME
Status: DISCONTINUED | OUTPATIENT
Start: 2022-05-17 | End: 2022-05-18

## 2022-05-17 RX ORDER — FLUPHENAZINE HYDROCHLORIDE 2.5 MG/ML
2.5 INJECTION, SOLUTION INTRAMUSCULAR AT BEDTIME
Status: DISCONTINUED | OUTPATIENT
Start: 2022-05-17 | End: 2022-05-18

## 2022-05-17 RX ORDER — FLUPHENAZINE HYDROCHLORIDE 2.5 MG/ML
2.5 INJECTION, SOLUTION INTRAMUSCULAR EVERY 6 HOURS PRN
Status: DISCONTINUED | OUTPATIENT
Start: 2022-05-17 | End: 2022-07-01 | Stop reason: HOSPADM

## 2022-05-17 RX ORDER — FLUPHENAZINE HYDROCHLORIDE 5 MG/ML
2.5 SOLUTION, CONCENTRATE ORAL EVERY 6 HOURS PRN
Status: DISCONTINUED | OUTPATIENT
Start: 2022-05-17 | End: 2022-07-01 | Stop reason: HOSPADM

## 2022-05-17 RX ADMIN — FLUPHENAZINE HYDROCHLORIDE 2.5 MG: 5 SOLUTION, CONCENTRATE ORAL at 21:17

## 2022-05-17 RX ADMIN — OMEPRAZOLE 40 MG: 20 CAPSULE, DELAYED RELEASE ORAL at 14:51

## 2022-05-17 ASSESSMENT — ACTIVITIES OF DAILY LIVING (ADL)
ORAL_HYGIENE: INDEPENDENT
LAUNDRY: WITH SUPERVISION
DRESS: INDEPENDENT
HYGIENE/GROOMING: INDEPENDENT
ORAL_HYGIENE: INDEPENDENT
DRESS: INDEPENDENT
HYGIENE/GROOMING: INDEPENDENT
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
## 2022-05-17 NOTE — PHARMACY-ADMISSION MEDICATION HISTORY
Please see Admission Medication History completed on 5/9/22 under previous encounter at [facility] for information regarding prior to admission medications.    Adina Pepper, PharmD, BCPS

## 2022-05-17 NOTE — PLAN OF CARE
05/17/22 1303   Individualization/Patient Specific Goals   Patient Personal Strengths family/social support;expressive of emotions;expressive of needs   Patient Vulnerabilities lacks insight into illness;poor impulse control   Anxieties, Fears or Concerns pt too disorganized to assess   Patient-Specific Goals (Include Timeframe) take medications daily, attend groups daily   Interprofessional Rounds   Participants psychiatrist;CTC;nursing   Team Discussion   Participants Dr. Swan, resident Joan Vivar, DORA Sommer, CTC Sera Vieira, two medical students   Progress new admission   Anticipated length of stay until stabilization   Continued Stay Criteria/Rationale pt's PD was revoked   Medical/Physical none mentioned in team   Precautions 15 minute checks   Rationale for change in precautions or plan no change   Anticipated Discharge Disposition home with family   PRECAUTIONS AND SAFETY    Behavioral Orders   Procedures    Code 1 - Restrict to Unit    Routine Programming     As clinically indicated    Sexual precautions    Status 15     Every 15 minutes.       Safety  Safety WDL: WDL  Patient Location: patient room, own  Observed Behavior: pacing  Safety Measures: safety plan reviewed  Suicidality: Status 15  Sexual: status 15, private room

## 2022-05-17 NOTE — PROGRESS NOTES
Patient agreeable to discharge plan. Discharge instructions reviewed with patient including follow-up care plan. Reviewed safety plan and outpatient resources. Denies SI and HI. All belongings that were brought into the hospital have been returned to patient. Escorted off the unit at 2030 accompanied by [facility] staff. Discharged to [place] via EMT.

## 2022-05-17 NOTE — PLAN OF CARE
05/17/22 1818   Group Therapy Session   Group Attendance attended group session   Time Session Began 1715   Time Session Ended 1805   Total Time patient participated (minutes) 50   Group Type psychotherapeutic   Group Topic Covered coping skills/lifestyle management   Group Session Detail Group participated in discussing the self-care wheel. Discussed it generally as a group; the importance of balancing the different areas, what each one might look like for the individual, how they care for themselves within that wheel. Group members then did individual exercise with pen and paper containing self-care wheel writing ways to care for self within the different areas. Brief discussion afterwards about anything that struck them while doing the exercise.   Patient Response/Contribution cooperative with task;discussed personal experience with topic;listened actively   Patient Response Detail Pt participated actively. Pt was pleasant to another group member who got upset with him for pt's encouraging comment to yet a different group member.

## 2022-05-17 NOTE — PLAN OF CARE
05/16/22 2133   Patient Belongings   Did you bring any home meds/supplements to the hospital?  Yes   Disposition of meds  Sent to security/pharmacy per site process   Patient Belongings locker;sent to security per site process   Patient Belongings Put in Hospital Secure Location (Security or Locker, etc.) cash/credit card;cell phone/electronics;clothing;medication(s);wallet;shoes   Belongings Search Yes   Clothing Search Yes   Second Staff Nina ZAPATA     Locker:    1 shirt  1 pant  1 belt  1 phone  1 pair of shoes  1 carabiner  Wallet  ID/Insurance    Security:    Envelope ([id number])-  Fluphenazine  Omeprazole    Envelope ([id number])-  Visa...[id number]  Visa.8 hours  EBT...[id number]      A               Admission:  I am responsible for any personal items that are not sent to the safe or pharmacy.  [facility] is not responsible for loss, theft or damage of any property in my possession.    Signature:  _________________________________ Date: _______  Time: _____                                              Staff Signature:  ____________________________ Date: ________  Time: _____      2nd Staff person, if patient is unable/unwilling to sign:    Signature: ________________________________ Date: ________  Time: _____     Discharge:  [facility] has returned all of my personal belongings:    Signature: _________________________________ Date: ________  Time: _____                                          Staff Signature:  ____________________________ Date: ________  Time: _____

## 2022-05-17 NOTE — PLAN OF CARE
"  Problem: Manic Behavior Episode  Goal: Decreased Manic Symptoms  Outcome: Ongoing, Progressing     Problem: Sleep Disturbance  Goal: Adequate Sleep/Rest  Outcome: Ongoing, Progressing     Patient was awake when shift started, he walked up and down the black for hours  and stopped at the nurses station severally to talk to the staff. He often complimented staff on various things, clothes, hair etc He was respectiful. Patient was religiously preoccupied, paranoid and delusional. He would jump from one topic to another and ramble on. Presents as manic and disorganized. Declined offer of any prn medication for sleep.  When informed of his fasting labs this morning, stated \"I am going to eat. I will not let them draw my blood. I will refuse.\"  Patient states he does not take orders from anyone. Patient ate snacks and drank chamomile tea. Around 0130, he informed staff he was going to rest and meditate and try to sleep. Observed to have slept for about 4.0 hours.   "

## 2022-05-17 NOTE — PLAN OF CARE
Assessment/Intervention/Current Symptoms and Care Coordination      Attended team meeting and reviewed chart notes    Writer left message with JAYJAY Robison ([phone]) asking if they plan to recommit pt as his commitment is up 6/28/2022.            Discharge Plan or Goal  Return home when stable      Barriers to Discharge   Needs stabilization      Referral Status  None made      Legal Status  Pt's PD was revoked
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
## 2022-05-17 NOTE — PLAN OF CARE
"Occupational Therapy Group Note:     05/17/22 1500   Group Therapy Session   Group Attendance attended group session   Time Session Began 1320   Time Session Ended 1415   Total Time patient participated (minutes) 55   Total # Attendees 4   Group Type life skill   Group Topic Covered balanced lifestyle;coping skills/lifestyle management;structured socialization   Group Session Detail discussion & gentle movement   Patient Response/Contribution cooperative with task;discussed personal experience with topic;listened actively   Patient Response Detail Pt actively participated in a structured occupational therapy group with a focus on social engagement and movement. Pt had the option to choose a self-reflection question or a gentle exercise/stretching movement, and pt primarily chose to answer questions. Pt appeared comfortable responding to discussion prompts, and also followed along with all guided movements. Shared that his favorite holiday is \"4/20,\" then identified \"Beni\" when writer redirected by asking what his second favorite holiday is. When prompted to identify a goal he has for the next week, he passionately discussed proving to his doctor that he is mentally stable. When prompted to identify a hobby he would like to return to, he discussed his goal to bring his sketch pad to the arboret[place] and get back into his hobby of drawing in the future. Pleasant and engaged. Affect appeared somewhat elated at times. Will continue to assess. Pt will be given self-assessment form, and OT staff will explain the purpose of including them in their treatment plan and offer options for meeting their needs. Initial assessment to be completed upon additional group participation.        "

## 2022-05-18 PROCEDURE — 99233 SBSQ HOSP IP/OBS HIGH 50: CPT | Mod: GC | Performed by: PSYCHIATRY & NEUROLOGY

## 2022-05-18 PROCEDURE — 250N000013 HC RX MED GY IP 250 OP 250 PS 637: Performed by: STUDENT IN AN ORGANIZED HEALTH CARE EDUCATION/TRAINING PROGRAM

## 2022-05-18 PROCEDURE — 124N000002 HC R&B MH UMMC

## 2022-05-18 RX ORDER — FLUPHENAZINE HYDROCHLORIDE 2.5 MG/ML
2.5 INJECTION, SOLUTION INTRAMUSCULAR AT BEDTIME
Status: DISCONTINUED | OUTPATIENT
Start: 2022-05-18 | End: 2022-05-19

## 2022-05-18 RX ORDER — LORAZEPAM 1 MG/1
1 TABLET ORAL
Status: COMPLETED | OUTPATIENT
Start: 2022-05-18 | End: 2022-05-18

## 2022-05-18 RX ORDER — FLUPHENAZINE HYDROCHLORIDE 5 MG/ML
5 SOLUTION, CONCENTRATE ORAL AT BEDTIME
Status: DISCONTINUED | OUTPATIENT
Start: 2022-05-18 | End: 2022-05-19

## 2022-05-18 RX ADMIN — FLUPHENAZINE HYDROCHLORIDE 5 MG: 5 SOLUTION, CONCENTRATE ORAL at 21:26

## 2022-05-18 RX ADMIN — OMEPRAZOLE 40 MG: 20 CAPSULE, DELAYED RELEASE ORAL at 08:47

## 2022-05-18 RX ADMIN — LORAZEPAM 1 MG: 1 TABLET ORAL at 03:41

## 2022-05-18 ASSESSMENT — ACTIVITIES OF DAILY LIVING (ADL)
ORAL_HYGIENE: INDEPENDENT
HYGIENE/GROOMING: INDEPENDENT
DRESS: INDEPENDENT
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
## 2022-05-18 NOTE — PLAN OF CARE
Assessment/Intervention/Current Symptoms and Care Coordination    Attended team meeting and reviewed chart note.      Writer received call back from JAYJAY Robison. She is planning on filing a recommit and will be in contact with the 's office to see if anything is needed from the hospital.    Writer faxed the exam statement and the guo request to Carlton OJEDA for the recommitment.      Discharge Plan or Goal  Return home      Barriers to Discharge   Needs stabilization      Referral Status  None made    Legal Status  Pt's PD was revoked- He is committed with Guo order  JAYJAY is starting the process of recommit
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
## 2022-05-18 NOTE — PLAN OF CARE
"Pt talking with peer in unge at start of shift. Pt reports being on a \"36 hour day schedule\" where he stays awake for more than 24 hours at a time- regular sleep pattern promoted- given chamomile tea with no effect. Pt rapidly paced down the halls explaining that he is detoxing his body, getting rid of all the bad sugars he used to eat. Pt often stops at nurses desk to talk about various topics including medicinal marijuana, Congregational connection he feels with a peer, or his connection to various government agencies. Makes comments on physical appearance of others. Pt became increasingly agitated when talking about his court process and the \"incompetence\" of doctors, and his plans to jay them if he has time. Refused fasting for labs, saying he won't give blood and he needs to continue his \"food loop\"-ate bananas. Pt voiced being frustrated for not being able to sleep for \"days\". Pt offered available PRNs including melatonin, prolixin, and hydroxyzine- pt declined them all, saying they're poison. Pt then said the only thing that works for him is taking ativan about once a month as last resort- on call provider notified of situation -ordered and given one time PRN 1mg oral ativan for sleep and agitation with effectiveness. Pt requested vital signs be taken because he has pectoral muscle pain- denies shortness of breath or other sx. B/P: 111/74, T: 98.3, P: 74, R: 18 Appeared to sleep 2.25 hours.    Problem: Manic Behavior Episode  Goal: Decreased Manic Symptoms  Outcome: Ongoing, Not Progressing     Problem: Sleep Disturbance  Goal: Adequate Sleep/Rest  Outcome: Ongoing, Not Progressing     Problem: Cognitive Impairment (Psychotic Signs/Symptoms)  Goal: Optimal Cognitive Function (Psychotic Signs/Symptoms)  Outcome: Ongoing, Not Progressing    "

## 2022-05-18 NOTE — PROGRESS NOTES
"Brief Cross Cover Note    Subjective:  Notified by nursing that patient agitated, restless, and manic. Hasnt slept in days.  Patient reports Ativan is effective for sleep and agitation.     Per chart, patient has received Ativan as needed in the past with good effect. Patient offered PRN fluphenazine, but declined as he is \"trying to taper off this medication.\"    Objective:  BP (!) 127/94 (BP Location: Right arm)   Pulse 96   Temp 97.6  F (36.4  C) (Tympanic)   Resp 18   Ht 1.83 m (6' 0.05\")   Wt 71.7 kg (158 lb)   SpO2 95%   BMI 21.40 kg/m        Assessment & Plan  Sam Minor is a 37 year old male with previous psychiatric diagnoses of schizoaffective disorder bipolar type and substance use disorder who presents with erum (delusions of grandeur, Cheondoism delusions, pressured speech, hypersexuality, and disinhibition) and psychosis (auditory hallucinations) in the context of tapering his Prolixin. He is committed with Jo. Now requesting medication for sleep/agitation/erum and declining PRN fluphenazine. A one time dose of Ativan 1 mg was ordered.     Ruby Argueta MD  PGY-2 Psychiatry Resident        " PCXR done at this time
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
## 2022-05-19 PROCEDURE — 99233 SBSQ HOSP IP/OBS HIGH 50: CPT | Mod: GC | Performed by: PSYCHIATRY & NEUROLOGY

## 2022-05-19 PROCEDURE — 250N000013 HC RX MED GY IP 250 OP 250 PS 637: Performed by: STUDENT IN AN ORGANIZED HEALTH CARE EDUCATION/TRAINING PROGRAM

## 2022-05-19 PROCEDURE — 124N000002 HC R&B MH UMMC

## 2022-05-19 RX ORDER — FLUPHENAZINE HYDROCHLORIDE 5 MG/ML
10 SOLUTION, CONCENTRATE ORAL AT BEDTIME
Status: DISCONTINUED | OUTPATIENT
Start: 2022-05-19 | End: 2022-05-23

## 2022-05-19 RX ORDER — LORAZEPAM 1 MG/1
1 TABLET ORAL EVERY 4 HOURS PRN
Status: DISCONTINUED | OUTPATIENT
Start: 2022-05-19 | End: 2022-06-27

## 2022-05-19 RX ORDER — FLUPHENAZINE HYDROCHLORIDE 2.5 MG/ML
2.5 INJECTION, SOLUTION INTRAMUSCULAR AT BEDTIME
Status: DISCONTINUED | OUTPATIENT
Start: 2022-05-19 | End: 2022-05-23

## 2022-05-19 RX ORDER — LORAZEPAM 1 MG/1
1 TABLET ORAL
Status: CANCELLED | OUTPATIENT
Start: 2022-05-19

## 2022-05-19 RX ADMIN — OMEPRAZOLE 40 MG: 20 CAPSULE, DELAYED RELEASE ORAL at 11:36

## 2022-05-19 RX ADMIN — LORAZEPAM 1 MG: 1 TABLET ORAL at 21:40

## 2022-05-19 RX ADMIN — FLUPHENAZINE HYDROCHLORIDE 10 MG: 5 SOLUTION, CONCENTRATE ORAL at 21:38
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
## 2022-05-19 NOTE — PLAN OF CARE
"  Problem: Cognitive Impairment (Psychotic Signs/Symptoms)  Goal: Optimal Cognitive Function (Psychotic Signs/Symptoms)  Intervention: Support and Promote Cognitive Ability  Recent Flowsheet Documentation  Taken 5/18/2022 1732 by Jesus Mata RN  Trust Relationship/Rapport:    care explained    choices provided    emotional support provided    empathic listening provided    questions answered    questions encouraged    reassurance provided    thoughts/feelings acknowledged    Patient has been visible on the unit and in the milieu interacting with peers and staff, pacing the hallway with headphone on\" I'm listening to classical music, I'm supposed to listen to two classical music daily to strengthen my brain which help me communicate with God better. Do you know God is a female? She talks to me all the time \". Patient requested prn ativan for sleep \" when I'm in the hospital ativan is the only thing that help me sleep\", writer contacted the on call MD but no new order given. Patient agreed to drink few cups of chamomile tea which might help with sleep, he refused to take prn hydroxyzine or melatonin for sleep \" those are poison and I don't have any mental health problems\".  Shower after taking schedule medication, denies SI/SIB/ and HI. Continues to be distrustful and suspicious of staff, religiosity and compulsive with disorganized speech.  Patient said \" I'm going to jay Dr Swan, he keep changing my medications for no reason\".  Patient continues to be hyper-verbal and pacing in the hallway, had on sneaker with lace on them \" the doctor said I can have my shoes on when I'm up because of injury to my foot\", patient agreed to have his room door lock and turn in his sneaker before going to bed. Writer notice a blister under patient 4th toe on right foot, area cleanse and patient requested band aid which was applied.             /87   Pulse 93   Temp 97.1  F (36.2  C) (Tympanic)   Resp 17   Ht 1.83 " "m (6' 0.05\")   Wt 71.7 kg (158 lb)   SpO2 96%   BMI 21.40 kg/m                                  "

## 2022-05-19 NOTE — PLAN OF CARE
"  Problem: Sleep Disturbance  Goal: Adequate Sleep/Rest  Outcome: Ongoing, Not Progressing   Goal Outcome Evaluation:    Pt was awake pacing the hallway at the beginning of the shift, this writer offered prn Melatonin and Atarax at 0015 but he declined, pt stated  \"those are poison for the body\", he paced the hallway multiple times before retiring to bed at 0045, he appeared to have slept for 5.5  hours.   "
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
## 2022-05-20 PROCEDURE — 250N000013 HC RX MED GY IP 250 OP 250 PS 637: Performed by: STUDENT IN AN ORGANIZED HEALTH CARE EDUCATION/TRAINING PROGRAM

## 2022-05-20 PROCEDURE — 99233 SBSQ HOSP IP/OBS HIGH 50: CPT | Mod: GC | Performed by: PSYCHIATRY & NEUROLOGY

## 2022-05-20 PROCEDURE — 124N000002 HC R&B MH UMMC

## 2022-05-20 RX ORDER — FLUPHENAZINE DECANOATE 25 MG/ML
12.5 INJECTION, SOLUTION INTRAMUSCULAR; SUBCUTANEOUS
Status: DISCONTINUED | OUTPATIENT
Start: 2022-05-23 | End: 2022-05-31

## 2022-05-20 RX ADMIN — LORAZEPAM 1 MG: 1 TABLET ORAL at 21:42

## 2022-05-20 RX ADMIN — FLUPHENAZINE HYDROCHLORIDE 10 MG: 5 SOLUTION, CONCENTRATE ORAL at 21:42

## 2022-05-20 RX ADMIN — LORAZEPAM 1 MG: 1 TABLET ORAL at 11:03

## 2022-05-20 RX ADMIN — LORAZEPAM 1 MG: 1 TABLET ORAL at 17:26

## 2022-05-20 RX ADMIN — OMEPRAZOLE 40 MG: 20 CAPSULE, DELAYED RELEASE ORAL at 08:51

## 2022-05-20 ASSESSMENT — ACTIVITIES OF DAILY LIVING (ADL)
ORAL_HYGIENE: INDEPENDENT
DRESS: INDEPENDENT
HYGIENE/GROOMING: INDEPENDENT
ORAL_HYGIENE: INDEPENDENT
LAUNDRY: WITH SUPERVISION
HYGIENE/GROOMING: INDEPENDENT
LAUNDRY: WITH SUPERVISION
DRESS: INDEPENDENT
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
## 2022-05-20 NOTE — PLAN OF CARE
"Goal Outcome Evaluation:    Plan of Care Reviewed With: patient        Problem: Behavioral Health Plan of Care  Goal: Plan of Care Review  Outcome: Ongoing, Progressing  Flowsheets (Taken 5/20/2022 1100)  Plan of Care Reviewed With: patient  Patient Agreement with Plan of Care: agrees     Problem: Behavioral Health Plan of Care  Goal: Optimized Coping Skills in Response to Life Stressors  Intervention: Promote Effective Coping Strategies  Recent Flowsheet Documentation  Taken 5/20/2022 1100 by Kay Conner RN  Supportive Measures: active listening utilized     Patient remained in his room this shift. When called out for breakfast patient stated \"I am too medicated to get the f.. out of my bed.\" Stated that he is is refusing to work 30 miles a day anymore. Patient though alert and oriented x 3, observed to demonstrate disorganized thought process with loose association of words. Stated that he has bruising and swelling on his feet. On physical examination, both feet appeared normal without bruises or swelling. Patient said he will stay in bed all day today to rest his feet. Patient requested RN to inform the doctor that his Prolixin dose was too much for him. Complained that the provider is \"incompetent to differentiate between erum and sugar overload. She doesn't realize I work 30 miles a day.\" Patient was compliant with his medications. PRN Ativan administered at request at 1103.   Patient finally came out of his room at 1215 power pacing and has rapid speech. Obviously manic. demostrates poor insight to his condition. \"I am not mentally ill. I drank 2 pints of vodka and 20 cans of mountain dew daily for 10 years. That is sugar overload not erum.\"  Patient continues to complain about his provider not paying attention to his complaints but instead continues to give him Prolixin. Says Prolixin drains his energy. Patient appears hyperactive and hyperverbal. Repeats himself over and over complaining to staff " as well as to peers. Patient ate lunch well. Says he was going to take as much Ativan he can today and stay in his room and sleep. No assault behavior.

## 2022-05-20 NOTE — PLAN OF CARE
Pt appeared to sleep 7 hours, an improvement from previous nights. No PRNs given or requested. No medical or behavioral events this shift.      Problem: Sleep Disturbance  Goal: Adequate Sleep/Rest  Outcome: Ongoing, Progressing
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
## 2022-05-20 NOTE — PLAN OF CARE
Assessment/Intervention/Current Symptoms and Care Coordination  - chart review  - team meeting     Discharge Plan or Goal  Pending stabilization & development of a safe discharge plan.    Barriers to Discharge   Patient requires further psychiatric stabilization due to current symptomology    Referral Status  No new referrals      Legal Status   Pt's PD was revoked- He is committed with Jo order  CM is starting the process of recommit

## 2022-05-21 PROCEDURE — 124N000002 HC R&B MH UMMC

## 2022-05-21 PROCEDURE — 250N000013 HC RX MED GY IP 250 OP 250 PS 637: Performed by: STUDENT IN AN ORGANIZED HEALTH CARE EDUCATION/TRAINING PROGRAM

## 2022-05-21 RX ADMIN — FLUPHENAZINE HYDROCHLORIDE 10 MG: 5 SOLUTION, CONCENTRATE ORAL at 21:04

## 2022-05-21 RX ADMIN — LORAZEPAM 1 MG: 1 TABLET ORAL at 11:37

## 2022-05-21 RX ADMIN — LORAZEPAM 1 MG: 1 TABLET ORAL at 16:19

## 2022-05-21 RX ADMIN — LORAZEPAM 1 MG: 1 TABLET ORAL at 21:04

## 2022-05-21 RX ADMIN — OMEPRAZOLE 40 MG: 20 CAPSULE, DELAYED RELEASE ORAL at 11:33

## 2022-05-21 ASSESSMENT — ACTIVITIES OF DAILY LIVING (ADL)
ORAL_HYGIENE: INDEPENDENT
HYGIENE/GROOMING: INDEPENDENT
LAUNDRY: WITH SUPERVISION
DRESS: INDEPENDENT
ORAL_HYGIENE: INDEPENDENT
LAUNDRY: WITH SUPERVISION
HYGIENE/GROOMING: INDEPENDENT
DRESS: INDEPENDENT

## 2022-05-21 NOTE — PLAN OF CARE
"Goal Outcome Evaluation:    Plan of Care Reviewed With: patient     Problem: Manic Behavior Episode  Goal: Decreased Manic Symptoms  5/20/2022 2035 by Kay Conner RN  Outcome: Ongoing, Progressing  5/20/2022 1132 by aKy Conner RN  Outcome: Ongoing, Progressing  Intervention: Promote Mood Equilibrium  Recent Flowsheet Documentation  Taken 5/20/2022 2030 by Kay Conner RN  Behavior Management: impulse control promoted  Supportive Measures: active listening utilized  Taken 5/20/2022 1100 by Kay Conner RN  Supportive Measures: active listening utilized      Patient took a nap and came out of his room at 1720. Patient observed to be talkative, rambling and continued to perseverate on his habit of drinking combination of mountain dew and vodka which made his body saturated with sugar. Patient continues to emphasize that he is not manic or schizophrenia. Requested RN to inform his provider that his BP is near normal that means he is succeeding in getting rid of the toxin in his body. He bas well want his provider to know that he did not pace around today but stayed in his room. \"When I walk they say I am manic. I am not manic.\" Patient received PRN Ativan at request at 1726 and 2142. He was compliant with night dose of Prolixin. Patient appeared comfortable taking the prescribed dose stating, \"Its tolerable.\" Patient wants his blood sugar to be checked so that he will prove his point to his provider. RN encouraged patient to allow his blood draw tomorrow because that will better prove his point. Patient disagrees and walked away. Patient remained in his room most of the evening shift.       "

## 2022-05-21 NOTE — PLAN OF CARE
"Goal Outcome Evaluation:    Plan of Care Reviewed With: patient     Problem: Manic Behavior Episode  Goal: Decreased Manic Symptoms  Outcome: Ongoing, Progressing  Intervention: Promote Mood Equilibrium  Recent Flowsheet Documentation  Taken 5/21/2022 1143 by Kay Conner RN  Behavior Management: impulse control promoted  Supportive Measures: active listening utilized        Patient slept through breakfast. He woke up at 11:30. Was compliant with vital signs check. Requested for his morning medication and PRN Ativan. Still intense and talkative but very much less than previous day. Continues to demonstrate disorganized thought process. Patient claims that he is calmer because he has no more toxin in his body. \"The doctor will claim that Prolixin is working but it's not.\" Was dismissive and guarded during assessment. Patient responded, \"I'm good, I'm good all day everyday ma'am.\" Patient had his breakfast before going back to his room. Came out for lunch. He stayed out for a brief moment before he retired to his room. No assault or SIB behaviors noted.         "

## 2022-05-21 NOTE — PLAN OF CARE
Pt appeared to sleep 6.75 hours. No PRNs given or requested. No medical or behavioral events this shift.      Problem: Sleep Disturbance  Goal: Adequate Sleep/Rest  Outcome: Ongoing, Progressing
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
## 2022-05-22 PROCEDURE — 124N000002 HC R&B MH UMMC

## 2022-05-22 PROCEDURE — 250N000013 HC RX MED GY IP 250 OP 250 PS 637: Performed by: STUDENT IN AN ORGANIZED HEALTH CARE EDUCATION/TRAINING PROGRAM

## 2022-05-22 RX ADMIN — FLUPHENAZINE HYDROCHLORIDE 10 MG: 5 SOLUTION, CONCENTRATE ORAL at 21:03

## 2022-05-22 RX ADMIN — LORAZEPAM 1 MG: 1 TABLET ORAL at 16:33

## 2022-05-22 RX ADMIN — LORAZEPAM 1 MG: 1 TABLET ORAL at 21:03

## 2022-05-22 RX ADMIN — OMEPRAZOLE 40 MG: 20 CAPSULE, DELAYED RELEASE ORAL at 12:13

## 2022-05-22 RX ADMIN — LORAZEPAM 1 MG: 1 TABLET ORAL at 04:13

## 2022-05-22 RX ADMIN — LORAZEPAM 1 MG: 1 TABLET ORAL at 12:20

## 2022-05-22 ASSESSMENT — ACTIVITIES OF DAILY LIVING (ADL)
LAUNDRY: WITH SUPERVISION
DRESS: INDEPENDENT
ORAL_HYGIENE: INDEPENDENT
LAUNDRY: WITH SUPERVISION
HYGIENE/GROOMING: INDEPENDENT
ORAL_HYGIENE: INDEPENDENT
DRESS: INDEPENDENT
HYGIENE/GROOMING: INDEPENDENT

## 2022-05-22 NOTE — PLAN OF CARE
Pt appeared to sleep 6.5 hours. Pt woke and requested PRN ativan for sleep disturbance- given @0413- compliant with cheeking precautions. Pt c/o foot pain at this time- declined intervention or PRN. Reported blister is improving.     Problem: Sleep Disturbance  Goal: Adequate Sleep/Rest  Outcome: Ongoing, Progressing

## 2022-05-22 NOTE — PLAN OF CARE
"Goal Outcome Evaluation:    Plan of Care Reviewed With: patient        Problem: Manic Behavior Episode  Goal: Decreased Manic Symptoms  Outcome: Ongoing, Progressing  Intervention: Promote Mood Equilibrium  Recent Flowsheet Documentation  Taken 5/22/2022 1300 by Kay Conner RN  Behavior Management: impulse control promoted  Supportive Measures: active listening utilized        Patient's manic behavior appears to be under control. Patient chooses to be isolative in his room. Says he will stay in his room to rest and meditate until the doctor comes and see he is not manic. When approached by RN in his room, patient stated \"I was talking with the satellite. Thought process observed to be disorganized with tangential speech. Did not come out to eat breakfast. Came out for his lunch. Received PRN Ativan together with his am medication at 1220. Vital signs were within defined limit. During assessment patient stated \"I am agitated because I am not being treated well.\" Patient believes he is not supposed to be here because he is not mentally ill. No outburst noted.      "

## 2022-05-22 NOTE — PLAN OF CARE
"Goal Outcome Evaluation:    Plan of Care Reviewed With: patient        Problem: Behavioral Health Plan of Care  Goal: Adheres to Safety Considerations for Self and Others  Intervention: Develop and Maintain Individualized Safety Plan  Recent Flowsheet Documentation  Taken 5/22/2022 1734 by Kay Conner, RN  Safety Measures: safety rounds completed  Taken 5/22/2022 1300 by Kay Conner RN  Safety Measures: safety rounds completed     Patient remained in his room most part of the shift. He comes out for meals. PRN Ativan administered at 1633 and 2103 at request. Was pleasant on approach, disorganized, and tangential. Was compliant with night medication. Patient was happy that Prolixin dose remained at 10 mg. Still complaining \"I have taken more Prolixin here than I have taken in my entire life.\"  Had shower today.         "

## 2022-05-23 PROCEDURE — 250N000013 HC RX MED GY IP 250 OP 250 PS 637: Performed by: STUDENT IN AN ORGANIZED HEALTH CARE EDUCATION/TRAINING PROGRAM

## 2022-05-23 PROCEDURE — 99233 SBSQ HOSP IP/OBS HIGH 50: CPT | Mod: GC | Performed by: PSYCHIATRY & NEUROLOGY

## 2022-05-23 PROCEDURE — 250N000011 HC RX IP 250 OP 636: Performed by: STUDENT IN AN ORGANIZED HEALTH CARE EDUCATION/TRAINING PROGRAM

## 2022-05-23 PROCEDURE — 124N000002 HC R&B MH UMMC

## 2022-05-23 RX ORDER — FLUPHENAZINE HYDROCHLORIDE 2.5 MG/ML
2.5 INJECTION, SOLUTION INTRAMUSCULAR AT BEDTIME
Status: ACTIVE | OUTPATIENT
Start: 2022-05-23 | End: 2022-05-27

## 2022-05-23 RX ORDER — FLUPHENAZINE HYDROCHLORIDE 5 MG/ML
15 SOLUTION, CONCENTRATE ORAL AT BEDTIME
Status: COMPLETED | OUTPATIENT
Start: 2022-05-23 | End: 2022-05-26

## 2022-05-23 RX ADMIN — LORAZEPAM 1 MG: 1 TABLET ORAL at 20:28

## 2022-05-23 RX ADMIN — LORAZEPAM 1 MG: 1 TABLET ORAL at 05:00

## 2022-05-23 RX ADMIN — FLUPHENAZINE DECANOATE 12.5 MG: 25 INJECTION, SOLUTION INTRAMUSCULAR; SUBCUTANEOUS at 08:42

## 2022-05-23 RX ADMIN — LORAZEPAM 1 MG: 1 TABLET ORAL at 09:02

## 2022-05-23 RX ADMIN — LORAZEPAM 1 MG: 1 TABLET ORAL at 14:39

## 2022-05-23 RX ADMIN — FLUPHENAZINE HYDROCHLORIDE 15 MG: 5 SOLUTION, CONCENTRATE ORAL at 20:28

## 2022-05-23 NOTE — PLAN OF CARE
Occupational Therapy Group Note      05/23/22 1308   Group Therapy Session   Group Attendance attended group session   Total Time patient participated (minutes) 8   Group Session Detail Topic: OT Open Clinic (100 minutes) for creative expression, promoting autonomy, building sense of self-worth, coping with stress/symptoms and opportunity to exercise cognitive skills (i.e. initiation, planning, organization, sequencing, sustained attention, follow-through, termination of task and overall safety awareness).   Patient Response/Contribution Pt attended group check in and was generally polite but disinterested in the group activities.  Pt shared that sleep is something that went well for him but said he never actually feels rested.  Writer gave pt an introduction to the role of OT and let pt look through a project idea book.  Pt did looked through the book but did not select a project and left the group shortly, stating that he was going to go lay down.  Affect: blunted. Mood: varied between content and irritable.

## 2022-05-23 NOTE — PLAN OF CARE
"Goal Outcome Evaluation:    Plan of Care Reviewed With: patient        Problem: Manic Behavior Episode  Goal: Decreased Manic Symptoms  Outcome: Ongoing, Progressing  Intervention: Promote Mood Equilibrium  Recent Flowsheet Documentation  Taken 5/23/2022 0900 by Kay Conner RN  Behavior Management: impulse control promoted  Supportive Measures: active listening utilized     Problem: Manic Behavior Episode  Goal: Decreased Manic Symptoms  Intervention: Promote Mood Equilibrium  Recent Flowsheet Documentation  Taken 5/23/2022 0900 by Kay Conner RN  Behavior Management: impulse control promoted  Supportive Measures: active listening utilized     Patient requested his breakfast tray to be brought to his room. He ate 75% of his meal. RN went to administer IM Prolixin, patient said, \"Wait, is today the 23rd? Patient received affirmative answer. He queried RN about medication dose and quantity. Patient's questions were answered. He wanted to know if the medication was drawn from patient own medication. Patient reassured that his home medications are kept for him until discharge. \"Now I'm done taking oral medication. The only medication I get is injection. I'm not going to be ere for another 18 day waiting to receive another dose.\" Patient appeared excited to be receiving IM Prolixin because per patient it means it is time for him to be discharged. Patient declined am dose of Omeprazole stating, \"I don't take it everyday. I can skip that.\" Though still hyper verbal, tangential, and disorganized, patient was still in good spirit hoping for discharge soon when he  received a call from pre-petitioner. Patient then became agitated, came to the desk. \"My commitment is supposed to end in June and now it's extended by one year. Do you know how much that will cost me? 3 f.. thousand Dollars.\" Patient was pacing back and forth the hallway wearing headphones and continued talking and cursing. Was easily redirected " "which did not last at that time evidenced by patient coming back to the desk to make one nonsensical comment or the other.   Patient appeared responding to internal stimuli as evidenced by patient seen talking and smiling to himself. Demonstrated some Evangelical preoccupation and grandiosity claiming to hear the voice of god. \"I hear the voice of god and I have scientific method to lead others to it.\"  Patient called RN into his room at 1433 to say that \"I'm extremely pissed.\" Patient in his rapid tangential speech said that his provider is loading him up with Prolixin. Patient stated that because of that he can't sleep. \"I'm having racing thoughts, I'm getting dehydrated, all I keep doing is drink water. All she has to do was to switch the needle that's all, switch the needle.\" RN listened while patient continued to ramble until he gradually closed the door causing RN to back out of the room. Patient received PRN Ativan x 2 this shift. Last dose given at 1439. Maintains intermittent pacing. After venting on RN, patient comes back multiple times to apologize.       "

## 2022-05-23 NOTE — PLAN OF CARE
Assessment/Intervention/Current Symptoms and Care Coordination    Attended team meeting and reviewed chart notes.    Pt continues to experience symptoms; pressured speech, grandiosity.    [facility] called today to screen pt for the recommitment.       Discharge Plan or Goal  Return home    Barriers to Discharge   Needs further stabilization      Referral Status  None made      Legal Status  Pt is committed with Jo order  [facility] is in the process of a recomit as pt's commitment will expires 6/22.
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
## 2022-05-24 PROCEDURE — 124N000002 HC R&B MH UMMC

## 2022-05-24 PROCEDURE — 250N000013 HC RX MED GY IP 250 OP 250 PS 637: Performed by: STUDENT IN AN ORGANIZED HEALTH CARE EDUCATION/TRAINING PROGRAM

## 2022-05-24 PROCEDURE — 99233 SBSQ HOSP IP/OBS HIGH 50: CPT | Mod: GC | Performed by: PSYCHIATRY & NEUROLOGY

## 2022-05-24 RX ADMIN — LORAZEPAM 1 MG: 1 TABLET ORAL at 11:20

## 2022-05-24 RX ADMIN — LORAZEPAM 1 MG: 1 TABLET ORAL at 05:13

## 2022-05-24 RX ADMIN — LORAZEPAM 1 MG: 1 TABLET ORAL at 21:58

## 2022-05-24 RX ADMIN — OMEPRAZOLE 40 MG: 20 CAPSULE, DELAYED RELEASE ORAL at 11:20

## 2022-05-24 RX ADMIN — FLUPHENAZINE HYDROCHLORIDE 15 MG: 5 SOLUTION, CONCENTRATE ORAL at 21:58

## 2022-05-24 ASSESSMENT — ACTIVITIES OF DAILY LIVING (ADL)
HYGIENE/GROOMING: INDEPENDENT
DRESS: INDEPENDENT
ORAL_HYGIENE: INDEPENDENT
HYGIENE/GROOMING: INDEPENDENT
ORAL_HYGIENE: INDEPENDENT
LAUNDRY: WITH SUPERVISION
DRESS: INDEPENDENT
LAUNDRY: WITH SUPERVISION

## 2022-05-24 NOTE — PLAN OF CARE
Problem: Manic Behavior Episode  Goal: Decreased Manic Symptoms  Outcome: Ongoing, Progressing     Problem: Pain Acute  Goal: Acceptable Pain Control and Functional Ability  Outcome: Ongoing, Progressing  Intervention: Prevent or Manage Pain  Recent Flowsheet Documentation  Taken 5/23/2022 1946 by Winifred Curry RN  Medication Review/Management: medications reviewed     Problem: Suicidal Behavior  Goal: Suicidal Behavior is Absent or Managed  Outcome: Ongoing, Progressing   Goal Outcome Evaluation:    Plan of Care Reviewed With: patient   Pt met in the black at the start of the shift. Pt requested for his phone to retrieve some numbers and to confirm up coming appointment's dates. Pt presented as with flat and blunt affect and irritable mood. Pt has been visible in the milieu intermittently. Pt took a nap right after dinner and was up for hs medication at 2100. Prn ativan given per request for anxiety with good effect. Pt denies hallucination and other psych symptoms. No c/o pain.

## 2022-05-24 NOTE — PLAN OF CARE
"Pt asleep at start of shift, later woke to request ativan- when asked about the reason for the PRN he said \"sleep and agitation\" -given @0513. Pt voiced concerns of prolixin causing \"reverse withdrawal\" including feeling \"my brain shrinking due to dehydration\" and dry lips -encouraged to drink fluids and use vaseline on lips. Pt reports blisters on feet have resolved but continues to have foot pain from walking- declines PRN for this. Pt often comes to desk to add more information or ask questions including how many calories are burned walking 40 miles. Pt appeared to sleep 6 hours.    Problem: Sleep Disturbance  Goal: Adequate Sleep/Rest  Outcome: Ongoing, Progressing       "

## 2022-05-24 NOTE — PLAN OF CARE
Assessment/Intervention/Current Symptoms and Care Coordination    -checked in with the team    pt out for meals then he returned to bed.     -pt appeared to have slowed down. He was polite during our brief interaction      Discharge Plan or Goal  Return to home      Barriers to Discharge   Needs stabilization  Needs to attend to re-commit court process      Referral Status  None made      Legal Status  Pt is committed with Jo order
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
## 2022-05-24 NOTE — PLAN OF CARE
"Goal Outcome Evaluation:    Plan of Care Reviewed With: patient     Problem: Manic Behavior Episode  Goal: Decreased Manic Symptoms  Outcome: Ongoing, Progressing  Intervention: Promote Mood Equilibrium  Recent Flowsheet Documentation  Taken 5/24/2022 1230 by Kay Conner RN  Behavior Management: impulse control promoted  Supportive Measures: active listening utilized      Patient isolative in  his room early part of the shift. He slept through breakfast. He woke up at 1115 and requested for his breakfast. Patient did not eat  much of his breakfast because per patient that was not what he ordered. He had his morning medication with PRN Lorazepam at 1120. Patient appeared calm. No rambling noted. Quietly went back to his room after telling RN to wake him up for lunch. Denies all psychotic symptoms but complains of dehydration.  Patient came to RN at 1350 to say that he has been drinking lots of fluid but continues to feel dry. Said he will like IV infusion to bring his heart rate down. Patient stated that his HR is in the 110s and 120s . Despite his vital signs being checked few minutes earlier, RN rechecked his HR and it was 98. Patient then claimed that it just went down because he was meditating. RN encouraged patient to continue to meditate and drink more fluids since that is helping already. Patient noted to be pacing on the hallway with headphones. Was observed to make frequent stops at the desk to make one nonsensical comment or the other. \"I want to know what non-normadive behavior they are druggin me for now that I have gotten rid of erum for them.\" Patient still tangential with no insight into his condition.            "

## 2022-05-25 PROCEDURE — 124N000002 HC R&B MH UMMC

## 2022-05-25 PROCEDURE — 99233 SBSQ HOSP IP/OBS HIGH 50: CPT | Mod: GC | Performed by: PSYCHIATRY & NEUROLOGY

## 2022-05-25 PROCEDURE — 250N000013 HC RX MED GY IP 250 OP 250 PS 637: Performed by: STUDENT IN AN ORGANIZED HEALTH CARE EDUCATION/TRAINING PROGRAM

## 2022-05-25 RX ADMIN — FLUPHENAZINE HYDROCHLORIDE 15 MG: 5 SOLUTION, CONCENTRATE ORAL at 21:14

## 2022-05-25 RX ADMIN — LORAZEPAM 1 MG: 1 TABLET ORAL at 03:57

## 2022-05-25 RX ADMIN — LORAZEPAM 1 MG: 1 TABLET ORAL at 21:03

## 2022-05-25 ASSESSMENT — ACTIVITIES OF DAILY LIVING (ADL)
HYGIENE/GROOMING: INDEPENDENT
DRESS: INDEPENDENT
HYGIENE/GROOMING: INDEPENDENT
DRESS: INDEPENDENT
ORAL_HYGIENE: INDEPENDENT
ORAL_HYGIENE: INDEPENDENT
LAUNDRY: WITH SUPERVISION
LAUNDRY: WITH SUPERVISION

## 2022-05-25 NOTE — PLAN OF CARE
Problem: Behavioral Health Plan of Care  Goal: Adheres to Safety Considerations for Self and Others  Outcome: Ongoing, Progressing     Problem: Pain Acute  Goal: Acceptable Pain Control and Functional Ability  Outcome: Ongoing, Progressing     Problem: Suicidal Behavior  Goal: Suicidal Behavior is Absent or Managed  Outcome: Ongoing, Progressing   Goal Outcome Evaluation:      Pt denies pain and all psych symptoms. He is calm. He has been compliant with med. He claims that he has a good to tell people but it takes about 30 minutes to tell his history. He also  claims that he has baby face when he shave but he still refuses to shave this evening. Q 15 minutes safety check is ongoing per unit protocol.

## 2022-05-25 NOTE — PLAN OF CARE
"Pt asleep at start of shift, later up to request ativan for sleep disturbance- given @0655. Pt said \"don't worry I won't take all this ativan when I leave, I will not take anything by mouth, only needle\". Pt appreciative of care and returned to bed. Appeared to sleep 6 hours.    Problem: Manic Behavior Episode  Goal: Decreased Manic Symptoms  Outcome: Ongoing, Progressing     Problem: Sleep Disturbance  Goal: Adequate Sleep/Rest  Outcome: Ongoing, Progressing     "

## 2022-05-25 NOTE — PLAN OF CARE
Assessment/Intervention/Current Symptoms and Care Coordination     -checked in with the team     -pt out for meals then he returned to bed.      -pt appeared to have slowed down. He can be irritable at times        Discharge Plan or Goal  Return to home        Barriers to Discharge   Needs stabilization  Needs to attend to re-commit court process        Referral Status  None made        Legal Status  Pt is committed with Jo order

## 2022-05-25 NOTE — PLAN OF CARE
Problem: Manic Behavior Episode  Goal: Decreased Manic Symptoms  Outcome: Ongoing, Progressing  Note: Remains hyperverbal and tangential. Talking to all staff while at the desk with random Presybeterian references.. Exercises in his doorway. Spends time pacing in the black and listening to headphones. Did shower and get new scrubs this shift. He is cooperative taking medication. Also requested and given his prn Ativan for sleep.Joanna Fowler RN       Goal Outcome Evaluation:    Plan of Care Reviewed With: patient
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
## 2022-05-25 NOTE — PLAN OF CARE
"Patient slept in, woke up at about 1030 am irritated. Refused his am medication, asked for ativan for agitation , and will not alk about what was the cause of his agitated. While staff was trying to get the ativan out of pyxis patient said, forget it I do not want to be addicted to benzos anyway. He angrily walked out of the window.  Refused vital signs. Patient is labile. Dismissive, and blaunted.   Patient reported foot pain, patient said he is angry because doctors will not do anything about it. Patient is demanding that xray of his foot be done. Patient walks well, no sign of injury noted.    Patient continues to be dismissive  and blunted with assessment. Attempted to check in with patient, patient was using headphone and pacing in his room. Writer asked patient how he was doing, patient removed his headphone and replied \" I am over medicated, the doctors here a f**ked up. They do not listened to me\". Patient placed his head phone back on and started singing,\" I am not going to say anything anymore\". I have decided to keep quiet not act out and sing it out\". Patient walked away from staff. Later patient approached staff and continued to perseverate over increase dose of prolixin that makes him feel like \"shit\".     Patient asked for contacts for , numbers were provided by staff through Internet.     Noted to keep to himself. No interactions with peers was observed. Pacing hallway with head phones, listening to music. No out burst nor hypervabal behavior noted so far this shift.     "

## 2022-05-26 PROCEDURE — 99233 SBSQ HOSP IP/OBS HIGH 50: CPT | Mod: GC | Performed by: PSYCHIATRY & NEUROLOGY

## 2022-05-26 PROCEDURE — 124N000002 HC R&B MH UMMC

## 2022-05-26 PROCEDURE — 250N000013 HC RX MED GY IP 250 OP 250 PS 637: Performed by: STUDENT IN AN ORGANIZED HEALTH CARE EDUCATION/TRAINING PROGRAM

## 2022-05-26 RX ORDER — FLUPHENAZINE HYDROCHLORIDE 5 MG/ML
10 SOLUTION, CONCENTRATE ORAL 2 TIMES DAILY
Status: DISCONTINUED | OUTPATIENT
Start: 2022-05-27 | End: 2022-05-31

## 2022-05-26 RX ORDER — FLUPHENAZINE HYDROCHLORIDE 2.5 MG/ML
2.5 INJECTION, SOLUTION INTRAMUSCULAR 2 TIMES DAILY
Status: DISCONTINUED | OUTPATIENT
Start: 2022-05-27 | End: 2022-05-31

## 2022-05-26 RX ADMIN — OMEPRAZOLE 40 MG: 20 CAPSULE, DELAYED RELEASE ORAL at 12:13

## 2022-05-26 RX ADMIN — LORAZEPAM 1 MG: 1 TABLET ORAL at 21:06

## 2022-05-26 RX ADMIN — FLUPHENAZINE HYDROCHLORIDE 15 MG: 5 SOLUTION, CONCENTRATE ORAL at 21:06

## 2022-05-26 ASSESSMENT — ACTIVITIES OF DAILY LIVING (ADL)
ORAL_HYGIENE: INDEPENDENT
HYGIENE/GROOMING: INDEPENDENT
LAUNDRY: WITH SUPERVISION
LAUNDRY: WITH SUPERVISION
DRESS: INDEPENDENT
ORAL_HYGIENE: INDEPENDENT
DRESS: INDEPENDENT
HYGIENE/GROOMING: INDEPENDENT

## 2022-05-26 NOTE — PLAN OF CARE
Assessment/Intervention/Current Symptoms and Care Coordination    Attended team meeting and reviewed chart notes.    Pt sleeps until noon. He appears to have slowed down however still needs further stabilization.     Pt spend time listening to headphones and walking the black. He dis not attend groups this week.    Writer left JAYJAY Saini a message regarding discharge planning and IRTs placement.        Discharge Plan or Goal  IRTs  Placement      Barriers to Discharge   Needs stabilization      Referral Status  None made    Legal Status  Pt is committed with Jo order  He has been referred to [facility] for an extension of his commitment. Waiting for court dates and times.

## 2022-05-26 NOTE — DISCHARGE INSTRUCTIONS
Behavioral Discharge Planning and Instructions    Summary: You were admitted on 5/16/2022 due to Manic Symptomology.  You were treated by Dr. Swan and discharged on 7/1/2022 from station [facility] to Home    Main Diagnosis:   Bipolar I    Health Care Follow-up:   [facility]- [phone] or [phone] fax: [phone] [address] [facility] Christy Crooks July 28th @ 1:30 This will be a virtual appointment. If you want an in-person appointment please call to change.  Injection-Fluphenazine 50mg on Friday July 15th @ 1:45.         with [employer]: Yeny Robison [phone]      [facility] ([phone]) Day Treatment Intake: Thursday July 10th @ 10:00-this will be a phone call      Attend all scheduled appointments with your outpatient providers. Call at least 24 hours in advance if you need to reschedule an appointment to ensure continued access to your outpatient providers.     Major Treatments, Procedures and Findings:  You were provided with: a psychiatric assessment, assessed for medical stability, medication evaluation and/or management, and milieu management    Symptoms to Report: feeling more aggressive, increased confusion, losing more sleep, mood getting worse, or thoughts of suicide    Early warning signs can include: increased depression or anxiety sleep disturbances increased thoughts or behaviors of suicide or self-harm  increased unusual thinking, such as paranoia or hearing voices    Safety and Wellness:  Take all medicines as directed.  Make no changes unless your doctor suggests them.      Follow treatment recommendations.  Refrain from alcohol and non-prescribed drugs.  If there is a concern for safety, call 911.    Resources:   Crisis Intervention: 221.144.9093 or 721-413-5594 (TTY: 456.966.9466).  Call anytime for help.  National [place] on Mental Illness (www.[place].krista.org): 228.525.3885 or 148-547-4063.  National Suicide Prevention Line  "(www.mentalhealthmn.org): 787-860-XCMA (8255)  [facility] Crisis (COPE) Response - Adult 890 552-4943  Text 4 Life: txt \"LIFE\" to 92884 for immediate support and crisis intervention  Crisis text line: Text \"[place]\" to 546197. Free, confidential, 24/7.    General Medication Instructions:   See your medication sheet(s) for instructions.   Take all medicines as directed.  Make no changes unless your doctor suggests them.   Go to all your doctor visits.  Be sure to have all your required lab tests. This way, your medicines can be refilled on time.  Do not use any drugs not prescribed by your doctor.  Avoid alcohol.    Advance Directives:   Scanned document on file with [employer]? No scanned doc  Is document scanned? Pt states no documents  Honoring Choices Your Rights Handout: Informed and given  Was more information offered? Pt declined    The Treatment team has appreciated the opportunity to work with you. If you have any questions or concerns about your recent admission, you can contact the unit which can receive your call 24 hours a day, 7 days a week. They will be able to get in touch with a Provider if needed. The unit number is [phone].     "
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
## 2022-05-26 NOTE — PROGRESS NOTES
05/26/22 1800   Group Therapy Session   Group Attendance attended group session   Time Session Began 1705   Time Session Ended 1755   Total Time patient participated (minutes) 10   Total # Attendees 6   Group Type psychotherapeutic   Group Topic Covered emotions/expression;structured socialization   Group Session Detail process   Patient Response/Contribution confused;discussed personal experience with topic;disorganized;left the group on several occasions;verbalizations were off topic   Pt entered room making a strange breathing noise. When writer inquired he said he had just done a light workout and was catching his breath. He was having difficulty sitting still and excused himself and did not return to group.

## 2022-05-26 NOTE — PLAN OF CARE
"  Problem: Manic Behavior Episode  Goal: Decreased Manic Symptoms  Outcome: Ongoing, Progressing     Nursing Assessment    Recent Vitals: B/P:118/81  T:97.1  P:87     General Shift Summary  Pt slept until around 1030, but was visible on unit afterwards. Pt was social with peers. Pt had pressured speech and was restless. Pt spent time mostly pacing in black with headphones. Pt considered going to group, but declined OT group saying, \"I've already done every project.\"      Pt tells writer, \"I had a near death experience getting overdosed on haldol and the part I'm upset about is that I don't think the doctors take haldol themselves, and I think they are giving it to veterans. So a homeless lesa showed me how to break into [facility] and I've been breaking in there and investigating doctors for the past twenty years.\" Pt then says, \"even after I tell you a crazy thing like that do I seem manic or high energy to you?\" Writer told pt that they have only observed them for a short time today and therefore do not have an opinion to that. Pt then says, \"thank you for being honest, the doctors have assessed me only for about 8 minutes since I've been here and they diagnosed me. They don't know anything, they don't listen to me.\" Pt continued to perseverate and swear about doctors but has been pleasant and polite with staff and peers. Pt perseverating on not being manic.     Pt requested spiritual health consult but tells writer, \"make sure they don't bring any books.\"    Patient is medication compliant this shift. States that they are being overmedicated on psych meds and that they are too foggy.     Hygiene and appetite are appropriate. Pt was happy they had ensure shakes ordered.     Chidi Milton RN    "

## 2022-05-26 NOTE — PLAN OF CARE
Problem: Sleep Disturbance  Goal: Adequate Sleep/Rest  Outcome: Ongoing, Progressing  Intervention: Promote Sleep/Rest  Flowsheets (Taken 5/26/2022 0621)  Sleep/Rest Enhancement: regular sleep/rest pattern promoted     Patient slept 6.75 hours this shift. He was only awake x1 to the bathroom but went back easily to bed. No behavioral concerns noted. No PRN medications given.

## 2022-05-26 NOTE — PROGRESS NOTES
CLINICAL NUTRITION SERVICES - BRIEF NOTE     Nutrition Prescription    RECOMMENDATIONS FOR MDs/PROVIDERS TO ORDER:  Please consult if further nutrition needs arise.    Recommendations already ordered by Registered Dietitian (RD):  Ensure Enlive BID @10AM and 2PM snack (alternate flavors).       REASON FOR ASSESSMENT  Sam Minor is a/an 37 year old male assessed by the dietitian for Patient/Family Request -- Stating he needs to discuss options for in between snacks    PMH  Sam Minor is a 37 year old male with previous psychiatric diagnoses of schizoaffective disorder bipolar type and substance use disorder who presents with erum (delusions of grandeur, Catholic delusions, pressured speech, hypersexuality, and disinhibition) and psychosis (auditory hallucinations) in the context of tapering his Prolixin. Assessment is that the current presentation is consistent with schizoaffective disorder bipolar type, current erum and psychosis.    EVALUATION OF THE PROGRESS TOWARD GOALS   Diet: Regular Diet  Intake: eating well, per patient     NEW FINDINGS   Patient reports typically eating 6-7 small meals at home. He feels frustrated that he has to eat 3 big meals during admission because it is too much food at once and he is hungry between meals. He reports his typical weight as 160#. He reports losing 27# within the last year. Per chart review he has lost 5.4kg (~12#, 7%) x1 year.    Sam reports he feels like he doesn't belong here on this unit and that he's here by mistake. He describes his food as feeding his temple. He feels like he is burning thousands of calories a day because of how much he is walking up and down the hallway of the unit. Discussed oral supplement options.    Weight:  Wt Readings from Last 10 Encounters:   05/17/22 71.7 kg (158 lb)   05/10/22 69 kg (152 lb 3.2 oz)   07/20/21 75.3 kg (166 lb)   06/28/21 77.1 kg (170 lb)   01/27/19 78.8 kg (173 lb 12.8 oz)     INTERVENTIONS  Ensure  Enlive BID @10AM and 2PM snack (alternate flavors).    Monitoring/Evaluation  Progress toward goals will be monitored and evaluated per protocol.     Gregoria Salgado, MPH, RDN, LD  Behavioral Clinical Dietitian  Mental Health Pager (M-F): [phone]  On Call Pager (weekends only): [phone]

## 2022-05-27 PROCEDURE — 99233 SBSQ HOSP IP/OBS HIGH 50: CPT | Mod: GC | Performed by: PSYCHIATRY & NEUROLOGY

## 2022-05-27 PROCEDURE — 250N000013 HC RX MED GY IP 250 OP 250 PS 637: Performed by: STUDENT IN AN ORGANIZED HEALTH CARE EDUCATION/TRAINING PROGRAM

## 2022-05-27 PROCEDURE — 124N000002 HC R&B MH UMMC

## 2022-05-27 RX ADMIN — LORAZEPAM 1 MG: 1 TABLET ORAL at 03:32

## 2022-05-27 RX ADMIN — FLUPHENAZINE HYDROCHLORIDE 10 MG: 5 SOLUTION, CONCENTRATE ORAL at 08:40

## 2022-05-27 RX ADMIN — OMEPRAZOLE 40 MG: 20 CAPSULE, DELAYED RELEASE ORAL at 08:40

## 2022-05-27 RX ADMIN — FLUPHENAZINE HYDROCHLORIDE 10 MG: 5 SOLUTION, CONCENTRATE ORAL at 20:43

## 2022-05-27 RX ADMIN — LORAZEPAM 1 MG: 1 TABLET ORAL at 20:43

## 2022-05-27 ASSESSMENT — ACTIVITIES OF DAILY LIVING (ADL)
LAUNDRY: WITH SUPERVISION
ORAL_HYGIENE: INDEPENDENT
HYGIENE/GROOMING: INDEPENDENT
DRESS: INDEPENDENT
HYGIENE/GROOMING: INDEPENDENT
DRESS: INDEPENDENT
ORAL_HYGIENE: INDEPENDENT
LAUNDRY: WITH SUPERVISION

## 2022-05-27 NOTE — PLAN OF CARE
Assessment/Intervention/Current Symptoms and Care Coordination    Attended team meeting and reviewed chart notes.    Writer spoke with pt's JAYJAY Saini. Yeny reports that the family needs to see drastic changes in order for pt to return home. They would like him to have an ACT team. Pt was assigned an ACT team in the past however did not follow through with the referral. He ended up returning to Dr. Crooks. Yeny stated that pt can bargain, negotiate with Dr. Crooks to change his medication regimen. She also stated that pt was disruptive in the doctors office until he gets his way. Pt does have a brother who Yeny thought might be helpful in discharge planning. Yeny and I both agreed that pt's parents need to tell pt he can no longer come home. They are in their late 60's and can no longer care for him.      Discharge Plan or Goal  Discharge to IRTs      Barriers to Discharge   Needs stabilization      Referral Status  None made      Legal Status  Pt is committed with a Jo order- He is in the process of getting recommitted with [facility].

## 2022-05-27 NOTE — PLAN OF CARE
"Goal Outcome Evaluation:    Plan of Care Reviewed With: patient        Problem: Cognitive Impairment (Psychotic Signs/Symptoms)  Goal: Optimal Cognitive Function (Psychotic Signs/Symptoms)  Outcome: Ongoing, Progressing     Problem: Manic Behavior Episode  Goal: Decreased Manic Symptoms  Intervention: Promote Mood Equilibrium  Recent Flowsheet Documentation  Taken 5/27/2022 0900 by Kay Conner RN  Behavior Management: impulse control promoted  Supportive Measures: active listening utilized     Patient woke up for breakfast. Appeared calm this morning. Less talking noted. Patient agreed to vital signs check. Patient asked for his morning medications. Patient not happy when realized that he will get am Prolixin. \"If I refuse this I will get the needle right? RN answered in affirmative. Patient just cursed and took the medications. Patient has taken his medication before reported that \"This is a downer. Prolixin is a downer.\" Patient complained to a Tech that he was backed up. RN approached patient who denied and said that he only missed a day of bowel movement. He said that he was not worried about that now.   Patient came out of this room at 1120 fully agitated. Was on the phone blowing out curse words telling the person the other end that he need a . Patient was redirected.  After the phone call, patient approached the nurses' desk and said that he is fully an orphan now. \"I disowned my parents and I'm ready for a new relationship. Patient started pacing back and forth the hallway wearing headphone. Was demonstrating rapid tangential speech. \"The doctor is causing me to have suicidal thought, racing thoughts because he is making me take Prolixin.\" Patient claimed that he did not have those things before. \"I can't even jay him because he has so much malpractice insurance.\"  When RN asked who is \"Him,\" patient responded, \"The doctor ma'am. Pay attention.\" RN offered to administer Lorazepam but patient " "declined. Patient reported having four normal bowel movements.  Patient met with his team today. After the meeting patient said \"It seems I am making progress with my doctors. They are actually listening to me.\" Patient was calm the rest of the shift. He approached several staff to say that he would like to tell them his story. Patient said \"I will need a good 20/30 minutes of your time to tell you my story.\" Will continue to monitor and redirect.          "
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
## 2022-05-27 NOTE — PLAN OF CARE
Occupational Therapy Group Note      05/27/22 1525   Group Therapy Session   Group Attendance attended group session   Total Time patient participated (minutes) 30   Group Type expressive therapy   Group Topic Covered coping skills/lifestyle management;emotions/expression;leisure exploration/use of leisure time   Group Session Detail Topic: OT Open Clinic for creative expression, promoting autonomy, building sense of self-worth, coping with stress/symptoms and opportunity to exercise cognitive skills (i.e. initiation, planning, organization, sequencing, sustained attention, follow-through, termination of task and overall safety awareness).   Patient Response/Contribution cooperative with task    Pt joined group, stating he wanted to try something different since he has been focusing on walking and that he walks 15 miles/day in the hospital.  Pt was able to select a project with support and work on it for ~25 minutes.  While working on his project, pt was quiet and focused.  He abandoned the project before finishing and said he did not plan to come back to group while he is in the hospital.  Pt was respectful towards staff and peers while in group.  Presentation: grandiose, pressured speech, pleasant, bright, excessive.

## 2022-05-27 NOTE — PLAN OF CARE
Problem: Sleep Disturbance  Goal: Adequate Sleep/Rest  Outcome: Ongoing, Progressing   Goal Outcome Evaluation:    Pt was observed sleeping at the start of the shift. Even and  un-labored respirations noted during q15 minutes rounds. Pt slept for 5.75  hours. 1 Mg Ativan given at 0332 for sleep. No behavioral concerns noted this shift.
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
## 2022-05-27 NOTE — PROGRESS NOTES
Occupational Therapy    Patient admitted on 5/16/21 and has been invited to engage in Occupational Therapy interventions.  Participation has not yet been established.      Once patient can tolerate group participation, OT will follow up with assessment and address any questions, needs, or concerns patient may have.  Care plan initiated - Current goal is to establish engagement in OT groups that support recovery.      Gaby Caal, CARIDADR/AVILA  5/27/2022

## 2022-05-28 PROCEDURE — H2032 ACTIVITY THERAPY, PER 15 MIN: HCPCS

## 2022-05-28 PROCEDURE — 124N000002 HC R&B MH UMMC

## 2022-05-28 PROCEDURE — 250N000013 HC RX MED GY IP 250 OP 250 PS 637: Performed by: STUDENT IN AN ORGANIZED HEALTH CARE EDUCATION/TRAINING PROGRAM

## 2022-05-28 RX ADMIN — LORAZEPAM 1 MG: 1 TABLET ORAL at 13:16

## 2022-05-28 RX ADMIN — LORAZEPAM 1 MG: 1 TABLET ORAL at 19:53

## 2022-05-28 RX ADMIN — LORAZEPAM 1 MG: 1 TABLET ORAL at 08:21

## 2022-05-28 RX ADMIN — FLUPHENAZINE HYDROCHLORIDE 10 MG: 5 SOLUTION, CONCENTRATE ORAL at 19:52

## 2022-05-28 RX ADMIN — FLUPHENAZINE HYDROCHLORIDE 10 MG: 5 SOLUTION, CONCENTRATE ORAL at 08:21

## 2022-05-28 RX ADMIN — OMEPRAZOLE 40 MG: 20 CAPSULE, DELAYED RELEASE ORAL at 08:21

## 2022-05-28 NOTE — PLAN OF CARE
"  Problem: Manic Behavior Episode  Goal: Decreased Manic Symptoms  Outcome: Ongoing, Progressing   Nursing Assessment    Recent Vitals: B/P:119/79  T:98.4  P:72     General Shift Summary  Visible in milieu, pleasant and social with peers and staff. Pt did not have any behavioral outbursts this shift. Pt had slightly elevated mood. Pt continues to talk about ASMR and their energy and behavior being cause by sugar being released into their blood depending on how much they exercise. Pt does not appear to be responding. Manic sx seem to be improving.     Patient is medication compliant and reported no side effects this shift. When given prolixin and told it's only a 10mg dose since they already had a 10mg dose during the day the pt says, \"good so he's not an idiot, he just doesn't know me\" in reference to their doctor.  side effects are. PRN ativan given with HS med to aid sleep.    Hygiene and appetite are appropriate.     Chidi Milton RN    "

## 2022-05-28 NOTE — PLAN OF CARE
Problem: Sleep Disturbance  Goal: Adequate Sleep/Rest  Outcome: Ongoing, Progressing   Goal Outcome Evaluation:    Pt slept for the most part of the night, up x 1 to use the bathroom.  Pt appeared to have slept for 5.50  hours. No behavioral concerns observed or reported during this shift,

## 2022-05-28 NOTE — PLAN OF CARE
Problem: Cognitive Impairment (Psychotic Signs/Symptoms)  Goal: Optimal Cognitive Function (Psychotic Signs/Symptoms)  Outcome: Ongoing, Progressing  Intervention: Support and Promote Cognitive Ability  Recent Flowsheet Documentation  Taken 5/28/2022 1100 by Freddy Dias RN  Trust Relationship/Rapport:    care explained    choices provided    questions answered   Goal Outcome Evaluation:    Plan of Care Reviewed With: patient      Patient out ealier in the shift. Requested and received his morning medication with prn Ativan. Ate breakfast and went right back to his room. Listening to music. Patient received Ativan twice per his request this shift, though he denied anxiety and no agitation was noted. He is medication compliant. Thoughts are organized and coherent. Patient spend most of the shift in his room lying down listening to music. Patient said he worked out a lot yesterday and plans to sleep today to rest his sore muscles. Reported 200+ pushups. States he might go to groups later if there is any group later today. Patient said he is feeling better today. Patient acknowledged improvement compared to the time he was admitted. Coherent and verbalized understanding of his medication(prolixin) management.   Patient denies all psych symptoms and contracted for safety. Calm, cooperative and pleasant this shift. No verbal outburst.  Staff will continue to monitor.

## 2022-05-29 PROCEDURE — 250N000013 HC RX MED GY IP 250 OP 250 PS 637: Performed by: STUDENT IN AN ORGANIZED HEALTH CARE EDUCATION/TRAINING PROGRAM

## 2022-05-29 PROCEDURE — 124N000002 HC R&B MH UMMC

## 2022-05-29 RX ADMIN — LORAZEPAM 1 MG: 1 TABLET ORAL at 19:00

## 2022-05-29 RX ADMIN — OMEPRAZOLE 40 MG: 20 CAPSULE, DELAYED RELEASE ORAL at 11:03

## 2022-05-29 RX ADMIN — FLUPHENAZINE HYDROCHLORIDE 10 MG: 5 SOLUTION, CONCENTRATE ORAL at 11:04

## 2022-05-29 RX ADMIN — LORAZEPAM 1 MG: 1 TABLET ORAL at 11:03

## 2022-05-29 RX ADMIN — FLUPHENAZINE HYDROCHLORIDE 10 MG: 5 SOLUTION, CONCENTRATE ORAL at 19:00

## 2022-05-29 RX ADMIN — LORAZEPAM 1 MG: 1 TABLET ORAL at 23:05

## 2022-05-29 RX ADMIN — LORAZEPAM 1 MG: 1 TABLET ORAL at 04:20

## 2022-05-29 ASSESSMENT — ACTIVITIES OF DAILY LIVING (ADL)
HYGIENE/GROOMING: INDEPENDENT
DRESS: INDEPENDENT
ORAL_HYGIENE: INDEPENDENT
LAUNDRY: UNABLE TO COMPLETE
LAUNDRY: WITH SUPERVISION
DRESS: INDEPENDENT
HYGIENE/GROOMING: INDEPENDENT
ORAL_HYGIENE: INDEPENDENT

## 2022-05-29 NOTE — PLAN OF CARE
Problem: Behavioral Health Plan of Care  Goal: Plan of Care Review  Outcome: Ongoing, Progressing   Goal Outcome Evaluation:    Pt has appeared asleep since the beginning of the shift. Q15 minutes round completed throughout the shift. Pt was awake at 0400 he requested and received 1 Mg Ativan at 0420. Pt retired back to his room right after,and was observed sleeping after 30 minutes. Pt appeared to have slept for a total of 6 hours this shift. No behavioral concerns noted or reported.

## 2022-05-29 NOTE — PLAN OF CARE
"   05/28/22 2100   Group Therapy Session   Group Attendance attended group session   Time Session Began 1715   Time Session Ended 1810   Total Time patient participated (minutes) 30   Total # Attendees 4   Group Type expressive therapy  (art therapy)   Group Topic Covered emotions/expression   Group Session Detail image collage   Patient Response/Contribution organized;cooperative with task;discussed personal experience with topic   Patient Response Detail Sam appeared alert and cooperative. He participated in making a collage of words and images that resonate with him starting with the word \"Haven.\" He shared with the group and talked about wanting to purchase a house some day and have his own \"safe haven.\" He interacted appropriately with peers.   Goal Outcome Evaluation:                      "

## 2022-05-29 NOTE — PLAN OF CARE
Problem: Behavioral Health Plan of Care  Goal: Plan of Care Review  Outcome: Ongoing, Progressing     Problem: Manic Behavior Episode  Goal: Decreased Manic Symptoms  Outcome: Ongoing, Progressing   Goal Outcome Evaluation:    Pt visible in the unit, social with staff and peers. Pt mood calm, paced the hallway back and forth with headphone listening to music. Pt denies anxiety, depression and all other MH Sx. Pt appetite are appropriate ate most of his dinner with adequate fluid intake. Pt took hs medication at 2000 with PRN Ativan 1 Mg. No behavioral concerns noted this shift.

## 2022-05-29 NOTE — PLAN OF CARE
Problem: Manic Behavior Episode  Goal: Decreased Manic Symptoms  Outcome: Ongoing  Intervention: Promote Mood Equilibrium  Recent Flowsheet Documentation  Taken 5/29/2022 1300 by Petra Toribio RN  Behavior Management:    impulse control promoted    boundaries reinforced  Sensory Stimulation Regulation: quiet environment promoted  Supportive Measures:    active listening utilized    positive reinforcement provided    self-care encouraged    self-responsibility promoted    verbalization of feelings encouraged    self-reflection promoted    relaxation techniques promoted   Goal Outcome Evaluation:    Pt isolative to his room for the majority of the morning. Pt ate his lunch and was visible in the milieu following. Pt took scheduled medications without issue. Requested and given Ativan prn for agitation. He displays hyper verbal speech at times, but is able to communicate needs appropriately. Denies mental health symptoms, stating that his behavior is due to drinking a pint of vodka and 12 Mountain Dew each day and not sleeping for 36 hours. Pt will subsequently walk for 20 miles each day, which he feels releases a toxic level of sugar that negatively impacts his mood. No safety concerns at this time. Will continue to assess and offer support.

## 2022-05-30 PROCEDURE — 250N000013 HC RX MED GY IP 250 OP 250 PS 637: Performed by: STUDENT IN AN ORGANIZED HEALTH CARE EDUCATION/TRAINING PROGRAM

## 2022-05-30 PROCEDURE — 124N000002 HC R&B MH UMMC

## 2022-05-30 RX ADMIN — OMEPRAZOLE 40 MG: 20 CAPSULE, DELAYED RELEASE ORAL at 10:06

## 2022-05-30 RX ADMIN — FLUPHENAZINE HYDROCHLORIDE 10 MG: 5 SOLUTION, CONCENTRATE ORAL at 10:02

## 2022-05-30 RX ADMIN — FLUPHENAZINE HYDROCHLORIDE 10 MG: 5 SOLUTION, CONCENTRATE ORAL at 19:24

## 2022-05-30 RX ADMIN — LORAZEPAM 1 MG: 1 TABLET ORAL at 19:24

## 2022-05-30 ASSESSMENT — ACTIVITIES OF DAILY LIVING (ADL)
LAUNDRY: WITH SUPERVISION
HYGIENE/GROOMING: INDEPENDENT
LAUNDRY: WITH SUPERVISION
ORAL_HYGIENE: INDEPENDENT
HYGIENE/GROOMING: INDEPENDENT
DRESS: INDEPENDENT
DRESS: INDEPENDENT
ORAL_HYGIENE: INDEPENDENT
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
## 2022-05-30 NOTE — PROGRESS NOTES
"Patient engaged RN for more than 20 minutes in the story of his life. Patient started by asking RN if RN wants truth or beautiful and the he said, \"Truth is beautiful.\" He stated that he was chosen by god to spread the truth. Per patient his story started when he was 17 and applying to go into the marine. \"I chose marijuana instead of gun\" he said. He stated that he is not mentally ill. He had TBI from [place]. 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 At a point patient said that our conversation is being recorded now by the satellite. Patient wants RN to look up and read about \"ASMR\", sensory energy that connects him with god. He wants RN to find out more about \"trigger test\" and listen to two songs at once which he said he did for five years. Patient demonstrated pressured speech, grandiosity, and lack of insight to his condition. However, he was happy that RN listened to him with open mind.   "

## 2022-05-30 NOTE — PLAN OF CARE
Problem: Sleep Disturbance  Goal: Adequate Sleep/Rest  Outcome: Ongoing, Progressing   Goal Outcome Evaluation:    Pt has appeared asleep since the start of the shift. Regular and unlabored respirations noted during q15 minutes rounds. No PRN given or requested this shift. Pt slept for 7 hours. No behavioral concerns noted.

## 2022-05-30 NOTE — PLAN OF CARE
"Goal Outcome Evaluation:    Plan of Care Reviewed With: patient        Problem: Behavioral Health Plan of Care  Goal: Optimized Coping Skills in Response to Life Stressors  Intervention: Promote Effective Coping Strategies  Recent Flowsheet Documentation  Taken 5/30/2022 0900 by Kya Conner RN  Supportive Measures:   active listening utilized   positive reinforcement provided   self-care encouraged   self-responsibility promoted   verbalization of feelings encouraged   self-reflection promoted   relaxation techniques promoted     Patient slept in. Did not get up for breakfast. Morning medications served in bed. Patient appeared calm and compliant with his medications. Denied anxiety and depression. Denied SI/HI and hallucinations stating, \"Everything checked out. May be I will tell you my story today\" he added. No behavior issues at this time. Patient denied pain.          "

## 2022-05-30 NOTE — PLAN OF CARE
"  Problem: Manic Behavior Episode  Goal: Decreased Manic Symptoms  Outcome: Ongoing, Progressing    Nursing Assessment    Recent Vitals: B/P:107/73  T:97.3  P:99     General Shift Summary  Pt more isolative to room this shift. Pt had no behavioral issues. Denies MH sx. Remains somewhat pressured and grandiose although is polite and pleasant. Pt did not have any delusional conversations with writer this shift. Still lacking insight into illness.     Patient is medication compliant and reported no side effects. PRN Ativan given x2 for sleep. Pt reports that this is effective for them and that they only need to use this when in the hospital. Writer attempted to have conversation with pt about other ways to manage sleep due to Ativan being habit forming and potentially addictive if taken regularly for long period of time. Pt stated that they were an, \"addiction expert\" and that this would not be a problem for them but thanked writer for their concern.     Hygiene and appetite are appropriate.     Chidi Milton RN    "

## 2022-05-31 LAB — SARS-COV-2 RNA RESP QL NAA+PROBE: NEGATIVE

## 2022-05-31 PROCEDURE — 99233 SBSQ HOSP IP/OBS HIGH 50: CPT | Mod: GC | Performed by: PSYCHIATRY & NEUROLOGY

## 2022-05-31 PROCEDURE — 124N000002 HC R&B MH UMMC

## 2022-05-31 PROCEDURE — 250N000013 HC RX MED GY IP 250 OP 250 PS 637: Performed by: STUDENT IN AN ORGANIZED HEALTH CARE EDUCATION/TRAINING PROGRAM

## 2022-05-31 PROCEDURE — U0005 INFEC AGEN DETEC AMPLI PROBE: HCPCS | Performed by: STUDENT IN AN ORGANIZED HEALTH CARE EDUCATION/TRAINING PROGRAM

## 2022-05-31 RX ORDER — FLUPHENAZINE DECANOATE 25 MG/ML
25 INJECTION, SOLUTION INTRAMUSCULAR; SUBCUTANEOUS
Status: DISCONTINUED | OUTPATIENT
Start: 2022-06-06 | End: 2022-06-20

## 2022-05-31 RX ORDER — FLUPHENAZINE HYDROCHLORIDE 5 MG/ML
15 SOLUTION, CONCENTRATE ORAL AT BEDTIME
Status: DISCONTINUED | OUTPATIENT
Start: 2022-06-01 | End: 2022-07-01 | Stop reason: HOSPADM

## 2022-05-31 RX ORDER — FLUPHENAZINE HYDROCHLORIDE 5 MG/ML
5 SOLUTION, CONCENTRATE ORAL 2 TIMES DAILY
Status: COMPLETED | OUTPATIENT
Start: 2022-05-31 | End: 2022-05-31

## 2022-05-31 RX ORDER — FLUPHENAZINE HYDROCHLORIDE 2.5 MG/ML
2.5 INJECTION, SOLUTION INTRAMUSCULAR 2 TIMES DAILY
Status: COMPLETED | OUTPATIENT
Start: 2022-05-31 | End: 2022-05-31

## 2022-05-31 RX ORDER — FLUPHENAZINE HYDROCHLORIDE 2.5 MG/ML
2.5 INJECTION, SOLUTION INTRAMUSCULAR AT BEDTIME
Status: DISCONTINUED | OUTPATIENT
Start: 2022-06-01 | End: 2022-07-01 | Stop reason: HOSPADM

## 2022-05-31 RX ADMIN — FLUPHENAZINE HYDROCHLORIDE 5 MG: 5 SOLUTION, CONCENTRATE ORAL at 21:20

## 2022-05-31 RX ADMIN — LORAZEPAM 1 MG: 1 TABLET ORAL at 21:19

## 2022-05-31 RX ADMIN — FLUPHENAZINE HYDROCHLORIDE 10 MG: 5 SOLUTION, CONCENTRATE ORAL at 08:43

## 2022-05-31 RX ADMIN — OMEPRAZOLE 40 MG: 20 CAPSULE, DELAYED RELEASE ORAL at 08:43

## 2022-05-31 RX ADMIN — LORAZEPAM 1 MG: 1 TABLET ORAL at 06:55

## 2022-05-31 RX ADMIN — LORAZEPAM 1 MG: 1 TABLET ORAL at 17:15

## 2022-05-31 RX ADMIN — LORAZEPAM 1 MG: 1 TABLET ORAL at 13:10

## 2022-05-31 ASSESSMENT — ACTIVITIES OF DAILY LIVING (ADL)
DRESS: INDEPENDENT
HYGIENE/GROOMING: INDEPENDENT
ORAL_HYGIENE: INDEPENDENT
DRESS: INDEPENDENT
HYGIENE/GROOMING: INDEPENDENT
ORAL_HYGIENE: INDEPENDENT
LAUNDRY: WITH SUPERVISION
LAUNDRY: WITH SUPERVISION

## 2022-05-31 NOTE — PLAN OF CARE
"Goal Outcome Evaluation:    Plan of Care Reviewed With: patient        Problem: Behavioral Health Plan of Care  Goal: Optimized Coping Skills in Response to Life Stressors  Intervention: Promote Effective Coping Strategies  Recent Flowsheet Documentation  Taken 5/31/2022 1721 by Kay Conner RN  Supportive Measures: active listening utilized  Taken 5/31/2022 1000 by Kay Conner RN  Supportive Measures: active listening utilized     Patient became agitated towards the end of previous shift. Was seen pacing back and forth on the hallway wearing headphones. Patient made stops at the desk and each time patient will say one thing or the other. Sam reported to RN that he was agitated. At this time it was not time for another dose of Lorazepam. He asked, \"If I walk till I brake my foot do I have to go to a real part of the hospital?\" Having asked the question, patient walked away and came back to say \"What irritates me is that I have knowledge of my condition and it is a rare condition. My doctors misdiagnosed me.\" Sam then requested for another dose of Lorazepam. When RN informed him that it was too early for another dose. Patient stated that RN should slap him with another dose whenever it is due. He later calmed himself down but maintained intermittent pacing. PRN Lorazepam administered at 1715. Patient went back to his room but asked RN to wake him up for dinner. He had dinner. He was compliant with his night medication. A second dose of PRN Lorazepam administered at 2119. No medication cheeking observed.         "

## 2022-05-31 NOTE — PLAN OF CARE
05/31/22 1620   Interprofessional Rounds   Participants psychiatrist;CTC;nursing   Team Discussion   Progress not adequate for discharge   Continued Stay Criteria/Rationale symptoms of erum / psychosis present requiring ongoing treatment and assessment.   Medical/Physical none discussed in team   Precautions see below   Plan continue with medication managment and assessment per psychiatry. recommitment / Jo process is ongoing.   Rationale for change in precautions or plan per ongoing assessment   Safety Plan see below precautions   Anticipated Discharge Disposition IRTS     PRECAUTIONS AND SAFETY    Behavioral Orders   Procedures    Assault precautions    Cheeking Precautions (behavioral units)     Patient Observed swallowing PO medications; Patient asked to drink water after swallowing medication; Patient in Staff line of sight for 15 minutes after medication given; Mouth checks after PO administration (patient asked to open mouth and stick out their tongue).    Code 1 - Restrict to Unit    Routine Programming     As clinically indicated    Sexual precautions    Status 15     Every 15 minutes.       Safety  Safety WDL: WDL  Patient Location: patient room, own  Observed Behavior: calm  Observed Behavior (Comment): calm  Safety Measures: safety rounds completed  Diversional Activity: music  Suicidality: Status 15  Assault: status 15  Sexual: status 15

## 2022-05-31 NOTE — PLAN OF CARE
Goal Outcome Evaluation:    Plan of Care Reviewed With: patient        Problem: Manic Behavior Episode  Goal: Decreased Manic Symptoms  Outcome: Ongoing, Progressing  Intervention: Promote Mood Equilibrium  Recent Flowsheet Documentation  Taken 5/31/2022 1000 by Kay Conner RN  Behavior Management:    impulse control promoted    boundaries reinforced  Supportive Measures: active listening utilized     Patient resting in his room. Refused breakfast. Compliant with his morning medications. Denied psych symptoms. No behaviors  this shift. Finally out of his room at midday for lunch. Seen pacing back and forth waiting for lunch arrival. Compliant with vital signs check. Patient less manic and appropriate.

## 2022-05-31 NOTE — PLAN OF CARE
"Pt appeared to sleep 7 hours. Pt up this am saying \"I didn't get any REM sleep so I need the ativan to balance me\" -given PRN ativan for anxiety. Compliant with cheeking precautions.     Problem: Sleep Disturbance  Goal: Adequate Sleep/Rest  Outcome: Ongoing, Progressing     "
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
## 2022-05-31 NOTE — PLAN OF CARE
Problem: Manic Behavior Episode  Goal: Decreased Manic Symptoms  Outcome: Ongoing, Progressing   Goal Outcome Evaluation:    Plan of Care Reviewed With: patient     Nursing Assessment    Recent Vitals: B/P:108/76  T:97.6  P:76     General Shift Summary  Pt isolative to room majority of shift. Pt reports they are doing good and deny MH sx. Mood seems mildly elevated. Pt continues to be somewhat pressured and grandiose, although seems to be improving. Pt pleasant and appropriate in all interactions. No behavioral concerns this shift. Does not appear to be responding.    Patient is medication compliant and denies experiencing any side effects. Pt does however make comments about being attacked by medicine and medications being poison, although it seemed pt was attempting sarcastic humor rather than being paranoid. PRN Ativan given as sleep aid.     Hygiene and appetite are adequate    Chidi Milton RN

## 2022-05-31 NOTE — PLAN OF CARE
Assessment/Intervention/Current Symptoms and Care Coordination  - chart review  - team meeting  - post team rounds team meeting / discussion  - Behavioral Team Discussion Note completed for weekly plan of care update.      - spoke with Petra BUSTILLOS - [facility] Attorney's Office - confirmed that patient is still on station [facility] and no discharge date determined yet. She is preparing documents / orders for recommitment hearing. Also wanted to make sure to inform hospital that his last commitment was dually to [facility] and [facility]. As we filed a Jo with recommitment - new commitment would be dually to [facility] and [facility] (if outcome of court process is order for commitment)       Discharge Plan or Goal  Pending stabilization & development of a safe discharge plan.  Considerations include: Intensive Residential Treatment Services (IRTS): -      Barriers to Discharge   Patient requires further psychiatric stabilization due to current symptomology      Referral Status  None made      Legal Status  Pt is committed with Jo order- His CM is filing for recommitment. He has been screened by [place] [facility] but no court date.

## 2022-06-01 PROCEDURE — 99233 SBSQ HOSP IP/OBS HIGH 50: CPT | Mod: GC | Performed by: PSYCHIATRY & NEUROLOGY

## 2022-06-01 PROCEDURE — 250N000013 HC RX MED GY IP 250 OP 250 PS 637: Performed by: STUDENT IN AN ORGANIZED HEALTH CARE EDUCATION/TRAINING PROGRAM

## 2022-06-01 PROCEDURE — 124N000002 HC R&B MH UMMC

## 2022-06-01 PROCEDURE — G0177 OPPS/PHP; TRAIN & EDUC SERV: HCPCS

## 2022-06-01 RX ADMIN — OMEPRAZOLE 40 MG: 20 CAPSULE, DELAYED RELEASE ORAL at 09:01

## 2022-06-01 RX ADMIN — FLUPHENAZINE HYDROCHLORIDE 15 MG: 5 SOLUTION, CONCENTRATE ORAL at 20:18

## 2022-06-01 ASSESSMENT — ACTIVITIES OF DAILY LIVING (ADL)
HYGIENE/GROOMING: INDEPENDENT
HYGIENE/GROOMING: INDEPENDENT
LAUNDRY: WITH SUPERVISION
DRESS: INDEPENDENT
ORAL_HYGIENE: INDEPENDENT
LAUNDRY: WITH SUPERVISION
DRESS: INDEPENDENT
ORAL_HYGIENE: INDEPENDENT

## 2022-06-01 NOTE — PLAN OF CARE
Pt appeared to sleep 7 hours. No PRNs given or requested. No medical or behavioral events this shift.      Problem: Sleep Disturbance  Goal: Adequate Sleep/Rest  Outcome: Ongoing, Progressing

## 2022-06-01 NOTE — PLAN OF CARE
Goal Outcome Evaluation:    Plan of Care Reviewed With: patient      Patient pacing hallway with headphone, denies all psych symptoms but noted to be hyper verbal and manic. He is labile. Continues to perseverate over discharge and Prolixin medication. States he does not have mental illness and not sure why providers are still keeping him in the hospital. Patient aware of his discharge plans in regards to Prolixin adjustments. He is medication compliant. No request for ativan this shift.Sam reports having trouble sleeping and irregular bowel movement attributing it to being trapped in the hospital and not getting enough exercise. Offered patient interventions he declined. Denies any constipations.   Patient continues to complain of knee pain and sore feet from working. Said his shoes were taken away from him. Patient was informed that he can wear his shoes but not with laces he declined. Patient walked away from staff during check in, said writer does not believe him when he says he does not have any mental illness. Staff will continue to monitor.
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
## 2022-06-01 NOTE — PLAN OF CARE
Assessment/Intervention/Current Symptoms and Care Coordination  - chart review  - team meeting  - post team rounds team meeting / discussion       Discharge Plan or Goal  Pending stabilization & development of a safe discharge plan.  Considerations include: Intensive Residential Treatment Services (IRTS): -        Barriers to Discharge   Patient requires further psychiatric stabilization due to current symptomology        Referral Status  None made         Legal Status  Pt is committed with Jo order- His CM is filing for recommitment. He has been screened by [place] [facility] but no court date.

## 2022-06-02 PROCEDURE — G0177 OPPS/PHP; TRAIN & EDUC SERV: HCPCS

## 2022-06-02 PROCEDURE — 99233 SBSQ HOSP IP/OBS HIGH 50: CPT | Mod: GC | Performed by: PSYCHIATRY & NEUROLOGY

## 2022-06-02 PROCEDURE — 250N000013 HC RX MED GY IP 250 OP 250 PS 637: Performed by: STUDENT IN AN ORGANIZED HEALTH CARE EDUCATION/TRAINING PROGRAM

## 2022-06-02 PROCEDURE — 124N000002 HC R&B MH UMMC

## 2022-06-02 PROCEDURE — 90853 GROUP PSYCHOTHERAPY: CPT

## 2022-06-02 RX ADMIN — LORAZEPAM 1 MG: 1 TABLET ORAL at 14:25

## 2022-06-02 RX ADMIN — OMEPRAZOLE 40 MG: 20 CAPSULE, DELAYED RELEASE ORAL at 08:07

## 2022-06-02 RX ADMIN — LORAZEPAM 1 MG: 1 TABLET ORAL at 10:15

## 2022-06-02 RX ADMIN — LORAZEPAM 1 MG: 1 TABLET ORAL at 21:08

## 2022-06-02 RX ADMIN — FLUPHENAZINE HYDROCHLORIDE 15 MG: 5 SOLUTION, CONCENTRATE ORAL at 21:08

## 2022-06-02 NOTE — PLAN OF CARE
"Occupational Therapy Group Note      06/02/22 1219   Group Therapy Session   Group Attendance attended group session   Total Time patient participated (minutes) 35   Group Type psychotherapeutic   Group Topic Covered coping skills/lifestyle management;relaxation techniques   Group Session Detail Topic: Sensory system education and sampler (essential oils, body scanning/progressive muscle relaxation, self-massage, diaphragmatic breathing and deep pressure) for parsympathetic nervous system activation, sensory modulation, building mind-body awareness, building insight into total body wellness and coping with stress/sxs.   Patient Response/Contribution discussed personal experience with topic;verbalizations were off topic    Pt expressed interest in group topic and joined.  Pt was the only patient in group for the first ~30 minutes.  He was hyperverbal with pressured speech and was tangential.  The pt was talking about delusional and grandiose topics for the majority of group.  Pt told writer, \"I'm not manic, I'm hypervigilent,\" and demonstrated this by showing writer how fast he reacted to a pencil dropping.  Pt demonstrated improvement in hyperactivity when writer had him use a weighted collar, lavender essential oil and guided him through a body scan with some progressive muscle relaxation.  Pt expressed gratitude for group and for writer listening to him.           "

## 2022-06-02 NOTE — PLAN OF CARE
Occupational Therapy Group Note      06/02/22 1438   Group Therapy Session   Group Attendance attended group session   Time Session Began 0115   Time Session Ended 0210   Total Time patient participated (minutes) 55   Total # Attendees 6   Group Type recreation   Group Topic Covered coping skills/lifestyle management;leisure exploration/use of leisure time   Group Session Detail topic: Pictionary game and education on using leisure activities to practice daily living skills for mood improvement, promoting social engagement, initiation/sequencing/follow-through/attention, reality orientation and coping with stress/symptoms.      Patient Response/Contribution cooperative with task;organized    Pt joined group. Pt was grandiose at check in, talking in detail about how good he is at [place].  During the game pt was goal-directed, able to track with turn taking and able to follow through with steps required to be successful with his turn.  Pt's pressured speech and grandiosity appear to be more controlled when he is engaged in multi-step group tasks.  Pt shared that he liked the game and expressed gratitude for group.

## 2022-06-02 NOTE — PLAN OF CARE
"  Problem: Sleep Disturbance  Goal: Adequate Sleep/Rest  Outcome: Ongoing, Progressing   Goal Outcome Evaluation:    Pt appeared asleep at the beginning of the shift till about 0200 when pt came out to the nurses station requested and received sleepy tea and snacks. Pt paced the hallway back and forth for about 45 minutes, writer offered him PRN Ativan but he declined. Stated \" I figured I won't be taking it out there when am discharged, so am not taking it anymore\". At 0330 pt appeared to be sleeping. Pt slept for approximately 5.75 Hours. No medical or behavioral event observed or reported.      "

## 2022-06-02 NOTE — PLAN OF CARE
"  Problem: Manic Behavior Episode  Goal: Decreased Manic Symptoms  Outcome: Ongoing, Progressing     Problem: Cognitive Impairment (Psychotic Signs/Symptoms)  Goal: Optimal Cognitive Function (Psychotic Signs/Symptoms)  Outcome: Ongoing, Progressing   Goal Outcome Evaluation:    Pt isolative to room, social with selected peers. Pt attended groups. Denies depression and anxiety, endorsed hallucination pt stated \" the only voice I hear is the voice of God, and it's all positive.\" Pt paced the hallway back and forth listening to music with the headphone. He ate 90% of his dinner with adequate fluid intake. Medication compliant, pt requested and received PRN Ativan 1 Mg with his scheduled medication at 2100. No medication side effect reported or observed. No behavioral event noted.    "
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
## 2022-06-02 NOTE — PLAN OF CARE
"  Problem: Manic Behavior Episode  Goal: Decreased Manic Symptoms  Outcome: Ongoing, Progressing     Problem: Sleep Disturbance  Goal: Adequate Sleep/Rest  Outcome: Ongoing, Progressing     Problem: Pain Acute  Goal: Acceptable Pain Control and Functional Ability  Outcome: Ongoing, Progressing     Problem: Suicidal Behavior  Goal: Suicidal Behavior is Absent or Managed  Outcome: Ongoing, Progressing     Problem: Cognitive Impairment (Psychotic Signs/Symptoms)  Goal: Optimal Cognitive Function (Psychotic Signs/Symptoms)  Outcome: Ongoing, Progressing   Goal Outcome Evaluation:    Plan of Care Reviewed With: patient        Asked patient how he was doing this morning patient responded \"No comment\" took am medication and went back to bed.    Requested and was given Lorazepam 1 mg for anxiety and asked that staff wake him up if he is sleeping to receive another dose of prn Lorazepam, educated patient on use of prn. Patient calmer this shift. No outburst nor hyper verbal behavior noted. Continues to use headphone to listen to music and keep self occupied. Patient denied all psych symptoms. Patient ate well this shift. No perseveration over discharge nor medication this shift. He attended group.    Patient received  Prn Lorazepam twice this shift for anxiety. Staff to continue to monitor.       "

## 2022-06-02 NOTE — PLAN OF CARE
"  Problem: Manic Behavior Episode  Goal: Decreased Manic Symptoms  Outcome: Ongoing, Progressing     Nursing Assessment    Recent Vitals: B/P:113/75  T:97.7  P:75     General Shift Summary  Pt visible on unit and social with peers this shift. Pt talked with select peers at length about different ways to be able to communicate with God. Pt also asking staff he has not yet met if they have 20-30 minutes for him to tell them his story. Pt also spent time stretching and exercising. Pt presenting with grandiosity, pressured speech and mildly elevated mood. Pt sx however seem to be improving. No behavioral concerns this shift. Pt was pleasant in interactions. Pt denies all MH sx.     Patient is medication compliant. When given prolixin pt tells RN, \"I'm in AA, I don't agree with being given alcohol\"(benzyl alcohol is excipient in prolixin solution). Pt also comments to peer that, \"this is my poison (in reference to prolixin) my medications are at home.\" When asked about medication side effects pt states they do not have any and, \"I'm much too fortified for that, a small amount of torture over time will fortify the mind.\" No PRN medications this shift.     Chidi Milton RN    "

## 2022-06-03 PROCEDURE — 250N000013 HC RX MED GY IP 250 OP 250 PS 637: Performed by: STUDENT IN AN ORGANIZED HEALTH CARE EDUCATION/TRAINING PROGRAM

## 2022-06-03 PROCEDURE — 99233 SBSQ HOSP IP/OBS HIGH 50: CPT | Mod: GC | Performed by: PSYCHIATRY & NEUROLOGY

## 2022-06-03 PROCEDURE — 124N000002 HC R&B MH UMMC

## 2022-06-03 RX ADMIN — OMEPRAZOLE 40 MG: 20 CAPSULE, DELAYED RELEASE ORAL at 09:15

## 2022-06-03 RX ADMIN — LORAZEPAM 1 MG: 1 TABLET ORAL at 21:02

## 2022-06-03 RX ADMIN — FLUPHENAZINE HYDROCHLORIDE 15 MG: 5 SOLUTION, CONCENTRATE ORAL at 21:02

## 2022-06-03 RX ADMIN — LORAZEPAM 1 MG: 1 TABLET ORAL at 16:10

## 2022-06-03 RX ADMIN — LORAZEPAM 1 MG: 1 TABLET ORAL at 09:15

## 2022-06-03 ASSESSMENT — ACTIVITIES OF DAILY LIVING (ADL)
DRESS: INDEPENDENT
LAUNDRY: WITH SUPERVISION
DRESS: INDEPENDENT
ORAL_HYGIENE: INDEPENDENT
ORAL_HYGIENE: INDEPENDENT
HYGIENE/GROOMING: INDEPENDENT
HYGIENE/GROOMING: INDEPENDENT
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
## 2022-06-03 NOTE — PLAN OF CARE
Assessment/Intervention/Current Symptoms and Care Coordination  Patient continues to exhibit delusional and grandiose thinking.  He is attending groups, lacks insight.     Discharge Plan or Goal  Likely to IRTS    Barriers to Discharge   Patient is unable to care for himself at this time due to severity of illness. Medication adjustments and continuation of commitment are in process.    Referral Status  None yet    Legal Status  Commitment/Jo; recommitment hearing has been requested, awaiting more information on date of hearing.
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
## 2022-06-03 NOTE — PLAN OF CARE
[facility]  Occupational Therapy Behavioral Health Assessment    Patient Name: Sam Minor    Description: OT staff met with Sam to review the role of occupational therapy and explain the value of having them involved in their treatment plan including options to meet current needs/self-identified goals. Overall, Sam has attended 2 OT group interventions so far.  In group with only writer, pt presented as grandiose with pressured/tangential speech and overall delusional thought content (I.e. going in detail about his sugar metabolism, talking about research on medications and someday winning the Noble [employer] Prize, being the one who discovered ASMR, etc.).  When in group with peers, pt was more reciprocal in his interactions and able to maintain his attention on the task vs his delusional thought content.  The below evaluation is a compilation of functional performance observation and information obtained from an OT self-assessment.     Clinical impression through direct observation:       06/03/22 0914   Clinical Impression   Affect Excessive, manic   Orientation Oriented to person, place and time   Appearance and ADLs General cleanliness observed in most areas   Attention to Internal Stimuli No observed signs   Interaction Skills Intrusive  (struggles with reciprocal conversation; is tangential)   Ability to Communicate Needs Independent;Demanding;Intrusive  (can be intrusive and demanding towards unit staff)   Verbal Content Tangential;Hyperverbal;Pressured  (some inappropriate topics)   Ability to Maintain Boundaries Needs occasional cues  (intermittently redirectable when in a positive mood.)   Participation   (has begun initiating participation in OT groups)   Concentration Concentrates 30+ minutes  (concentrates in groups with peers and structure; struggles to concentrate on therapeutic topics when receiving 1:1 attention)   Ability to Concentrate With structure;Easily distracted;With refocus  "  Follows and Comprehends Directions Independently follows 2 step verbal directions   Memory Delayed and immediate recall intact   Organization Independently organizes simple tasks   Decision Making Needs choices limited to 3 choices   Planning and Problem Solving Impulsive  (identifies problems but not realistic alternatives; problems tend to be based in delusional thinking)   Frustrations / Stress Tolerance Independently identifies sources of frustration/stress;Easily frustrated;Indirect responses to frustration;Inappropriate responses to frustration  (Pt's only coping skill is walking, which he tends to be obsessive about)   Level of Insight   (limited insight into stress and symtpoms at this time as pt is perseverative on delusional content.)   Self Esteem Can identify positives  (grandiose)   Social Supports   (vague identification of social supports- \"friends\")       Self-reported answers on Occupational Assessment:    Patient identified these emotional, physical or mental health concerns: \"Release tension\"    Patient ashkan with these concerns: \"Weed\"    Patient enjoys spending time with: \"Friends outside walking\"    Identified enjoyment in activities: \"Anything physical or food related.\"   Stressors or changes in the past year: \"weight loss\"    Patient identified values: \"Empathy\"    Patient's goal for the future: \"End of commitment.\"     Patient indicated success in: (Bolded items indicate activities the pt selected)     Time spent with family or friends  Relaxing and enjoying myself  Having a satisfying routine  Work or volunteering   Concentrating on my tasks     Living independently - \"Eventually\"  Taking care of the place where I live  Transportation  Managing my finances  Managing my basic needs (food, medicine, sleep)  Learning new things  Ability to pursue my goals  Other:    Patient indicated barriers to success are: (Bolded items indicate activities the pt selected)   Difficulty " concentrating  Memory problems  Physical health/Chronic Pain  Lacks support  Motivation/mood  Finances  Using drugs or alcohol  Sleeping too much or not enough  Missing work/appointments  Bothered by lights or sounds in the environment  Bothered by touch, texture, or movement  Lack of satisfying daily routine   Living environment  Transportation  Other:        Patient indicated these supports: (Bolded items indicate activities the pt selected)   Safe place to live  Family, friends or caregivers to help out when needed  A best friend or significant other  Pets  Belief in myself and abilities  Routines and rituals that support my wellness  Access to email, social media, phone calls  Leisure supplies to support my interests and hobbies  Professionals: , Therapist, etc.  Other:     Goals selected by patient to work on with OT: (Bolded items indicate activities the pt selected)   Have hope for the future  Feel better  Learn ways to stay well and avoid coming to the hospital  Share my thoughts and feelings  Feel more confident  Improve my sleep  Improve my concentration  Relax and enjoy myself  Improve my relationships with others  Handle my frustrations  Develop a satisfying daily routine  Manage my physical pain  Explore use of sensory coping strategies  Other:          Assessment: Anticipate patient will benefit from a safe, structured environment focusing on recovery strategies while stabilizing on medication.  Patient appears to have limited knowledge of and follow through with coping skills and self management strategies, thus contributing to exacerbation of mental health symptoms.  Patient presents with limited insight into their symptoms and recovery at this time, continuing to deny any mental health symptoms or need for treatement.       Patient would benefit from OT groups for exploration of coping skills and leisure interests for symptom and stress management, exploration of and practice using  relapse prevention strategies, and opportunities for: Self care skills, social interaction skills, self management skills and ADL/work/leisure/processing skills training.       Plan: Initiate care plan goals and interventions.    Sam participated in goal(s) selection and understands the plan of care: Yes    Plan for Next Treatment: Provide graded occupation-based activities for increased success and ongoing assessment.     Gaby Caal OT on 6/3/2022 at 9:29 AM

## 2022-06-03 NOTE — PLAN OF CARE
"Goal Outcome Evaluation:    Plan of Care Reviewed With: patient     Problem: Manic Behavior Episode  Goal: Decreased Manic Symptoms  Outcome: Ongoing, Progressing  Intervention: Promote Mood Equilibrium  Recent Flowsheet Documentation  Taken 6/3/2022 0900 by Kay Conner RN  Behavior Management: impulse control promoted  Supportive Measures: active listening utilized     Problem: Manic Behavior Episode  Goal: Decreased Manic Symptoms  Intervention: Promote Mood Equilibrium  Recent Flowsheet Documentation  Taken 6/3/2022 0900 by Kay Conner RN  Behavior Management: impulse control promoted  Supportive Measures: active listening utilized      Patient came out for his breakfast at 0900. Verbalized having slept well last night. Patient was compliant with his morning medications. PRN Lorazepam administered at request. Accepted vital signs check. Patient denied anxiety and depression. He denied SI/HI and hallucinations but still hears the voice of god.. Stated he wants his \"Ativan\" so he could go back to sleep. Appeared calm and collected, responded to humor.            "

## 2022-06-04 PROCEDURE — 250N000013 HC RX MED GY IP 250 OP 250 PS 637: Performed by: STUDENT IN AN ORGANIZED HEALTH CARE EDUCATION/TRAINING PROGRAM

## 2022-06-04 PROCEDURE — 124N000002 HC R&B MH UMMC

## 2022-06-04 RX ADMIN — OMEPRAZOLE 40 MG: 20 CAPSULE, DELAYED RELEASE ORAL at 12:29

## 2022-06-04 RX ADMIN — LORAZEPAM 1 MG: 1 TABLET ORAL at 20:21

## 2022-06-04 RX ADMIN — FLUPHENAZINE HYDROCHLORIDE 15 MG: 5 SOLUTION, CONCENTRATE ORAL at 20:21

## 2022-06-04 ASSESSMENT — ACTIVITIES OF DAILY LIVING (ADL)
DRESS: INDEPENDENT
HYGIENE/GROOMING: INDEPENDENT
LAUNDRY: WITH SUPERVISION
DRESS: INDEPENDENT
HYGIENE/GROOMING: INDEPENDENT
ORAL_HYGIENE: INDEPENDENT
ORAL_HYGIENE: INDEPENDENT
LAUNDRY: WITH SUPERVISION

## 2022-06-04 NOTE — PLAN OF CARE
Problem: Sleep Disturbance  Goal: Adequate Sleep/Rest  Outcome: Ongoing, Progressing  Intervention: Promote Sleep/Rest  Flowsheets (Taken 6/4/2022 0603)  Sleep/Rest Enhancement: regular sleep/rest pattern promoted     Patient appeared to have slept 6.75 hours. He was awake in bed x2 during rounds. No concerns noted. No PRN medications given.

## 2022-06-04 NOTE — PLAN OF CARE
"Goal Outcome Evaluation:    Plan of Care Reviewed With: patient        Problem: Behavioral Health Plan of Care  Goal: Optimized Coping Skills in Response to Life Stressors  Intervention: Promote Effective Coping Strategies  Recent Flowsheet Documentation  Taken 6/4/2022 1300 by Kay Conner RN  Supportive Measures: active listening utilized     Patient declined to come out for breakfast. He woke up for lunch. Unfortunately was served what looked like uncooked meat in his sandwich. Patient refused to have a different tray sent up for him. Did not eat lunch either. Patient was seen pacing on the hallway with headphones. Appeared bright and jovial initially. Mood lability noted quickly. He became dismissive and guarded with assessment questions. \"Don't look like you are concerned.\" Patient appeared irritable, with flat, blunted affect, ignored RN and continued pacing. After some pacing, patient stopped at the nurses' desk and said, \"you got to let my doctor know I'm ready for discharge.\" Patient paced a little while before retiring to his room. He had his morning medication at 1229. Declined to have his PRN Ativan this time. Was compliant with vital signs check.       "

## 2022-06-04 NOTE — PLAN OF CARE
"  Problem: Pain Acute  Goal: Acceptable Pain Control and Functional Ability  Outcome: Ongoing, Progressing  Intervention: Prevent or Manage Pain  Recent Flowsheet Documentation  Taken 6/3/2022 1828 by Winifred Curry RN  Medication Review/Management: medications reviewed     Problem: Suicidal Behavior  Goal: Suicidal Behavior is Absent or Managed  Intervention: Provide Immediate and Ongoing Protective Physical Environment  Recent Flowsheet Documentation  Taken 6/3/2022 1828 by Winifred Curry RN  Safe Transition Promotion: protective factors promoted     Problem: Cognitive Impairment (Psychotic Signs/Symptoms)  Goal: Optimal Cognitive Function (Psychotic Signs/Symptoms)  Intervention: Support and Promote Cognitive Ability  Recent Flowsheet Documentation  Taken 6/3/2022 1828 by Winifred Curry RN  Trust Relationship/Rapport:    emotional support provided    empathic listening provided    thoughts/feelings acknowledged    questions answered   Goal Outcome Evaluation:       Plan of Care Reviewed With: patient    Pt was met in his room, when asked how he was doing? Pt replied \"I'm not ok. I'm being maltreated, misdiagnosed. I'm not involve in my cares.\" Pt lacks insight into his illness as presented by his denial of any psych symptoms. Pt said \"I don't have mental illness. I don't have bipolar, I'm just hyperactive.\" He said he is waiting on his  to deal with the matter. I asked pt how I can help him. Pt said \"Monday they need to give me a needle in my arm and let me go.\" Pt denies anxiety but requested and received prn ativan 1 mg po at 1610. Pt came to desk and thank writer for listening to his concerns. He said he was going to report to \"patient relation\" for the way he's being treated by the Doctors and he is being held here against his will. Pt paces intermittently with a selective peer. He was later observed  at the end of the black sitting on the ground with peers. Pt performed meditation in bed. He " said God wants him to talk to writer but he did not say about what. Pt requested and received prn ativan for anxiety with hs meds. Pt reported relief, no other issues noted. Pt denies SI/HI, SIB and all other psych symptoms.

## 2022-06-05 PROCEDURE — 250N000013 HC RX MED GY IP 250 OP 250 PS 637: Performed by: STUDENT IN AN ORGANIZED HEALTH CARE EDUCATION/TRAINING PROGRAM

## 2022-06-05 PROCEDURE — 124N000002 HC R&B MH UMMC

## 2022-06-05 RX ADMIN — OMEPRAZOLE 40 MG: 20 CAPSULE, DELAYED RELEASE ORAL at 12:38

## 2022-06-05 RX ADMIN — FLUPHENAZINE HYDROCHLORIDE 15 MG: 5 SOLUTION, CONCENTRATE ORAL at 19:55

## 2022-06-05 RX ADMIN — LORAZEPAM 1 MG: 1 TABLET ORAL at 20:45

## 2022-06-05 ASSESSMENT — ACTIVITIES OF DAILY LIVING (ADL)
HYGIENE/GROOMING: INDEPENDENT
DRESS: SCRUBS (BEHAVIORAL HEALTH)
ORAL_HYGIENE: INDEPENDENT
LAUNDRY: WITH SUPERVISION
HYGIENE/GROOMING: INDEPENDENT
DRESS: INDEPENDENT
ORAL_HYGIENE: INDEPENDENT

## 2022-06-05 NOTE — PROGRESS NOTES
Patient is asking to have snacks sent other than ensure shakes. he would like a sandwich. He requests afternoon and evening snacks. He would like to talk to dietician or just have someone order that for him. Joanna Fowler RN

## 2022-06-05 NOTE — PLAN OF CARE
"Goal Outcome Evaluation:    Plan of Care Reviewed With: patient        Problem: Manic Behavior Episode  Goal: Decreased Manic Symptoms  Outcome: Ongoing, Progressing  Intervention: Promote Mood Equilibrium  Recent Flowsheet Documentation  Taken 6/5/2022 0923 by Kay Conner RN  Behavior Management: impulse control promoted  Sensory Stimulation Regulation: quiet environment promoted  Supportive Measures: active listening utilized        Patient less manic, less talkative but presents with flat blunted, irritable mood. Was snappy. Refused vital signs check. Climbed unto the standing weighing machine oh his own. Tried to clear the result before anyone could see it. Result was seen by staff and was documented. Sam declined his morning medication. \"I am here for breakfast\" he snapped. RN approached patient and commented that he appeared sad and asked what was happening to him. Patient responded, \"Just trying not to wake up all the way.\" Patient shut the his door and reported that he was going back to sleep.   Patient woke up for lunch. Appeared to be in a good mood this time. Patient accepted his morning medication at 1238. Went back to pacing on the hallway after lunch. Makes his usual stops at the Nurses' desk to say what sounds like his own discovered quote, \"Small amount of torture overtime fortifies the mind.\" On another stop patient stated that he stopped ordering Kiwi and Strawberry water and that he is drinking regular water to balance... Patient was seen once on the floor in the milieu performing yoga poses. No signs of violence seen in patient's behavior. Patient retired to his room a brief time on unit.      "

## 2022-06-05 NOTE — PLAN OF CARE
Goal Outcome Evaluation:    Plan of Care Reviewed With: patient      Problem: Behavioral Health Plan of Care  Goal: Optimized Coping Skills in Response to Life Stressors  6/4/2022 1903 by Kay Conner RN  Outcome: Ongoing, Progressing  6/4/2022 1322 by Kay Conner RN  Outcome: Ongoing, Progressing  Intervention: Promote Effective Coping Strategies  Recent Flowsheet Documentation  Taken 6/4/2022 1859 by Kay Conner RN  Supportive Measures: active listening utilized  Taken 6/4/2022 1300 by Kay Conner RN  Supportive Measures: active listening utilized     Patient visible on the unit. Patient pacing on the hallway with select peer wearing headset. Patient observed demonstrating calm pleasant behavior. Was interactive. On multiple occasions this shift patient was seen acting as a spiritual healer to calm a peer by making the peer sit on a mat in her room facing the door while Sam sits on the floor outside peer's door facing the peer as both of them did chanting. There was no inappropriate contact with both patients. Patient denied any mood dysregulation or thought disturbances. Still religiously preoccupied. Was compliant with his medications.  he went to bed at 2030.

## 2022-06-05 NOTE — PLAN OF CARE
Pt appeared to sleep 6.25 hours. Pt awake briefly for snacks. No PRNs given or requested. No medical or behavioral events this shift.      Problem: Sleep Disturbance  Goal: Adequate Sleep/Rest  Outcome: Ongoing, Progressing

## 2022-06-06 PROCEDURE — 250N000013 HC RX MED GY IP 250 OP 250 PS 637: Performed by: STUDENT IN AN ORGANIZED HEALTH CARE EDUCATION/TRAINING PROGRAM

## 2022-06-06 PROCEDURE — 124N000002 HC R&B MH UMMC

## 2022-06-06 PROCEDURE — 250N000011 HC RX IP 250 OP 636: Performed by: STUDENT IN AN ORGANIZED HEALTH CARE EDUCATION/TRAINING PROGRAM

## 2022-06-06 PROCEDURE — 99233 SBSQ HOSP IP/OBS HIGH 50: CPT | Mod: GC | Performed by: PSYCHIATRY & NEUROLOGY

## 2022-06-06 RX ADMIN — LORAZEPAM 1 MG: 1 TABLET ORAL at 20:48

## 2022-06-06 RX ADMIN — FLUPHENAZINE HYDROCHLORIDE 15 MG: 5 SOLUTION, CONCENTRATE ORAL at 20:43

## 2022-06-06 RX ADMIN — LORAZEPAM 1 MG: 1 TABLET ORAL at 06:47

## 2022-06-06 RX ADMIN — FLUPHENAZINE DECANOATE 25 MG: 25 INJECTION, SOLUTION INTRAMUSCULAR; SUBCUTANEOUS at 11:40

## 2022-06-06 RX ADMIN — LORAZEPAM 1 MG: 1 TABLET ORAL at 13:09

## 2022-06-06 RX ADMIN — OMEPRAZOLE 40 MG: 20 CAPSULE, DELAYED RELEASE ORAL at 10:50

## 2022-06-06 ASSESSMENT — ACTIVITIES OF DAILY LIVING (ADL)
HYGIENE/GROOMING: INDEPENDENT
LAUNDRY: WITH SUPERVISION
DRESS: INDEPENDENT
DRESS: INDEPENDENT
HYGIENE/GROOMING: INDEPENDENT
ORAL_HYGIENE: INDEPENDENT
ORAL_HYGIENE: INDEPENDENT
LAUNDRY: WITH SUPERVISION

## 2022-06-06 NOTE — PLAN OF CARE
Assessment/Intervention/Current Symptoms and Care Coordination  CTC met with patient at his request.  Patient stated that he is unhappy with his care and he plans to hire a .  He feels he has not been included in his own treatment plan.  After a few minutes of expressing his frustrations he thanked writer for coming by and stated he had no more needs from writer at this time.     Discharge Plan or Goal  TBD; not welcome to return to parents home, may need IRTS referrals when more stable.    Barriers to Discharge   Patient lacks insight, is reluctant regarding medications, unable to care for self at this time.    Referral Status  None    Legal Status  Commitment/Jo [facility] thru 6/28/22 with recommitment in process.

## 2022-06-06 NOTE — PLAN OF CARE
"Goal Outcome Evaluation:    Plan of Care Reviewed With: patient        Problem: Behavioral Health Plan of Care  Goal: Optimized Coping Skills in Response to Life Stressors  Outcome: Ongoing, Progressing  Intervention: Promote Effective Coping Strategies  Recent Flowsheet Documentation  Taken 6/6/2022 1100 by Kay Conner RN  Supportive Measures: active listening utilized     Patient woke up close to 1100 and had his morning medication. Patient also had his breakfast then. Still denies psych symptoms. Had long acting Injection Prolixin today on the right deltoid. Patient tolerated well. \"This will put me to sleep the rest of the day because it's a very thick poison.\"Patient asked RN to wake him up for lunch and then added, \"Let my doctor know I'm ready for discharge.\" Patient was visible at lunch. Reported agitation and requested for PRN Ativan. Same administered at 1309. Patient asked to talk to his  when she comes back from break. He then complained that he has not talked with SW about his discharge plan. \"I'm not even part of my treatment plan.\" Patient spent most of the shift isolative in his room.         "

## 2022-06-06 NOTE — PLAN OF CARE
"  Problem: Manic Behavior Episode  Goal: Decreased Manic Symptoms  Outcome: Ongoing, Progressing  Intervention: Promote Mood Equilibrium  Recent Flowsheet Documentation  Taken 6/5/2022 1625 by Winifred Curry RN  Behavior Management: impulse control promoted  Sensory Stimulation Regulation: music on     Problem: Manic Behavior Episode  Goal: Decreased Manic Symptoms  Intervention: Promote Mood Equilibrium  Recent Flowsheet Documentation  Taken 6/5/2022 1625 by Winifred Curry RN  Behavior Management: impulse control promoted  Sensory Stimulation Regulation: music on     Problem: Suicidal Behavior  Goal: Suicidal Behavior is Absent or Managed  6/5/2022 2137 by Winifred Curry RN  Outcome: Ongoing, Progressing  Flowsheets (Taken 6/5/2022 2137)  Mutually Determined Action Steps (Suicidal Behavior Absent/Managed): verbalizes safety check rationale  6/5/2022 1617 by Winifred Curry RN  Outcome: Ongoing, Progressing  Flowsheets (Taken 6/5/2022 1617)  Mutually Determined Action Steps (Suicidal Behavior Absent/Managed): identifies protective factors  Intervention: Provide Immediate and Ongoing Protective Physical Environment  Recent Flowsheet Documentation  Taken 6/5/2022 1625 by Winifred Curry RN  Safe Transition Promotion: protective factors promoted     Problem: Pain Acute  Goal: Acceptable Pain Control and Functional Ability  Outcome: Ongoing, Progressing  Intervention: Prevent or Manage Pain  Recent Flowsheet Documentation  Taken 6/5/2022 1625 by Winifred Curry RN  Sensory Stimulation Regulation: music on  Medication Review/Management: medications reviewed   Goal Outcome Evaluation:    Plan of Care Reviewed With: patient   Pt was in the black-way pacing back and forth as he continues to use head phone to listen to music. Pt presented with grandiosity pressured speech and labile mood. Pt stopped at the desk  several times. He said he can't to wait to go home and eat real food \"the food I'm getting here " "taste like shit. It's all frozen food or can food. All I have been able to eat is banana\" He reported that he has lost 90 lbs since his admission \"my love handles are almost gone\". He complained of snacks time not going good for him. He said all he gets is banana and protein shake \"that shake taste nasty\". He said he is hungry all the time because he works-out all the time. He was offered sandwich at snack time but he refused. He said he will like to receive small frequent meals throughout the day. Pt requested and receive color markers, did some art work in the [facility] area. Pt communicated with some peers about yoga, God etc. He said meditating clear ones mind. He was heard telling a peer \"God is a female. God will kill me if I write all this stuff down.\" later in the evening pt was heard talking on the phone to his mother that the Doctors lied about reducing prolixin dose. He appeared to be upset after this phone conversation.He was observed sitting on the floor doing meditation with a peer. Pt also did some stretching and exercising. Prn ativan given at hs as requested. Patient is medication compliant. He denies pain and all mental health symptoms.                   "

## 2022-06-06 NOTE — PLAN OF CARE
Pt appeared to sleep 6.5 hours. Up to report he was feeling starved, and his rations had run out- given snacks. Later given PRN ativan for reported anxiety. No medical or behavioral events this shift.      Problem: Sleep Disturbance  Goal: Adequate Sleep/Rest  Outcome: Ongoing, Progressing

## 2022-06-06 NOTE — PLAN OF CARE
Goal Outcome Evaluation:    Plan of Care Reviewed With: patient        Problem: Behavioral Health Plan of Care  Goal: Optimal Comfort and Wellbeing  6/6/2022 1756 by Kay Conner RN  Outcome: Ongoing, Progressing  6/6/2022 1134 by Kay Conner RN  Outcome: Ongoing, Progressing     Problem: Behavioral Health Plan of Care  Goal: Optimized Coping Skills in Response to Life Stressors  6/6/2022 1756 by Kay Conner RN  Outcome: Ongoing, Progressing  6/6/2022 1134 by Kay Conner RN  Outcome: Ongoing, Progressing  Intervention: Promote Effective Coping Strategies  Recent Flowsheet Documentation  Taken 6/6/2022 1751 by Kay Conner RN  Supportive Measures: active listening utilized  Taken 6/6/2022 1100 by Kay Conner RN  Supportive Measures: active listening utilized     Patient was visible at dinner. Retired to his room afterwards. Night medication was taken to him in his room. Patient was not happy that he received long acting IM Prolixin today and he had to take oral Prolixin at night. Patient took his medication but said he wants to make a complaint to patient representative because the doctor is giving him overdose of Prolixin. Patient said that he is having headache from taking overdose of Prolixin. RN offered to administer PRN Tylenol but patient declined. Had Ativan at 2048. Less pacing noted. Did not attend any group activity today.
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
## 2022-06-07 PROCEDURE — H2032 ACTIVITY THERAPY, PER 15 MIN: HCPCS

## 2022-06-07 PROCEDURE — 250N000013 HC RX MED GY IP 250 OP 250 PS 637: Performed by: STUDENT IN AN ORGANIZED HEALTH CARE EDUCATION/TRAINING PROGRAM

## 2022-06-07 PROCEDURE — 99232 SBSQ HOSP IP/OBS MODERATE 35: CPT | Mod: GC | Performed by: PSYCHIATRY & NEUROLOGY

## 2022-06-07 PROCEDURE — 124N000002 HC R&B MH UMMC

## 2022-06-07 RX ADMIN — FLUPHENAZINE HYDROCHLORIDE 15 MG: 5 SOLUTION, CONCENTRATE ORAL at 21:50

## 2022-06-07 RX ADMIN — OMEPRAZOLE 40 MG: 20 CAPSULE, DELAYED RELEASE ORAL at 10:25

## 2022-06-07 RX ADMIN — LORAZEPAM 1 MG: 1 TABLET ORAL at 13:31

## 2022-06-07 RX ADMIN — LORAZEPAM 1 MG: 1 TABLET ORAL at 21:50

## 2022-06-07 ASSESSMENT — ACTIVITIES OF DAILY LIVING (ADL)
DRESS: INDEPENDENT
HYGIENE/GROOMING: INDEPENDENT
LAUNDRY: WITH SUPERVISION
HYGIENE/GROOMING: INDEPENDENT
ORAL_HYGIENE: INDEPENDENT
DRESS: SCRUBS (BEHAVIORAL HEALTH)
ORAL_HYGIENE: INDEPENDENT

## 2022-06-07 NOTE — PLAN OF CARE
Assessment/Intervention/Current Symptoms and Care Coordination  Court paperwork arrived today indicating the date for patient's exam and recommitment hearing. This will take place remotely on 6/21/22. Patient is aware and did receive a copy of the notice. Presents as irritable today.      Discharge Plan or Goal  To IRTS when stable    Barriers to Discharge   Medication adjustments continue; patient lacks insight and is exhibiting significant symptoms.    Referral Status  None    Legal Status  Commitment/Jo [facility], Recommitment exam and hearing set for 6/21/22 @ 9am

## 2022-06-07 NOTE — PLAN OF CARE
06/07/22 1327   Individualization/Patient Specific Goals   Patient Personal Strengths family/social support;expressive of emotions;expressive of needs   Patient Vulnerabilities lacks insight into illness;poor impulse control   Interprofessional Rounds   Participants psychiatrist;CTC;nursing   Team Discussion   Participants Dr. Swan; Kay Conner RN; Namrata Lidna CTC   Progress not adequate for discharge   Anticipated length of stay 2-3 additional weeks   Continued Stay Criteria/Rationale symptoms of erum / psychosis present requiring ongoing treatment and assessment.   Plan continue with medication managment and assessment per psychiatry. recommitment / Jo process is ongoing.   Rationale for change in precautions or plan no change in plan   Safety Plan see below precautions   Anticipated Discharge Disposition IRTS   PRECAUTIONS AND SAFETY    Behavioral Orders   Procedures    Assault precautions    Cheeking Precautions (behavioral units)     Patient Observed swallowing PO medications; Patient asked to drink water after swallowing medication; Patient in Staff line of sight for 15 minutes after medication given; Mouth checks after PO administration (patient asked to open mouth and stick out their tongue).    Code 1 - Restrict to Unit    Routine Programming     As clinically indicated    Sexual precautions    Status 15     Every 15 minutes.       Safety  Safety WDL: WDL  Patient Location: patient room, own  Observed Behavior: pacing, other (see comments) (irritable)  Observed Behavior (Comment): irritable, pressured speech  Safety Measures: safety rounds completed  Diversional Activity: music  Suicidality: Status 15  Assault: status 15  Elopement: status 15, behavioral scrubs (pajamas), signs posted on unit entrance / exit doors  Sexual: status 15
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
## 2022-06-08 PROCEDURE — H2032 ACTIVITY THERAPY, PER 15 MIN: HCPCS

## 2022-06-08 PROCEDURE — 250N000013 HC RX MED GY IP 250 OP 250 PS 637: Performed by: STUDENT IN AN ORGANIZED HEALTH CARE EDUCATION/TRAINING PROGRAM

## 2022-06-08 PROCEDURE — 99233 SBSQ HOSP IP/OBS HIGH 50: CPT | Mod: GC | Performed by: PSYCHIATRY & NEUROLOGY

## 2022-06-08 PROCEDURE — 124N000002 HC R&B MH UMMC

## 2022-06-08 RX ORDER — LANOLIN ALCOHOL/MO/W.PET/CERES
3 CREAM (GRAM) TOPICAL
Status: DISCONTINUED | OUTPATIENT
Start: 2022-06-08 | End: 2022-07-01 | Stop reason: HOSPADM

## 2022-06-08 RX ADMIN — LORAZEPAM 1 MG: 1 TABLET ORAL at 20:33

## 2022-06-08 RX ADMIN — LORAZEPAM 1 MG: 1 TABLET ORAL at 09:48

## 2022-06-08 RX ADMIN — FLUPHENAZINE HYDROCHLORIDE 15 MG: 5 SOLUTION, CONCENTRATE ORAL at 20:32

## 2022-06-08 RX ADMIN — MELATONIN TAB 3 MG 3 MG: 3 TAB at 20:33

## 2022-06-08 RX ADMIN — OMEPRAZOLE 40 MG: 20 CAPSULE, DELAYED RELEASE ORAL at 09:47

## 2022-06-08 ASSESSMENT — ACTIVITIES OF DAILY LIVING (ADL)
HYGIENE/GROOMING: INDEPENDENT
ORAL_HYGIENE: INDEPENDENT
LAUNDRY: WITH SUPERVISION
DRESS: INDEPENDENT
HYGIENE/GROOMING: INDEPENDENT
ORAL_HYGIENE: INDEPENDENT
DRESS: INDEPENDENT
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
## 2022-06-08 NOTE — PLAN OF CARE
Assessment/Intervention/Current Symptoms and Care Coordination  Completed chart review and team meeting. Pt medication compliant and irritable.      Discharge Plan or Goal  To IRTS when stable     Barriers to Discharge   Medication adjustments continue; patient lacks insight and is exhibiting significant symptoms.     Referral Status  None. Waiting to make IRTS referrals until pt is more stable.      Legal Status  Commitment/Jo [facility], Recommitment exam and hearing set for 6/21/22 @ 9am

## 2022-06-08 NOTE — PLAN OF CARE
Problem: Manic Behavior Episode  Goal: Decreased Manic Symptoms  Outcome: Ongoing, Progressing  Intervention: Promote Mood Equilibrium  Recent Flowsheet Documentation  Taken 6/7/2022 1644 by Winifred Curry RN  Behavior Management: impulse control promoted  Sensory Stimulation Regulation: music on  Supportive Measures: active listening utilized     Problem: Manic Behavior Episode  Goal: Decreased Manic Symptoms  Intervention: Promote Mood Equilibrium  Recent Flowsheet Documentation  Taken 6/7/2022 1644 by Winifred Curry RN  Behavior Management: impulse control promoted  Sensory Stimulation Regulation: music on  Supportive Measures: active listening utilized     Problem: Pain Acute  Goal: Acceptable Pain Control and Functional Ability  Outcome: Ongoing, Progressing  Intervention: Prevent or Manage Pain  Recent Flowsheet Documentation  Taken 6/7/2022 1644 by Winifred Curry RN  Sensory Stimulation Regulation: music on  Bowel Elimination Promotion: adequate fluid intake promoted  Medication Review/Management: medications reviewed  Intervention: Optimize Psychosocial Wellbeing  Recent Flowsheet Documentation  Taken 6/7/2022 1644 by Winifred Curry RN  Spiritual Activities Assistance: music provided  Supportive Measures: active listening utilized   Goal Outcome Evaluation:    Plan of Care Reviewed With: patient   Pt has been visible in the millieu, intermittently pacing back and forth with head phone on. Pt is calm and cooperative. Pt showered this evening, declined bedlinen changed, he said it does not need to be change today. He attended groups, watched baseball on TV this evening and is appropriate with interaction.Thought contents observed to be clear and organized. Pt is requesting to have his current ativan dose increase to 2 mg.Pt said to update his Care-team he is building a tolerance to the 1 mg and wants his ativan increase. Pt denies all psych symptoms. No behavioral or medical concerns.

## 2022-06-08 NOTE — PLAN OF CARE
"   06/07/22 2100   Group Therapy Session   Group Attendance attended group session   Time Session Began 1710   Time Session Ended 1805   Total Time patient participated (minutes) 55   Total # Attendees 8   Group Type expressive therapy  (art therapy)   Group Topic Covered emotions/expression   Group Session Detail inside/outside drawing   Patient Response/Contribution cooperative with task;organized;discussed personal experience with topic   Patient Response Detail Sam presented as alert and cooperative. He participated in making art about his genuine self and how others perceive him. He shared his drawing with writer and talked about feeling \"misunderstood.\" He denied mental health symptoms and denied the need for medication. He described himself as an \"artist\" which means he will \"always be misunderstood.\" He interacted appropriately with peers, making friendly conversation and showing peers his geometric drawings.   Goal Outcome Evaluation:                      "
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
## 2022-06-09 PROCEDURE — 250N000013 HC RX MED GY IP 250 OP 250 PS 637: Performed by: STUDENT IN AN ORGANIZED HEALTH CARE EDUCATION/TRAINING PROGRAM

## 2022-06-09 PROCEDURE — 90853 GROUP PSYCHOTHERAPY: CPT

## 2022-06-09 PROCEDURE — 124N000002 HC R&B MH UMMC

## 2022-06-09 PROCEDURE — 99233 SBSQ HOSP IP/OBS HIGH 50: CPT | Mod: GC | Performed by: PSYCHIATRY & NEUROLOGY

## 2022-06-09 RX ADMIN — MELATONIN TAB 3 MG 3 MG: 3 TAB at 20:52

## 2022-06-09 RX ADMIN — FLUPHENAZINE HYDROCHLORIDE 15 MG: 5 SOLUTION, CONCENTRATE ORAL at 20:52

## 2022-06-09 RX ADMIN — LORAZEPAM 1 MG: 1 TABLET ORAL at 16:44

## 2022-06-09 RX ADMIN — OMEPRAZOLE 40 MG: 20 CAPSULE, DELAYED RELEASE ORAL at 08:33

## 2022-06-09 RX ADMIN — LORAZEPAM 1 MG: 1 TABLET ORAL at 20:52

## 2022-06-09 ASSESSMENT — ACTIVITIES OF DAILY LIVING (ADL)
HYGIENE/GROOMING: INDEPENDENT
LAUNDRY: WITH SUPERVISION
HYGIENE/GROOMING: INDEPENDENT
ORAL_HYGIENE: INDEPENDENT
DRESS: INDEPENDENT
DRESS: INDEPENDENT
ORAL_HYGIENE: INDEPENDENT

## 2022-06-09 NOTE — PLAN OF CARE
"  Problem: Manic Behavior Episode  Goal: Decreased Manic Symptoms  Outcome: Ongoing, Progressing     Problem: Pain Acute  Goal: Acceptable Pain Control and Functional Ability  Outcome: Ongoing, Progressing  Intervention: Prevent or Manage Pain  Recent Flowsheet Documentation  Taken 6/8/2022 1802 by Winifred Curry RN  Medication Review/Management: medications reviewed     Problem: Suicidal Behavior  Goal: Suicidal Behavior is Absent or Managed  Outcome: Ongoing, Progressing   Goal Outcome Evaluation:    Plan of Care Reviewed With: patient     Patient has been present in the milieu, social with peers and staffs. Paces on the hallway intermittently. Pt did not attend group. Pt  presents as calm and cooperative. No behavioral concerns this shift. Pt's father visited this evening, brought him a pair of tennis shoes. Pt said visit went well. Pt made couple phone calls and did not seem upset after these calls. He is eating well. He denies AVH/HI/SI/SIB, anxiety and depression. Pt given prn ativan as requested, even though he denies anxiety. He stated \"makes me sleep soundly\" No signs of internal stimuli. Patient was medication compliant. Denies pain. Pt refused Covid test.                         "
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
## 2022-06-09 NOTE — PLAN OF CARE
"  Problem: Behavioral Health Plan of Care  Goal: Optimized Coping Skills in Response to Life Stressors  Outcome: Ongoing, Progressing  Goal: Develops/Participates in Therapeutic Burns to Support Successful Transition  Outcome: Ongoing, Progressing     Problem: Manic Behavior Episode  Goal: Decreased Manic Symptoms  Outcome: Ongoing, Progressing  Patient is visible in the milieu, walks the black listening to headphones and stops at nursing station to speak to staff. Patient requested prn Ativan stating that he is \"way too agitated due to excess of sugar release in my body due to too much exercise.\" Med given. Encouraged to slow down and rest. Patient attended group. Requesting for the team to increase his Ativan to 2 mg at HS to help with sleep.  On assessment, patient continues to perseverate on his meds, states the doctors should compromise with him that when he starts Prorixin 37mg, they should let him go, states oral meds cause him visual hallucinations, he sees pictures. States his diagnoses should be \"hypervigilant\". Patient states the team does not listen to him, does not involve him in his treatment and that his CTC is on vacation. States there is no coordination between the team and his dad. He does not want to go to IR or group home. Patient goes on and on with complaints on different issues. Denies SI/SIB. Ate dinner and took his meds. Prn meds given are Ativan and Melatonin.            "

## 2022-06-09 NOTE — PLAN OF CARE
Assessment/Intervention/Current Symptoms and Care Coordination:     Attend Team Meeting     Review Chart  Pt has court exam and hearing on June 21, current order expires June 28.         Discharge Plan or Goal:     Pending final disposition of commitment.  IRTS when stable    Barriers to Discharge:     Pt not psychiatrically stable.         Referral Status:     Has a  [facility]- [phone]    Legal Status:   Committed with Jo, ends June 28, 2022.

## 2022-06-09 NOTE — PLAN OF CARE
"Goal Outcome Evaluation:    Plan of Care Reviewed With: patient     Pt presents as cooperative and pleasant. Pt seen visible in the milieu for majority of day. Pt seen pacing in halls. Pt also seen resting in bed at times. Affect blunted. Affect seen to brighten intermittently during interactions.     When asked about appetite Pt states \"it's normal.\" When asked about sleep hygiene Pt states \"Sleep. Barely but starting to get some.\" Per flowsheet, Pt seen to sleep 7 hrs and 6.75 hrs the last two nights respectively. Pt explains that when he drinks Ensure he \"feels sick.\" Pt explains he has vomited X2 recently secondary to Ensure. Pt requests a turkey sandwich, boiled eggs and yogurt in place of the Ensure. When asked about SE/adverse effects to medications Pt states \"racing thoughts and it feels like I'm ODing on the uptake.\" Pt later states \"I'll power through though\" in reference to his medications. Later from this Pt states \"the racing thoughts are from dehydration and meds sucking the water out of me.\" Pt explains he is not manic. Pt states \"I used to drink a lot of vodka and mountain dew so now I have energy from the sugar releasing.\"     Pt rates anxiety and depression 0/10 on a numerical 0-10 scale. Pt denies SI/SIB/HI. Pt denies AH.    Pt endorses VH during last night/overnight. Pt explains that he experienced strobe light flashes of dreams secondary to melatonin and prolixin administration. Pt currently denies VH.       "

## 2022-06-09 NOTE — CARE PLAN
"At 1245 Pt declined having EKG done. Pt states \"no, no no. I have back pain. My heart is fine.\"   "

## 2022-06-09 NOTE — PLAN OF CARE
Problem: Sleep Disturbance  Goal: Adequate Sleep/Rest  Outcome: Ongoing, Progressing  Intervention: Promote Sleep/Rest  Flowsheets (Taken 6/9/2022 1092)  Sleep/Rest Enhancement: regular sleep/rest pattern promoted     Patient was sleeping at start of shift. He was awake in bedroom x1 but easily went back to sleep. No behavioral concerns noted. Appeared to sleep 6.75 hours. No PRN medications given.

## 2022-06-10 PROCEDURE — 99233 SBSQ HOSP IP/OBS HIGH 50: CPT | Mod: GC | Performed by: PSYCHIATRY & NEUROLOGY

## 2022-06-10 PROCEDURE — 250N000013 HC RX MED GY IP 250 OP 250 PS 637: Performed by: STUDENT IN AN ORGANIZED HEALTH CARE EDUCATION/TRAINING PROGRAM

## 2022-06-10 PROCEDURE — 124N000002 HC R&B MH UMMC

## 2022-06-10 RX ADMIN — OMEPRAZOLE 40 MG: 20 CAPSULE, DELAYED RELEASE ORAL at 08:12

## 2022-06-10 RX ADMIN — LORAZEPAM 1 MG: 1 TABLET ORAL at 00:51

## 2022-06-10 RX ADMIN — MELATONIN TAB 3 MG 3 MG: 3 TAB at 20:13

## 2022-06-10 RX ADMIN — LORAZEPAM 1 MG: 1 TABLET ORAL at 20:13

## 2022-06-10 RX ADMIN — FLUPHENAZINE HYDROCHLORIDE 15 MG: 5 SOLUTION, CONCENTRATE ORAL at 19:35

## 2022-06-10 RX ADMIN — LORAZEPAM 1 MG: 1 TABLET ORAL at 14:24

## 2022-06-10 NOTE — PLAN OF CARE
Problem: Pain Acute  Goal: Acceptable Pain Control and Functional Ability  Outcome: Ongoing, Progressing   Problem: Suicidal Behavior  Goal: Suicidal Behavior is Absent or Managed  Outcome: Ongoing, Progressing  Problem: Cognitive Impairment (Psychotic Signs/Symptoms)  Goal: Optimal Cognitive Function (Psychotic Signs/Symptoms)  Outcome: Ongoing, Progressing   Goal Outcome Evaluation:        Pt denies pain. He denies anxiety, depression, suicide ideation, hallucination and all other psych symptoms. He contacts for safety and attends one group. He claims that he is not sick and ready for discharge. He says that he wants a  to help him get out of here. At 1424 he has one mg ativan for agitation.

## 2022-06-10 NOTE — PROGRESS NOTES
"   06/09/22 2200   Group Therapy Session   Group Attendance attended group session   Time Session Began 1645   Time Session Ended 1745   Total Time patient participated (minutes) 60   Total # Attendees 6   Group Type psychotherapeutic   Group Topic Covered emotions/expression;cognitive activities   Group Session Detail Creativity/ ACT process   Patient Response/Contribution confused;discussed personal experience with topic;disorganized;expressed reluctance to alter behaviors;refused to comply with staff direction;unable to interrupt patient;verbalizations were off topic   Mood-\"lit off sugar\", didn't eat sugar but his body makes sugar from walking 20 miles today. He was venting about being \"non medicated\", writer redirected as he and peer were peaking for the \"group\"  with a peer. He was the leader in being off topic, discussing substances in detail etc. He and peers started venting but writer had to be firm that it wasn't going to be an hour of venting and we would need to get to our topic and then the group was cooperative. He needed multiple reminders and redirection thoughout for a few group members. He was in a humorous way pushing and saying it was \" censorship\" He talked about drinking alcohol and an extremely excessive amount of mountain dew in the past. When writer reacted he said it was passive suicidal ideation in the past. He does tend to push conversational boundaries in groups.  "

## 2022-06-10 NOTE — PLAN OF CARE
Problem: Sleep Disturbance  Goal: Adequate Sleep/Rest  Outcome: Ongoing, Progressing   Goal Outcome Evaluation:     Pt was awake pacing the hallway back and forth, listening to music with headphone at the start of the shift. Pt requested and received PRN Ativan at 0051. Pt appeared to have slept for 5 hours this shift.

## 2022-06-10 NOTE — PROGRESS NOTES
SPIRITUAL HEALTH SERVICES  SPIRITUAL ASSESSMENT Progress Note  [facility] ([facility]) [facility]     REFERRAL SOURCE: I did try to visit this morning patient Sam per follow up visit. However, on my both visit attempts pt was in a deep sleep. I informed to the unit staff about my visit attempts.    PLAN: I will remain open to provide spiritual care for the pt as needed.    Neelima Celaya M.Div. ([place]), M.Th., D.Min., [facility]  Staff   Pager [phone]

## 2022-06-10 NOTE — PLAN OF CARE
Problem: Pain Acute  Goal: Acceptable Pain Control and Functional Ability  Outcome: Ongoing, Progressing  Intervention: Prevent or Manage Pain   Problem: Pain Acute  Goal: Acceptable Pain Control and Functional Ability  Intervention: Prevent or Manage Pain  Problem: Suicidal Behavior  Goal: Suicidal Behavior is Absent or Managed  Outcome: Ongoing, Progressing    Problem: Sleep Disturbance  Goal: Adequate Sleep/Rest  Outcome: Ongoing, Progressing      Goal Outcome Evaluation:       Pt keeps pacing around the unit. He claims that he has real power to do or change things and he speaks with God. He has 3 mg melatonin and one mg ativan per pt request with HS med. He is calmer after taking his prn med. He is now sleeping. He displays no behavior problem.
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
## 2022-06-10 NOTE — PLAN OF CARE
Occupational Therapy Group Note      06/10/22 1228   Group Therapy Session   Group Attendance attended group session   Total Time patient participated (minutes) 20   Group Session Detail Topic: Sensory system education and exploration via Sensory Menu for Strong Sensory Input worksheet (proprioception, vestibular and deep pressure) for parsympathetic nervous system activation, sensory modulation, building mind-body awareness, building insight into total body wellness and coping with stress/sxs.   Patient Response/Contribution discussed personal experience with topic    Pt joined group late when he realized it was a topic he enjoys.  Pt was motivated to engage in discussion and explore sensory tools.  Pt presented with some pressured speech and an overall intensity but was a positive participant in the discussion.  Pt reported enjoying the weighted items for sensory regulation and calming.  Writer provided pt with lavender essential oil on a cotton ball at the end of group.  Pt expressed gratitude.  Presentation: pleasant, engaged, somewhat pressured but able to self-regulate.

## 2022-06-10 NOTE — PLAN OF CARE
Assessment/Intervention/Current Symptoms and Care Coordination  WR attempted team and reviewed chart.     Pt has court exam and hearing on June 21 at 9AM, current order expires June 28.    Pt is still displaying Sx of erum: pacing, hyper verbal.     Pt requested to meet with WR because he believes he hasn't had enough time to meet with a . WR met with pt for 30 minutes. He wanted to vent about the history of what brought him to his current situation in the hospital on a commitment. Pt believes he is not mentally ill, he was faking it a few years ago. He is religiously preoccupied, tangential, and believes that he is on a mission from God and that he is a part of the [facility].     Discharge Plan or Goal  Pending final disposition of commitment.  IRTS when stable    Barriers to Discharge   Pt requires stabilization and medication management, along with court process for recommitment.     Referral Status  Pending. IRTS referrals have not been made as of yet.     Legal Status  Committed with [facility], ends June 28, 2022

## 2022-06-11 PROCEDURE — 250N000013 HC RX MED GY IP 250 OP 250 PS 637: Performed by: STUDENT IN AN ORGANIZED HEALTH CARE EDUCATION/TRAINING PROGRAM

## 2022-06-11 PROCEDURE — H2032 ACTIVITY THERAPY, PER 15 MIN: HCPCS

## 2022-06-11 PROCEDURE — 124N000002 HC R&B MH UMMC

## 2022-06-11 RX ADMIN — OMEPRAZOLE 40 MG: 20 CAPSULE, DELAYED RELEASE ORAL at 08:05

## 2022-06-11 RX ADMIN — FLUPHENAZINE HYDROCHLORIDE 15 MG: 5 SOLUTION, CONCENTRATE ORAL at 20:10

## 2022-06-11 RX ADMIN — LORAZEPAM 1 MG: 1 TABLET ORAL at 20:10

## 2022-06-11 NOTE — PLAN OF CARE
Problem: Sleep Disturbance  Goal: Adequate Sleep/Rest  Outcome: Ongoing, Progressing   Goal Outcome Evaluation:     Pt has appeared asleep since the since the start of the shift. He appeared to have slept for 7 hours this shift. No prn given or requested.

## 2022-06-11 NOTE — PLAN OF CARE
"  Problem: Pain Acute  Goal: Acceptable Pain Control and Functional Ability  Outcome: Ongoing, Progressing   Problem: Suicidal Behavior  Goal: Suicidal Behavior is Absent or Managed  Outcome: Ongoing, Progressing   Problem: Cognitive Impairment (Psychotic Signs/Symptoms)  Goal: Optimal Cognitive Function (Psychotic Signs/Symptoms)  Outcome: Ongoing, Progressing   Goal Outcome Evaluation:       Pt reports that he wants his prn ativan to increase from one mg to 2 mg at bed time to help him  go to sleep faster. He states that he does not sleep well. Night shift nursing staff noted that he sleeps for 6 hours and he continues to sleep until 1130 AM. He wakes up just to take his AM med and use bathroom.  Pt says he is never be a depressed or anxious person and he has no raison to hurt himself or other people. He continues to say \"I'm not sick, I'm not depressed, I'm just meditating today. He denies all psych symptoms. He has been calm. He continues to refuse vital signs assessment and Covid test. He denies having any complaint.                 "

## 2022-06-12 PROCEDURE — 124N000002 HC R&B MH UMMC

## 2022-06-12 PROCEDURE — 250N000013 HC RX MED GY IP 250 OP 250 PS 637: Performed by: STUDENT IN AN ORGANIZED HEALTH CARE EDUCATION/TRAINING PROGRAM

## 2022-06-12 RX ADMIN — LORAZEPAM 1 MG: 1 TABLET ORAL at 19:49

## 2022-06-12 RX ADMIN — OMEPRAZOLE 40 MG: 20 CAPSULE, DELAYED RELEASE ORAL at 12:26

## 2022-06-12 RX ADMIN — FLUPHENAZINE HYDROCHLORIDE 15 MG: 5 SOLUTION, CONCENTRATE ORAL at 19:49

## 2022-06-12 RX ADMIN — LORAZEPAM 1 MG: 1 TABLET ORAL at 06:33

## 2022-06-12 ASSESSMENT — ACTIVITIES OF DAILY LIVING (ADL)
DRESS: INDEPENDENT
ORAL_HYGIENE: INDEPENDENT
HYGIENE/GROOMING: INDEPENDENT
LAUNDRY: WITH SUPERVISION

## 2022-06-12 NOTE — PLAN OF CARE
"  Problem: Sleep Disturbance  Goal: Adequate Sleep/Rest  Outcome: Ongoing, Progressing   Goal Outcome Evaluation:    Pt was observed sleeping soundly in bed at the start of the shift. Even and non-labored respirations noted. Pt slept for approximately 7 hours this night. At 0630 pt requested and received Ativan 1 Mg PO, pt said \"I didn't sleep all night, I have been resting in bed\".  "

## 2022-06-12 NOTE — PLAN OF CARE
"   06/11/22 1900   Group Therapy Session   Group Attendance attended group session   Time Session Began 1715   Time Session Ended 1810   Total Time patient participated (minutes) 45   Total # Attendees 7   Group Type expressive therapy  (art therapy)   Group Topic Covered emotions/expression   Group Session Detail self-compassion   Patient Response/Contribution cooperative with task;organized;discussed personal experience with topic   Patient Response Detail Sam presented as calm and cooperative. He engaged in group discussion about self-compassion, stating he is \"good at it\" but still feels \"lonely\" sometimes. He participated in making art representing compassion. First he made a drawing of a cannabis leaf and shared it with writer. Writer asked if he could make something more appropriate. He was receptive to this and chose to discard the drawing an make a new drawing of a heart growing form the ground. He shared it with the group and talked about feeling connected to nature. He interacted appropriately with peers.   Goal Outcome Evaluation:                      "

## 2022-06-12 NOTE — PLAN OF CARE
Problem: Manic Behavior Episode  Goal: Decreased Manic Symptoms  Outcome: Ongoing, Progressing  Note: Patient spends much of shift out of his room. He walks the black and often sits in the black talking with select peers. Remains appropriate with requests and activities on the unit. He requested to use his cell phone to make a payment on a bill. He had talked about this a few days. He was calm and timely in using the phone. Speech remains pressured. Spoke to staff that he had prayed to God and that She told him that he never asked for things so She gave him mercy. He then asked to speak with Buddha. He asked that there would be a separation between his heart and his justified anger. He said then he could finally be at peace.  Patient stated that he wanted this revelation passed on to team. He is med compliant. Denies SI and SIB. Joanna Fowler RN    Goal Outcome Evaluation:    Plan of Care Reviewed With: patient

## 2022-06-12 NOTE — PLAN OF CARE
"  Problem: Sleep Disturbance  Goal: Adequate Sleep/Rest  Outcome: Ongoing, Progressing   Problem: Suicidal Behavior  Goal: Suicidal Behavior is Absent or Managed  Outcome: Ongoing, Progressing   Problem: Cognitive Impairment (Psychotic Signs/Symptoms)  Goal: Optimal Cognitive Function (Psychotic Signs/Symptoms)  Outcome: Ongoing, Progressing   Goal Outcome Evaluation:      Pt was yelling this morning while the writer inform him that his breakfast is ready \"I don't want to eat, I don't want to eat, I'm going back to bed to sleep.  He refused to take his morning med on time and took med after 1200 pm. He got up after 1200 and came out for lunch. He ate his lunch 100. He went back to bed again immediately after finishing his meal. He denies pain and all psych symptoms. He contracts for safety. He continues to say he is meditating in his room he is not sick. Q 15 minutes. Q 15 minutes safety checks is ongoing per unit protocol.                 "

## 2022-06-13 PROCEDURE — 250N000013 HC RX MED GY IP 250 OP 250 PS 637: Performed by: STUDENT IN AN ORGANIZED HEALTH CARE EDUCATION/TRAINING PROGRAM

## 2022-06-13 PROCEDURE — G0177 OPPS/PHP; TRAIN & EDUC SERV: HCPCS

## 2022-06-13 PROCEDURE — H2032 ACTIVITY THERAPY, PER 15 MIN: HCPCS

## 2022-06-13 PROCEDURE — 99233 SBSQ HOSP IP/OBS HIGH 50: CPT | Mod: GC | Performed by: PSYCHIATRY & NEUROLOGY

## 2022-06-13 PROCEDURE — 124N000002 HC R&B MH UMMC

## 2022-06-13 RX ADMIN — OMEPRAZOLE 40 MG: 20 CAPSULE, DELAYED RELEASE ORAL at 11:41

## 2022-06-13 RX ADMIN — FLUPHENAZINE HYDROCHLORIDE 15 MG: 5 SOLUTION, CONCENTRATE ORAL at 20:15

## 2022-06-13 RX ADMIN — MELATONIN TAB 3 MG 3 MG: 3 TAB at 20:54

## 2022-06-13 RX ADMIN — LORAZEPAM 1 MG: 1 TABLET ORAL at 20:15

## 2022-06-13 ASSESSMENT — ACTIVITIES OF DAILY LIVING (ADL)
DRESS: INDEPENDENT
HYGIENE/GROOMING: INDEPENDENT
LAUNDRY: WITH SUPERVISION
ORAL_HYGIENE: INDEPENDENT
HYGIENE/GROOMING: INDEPENDENT
ORAL_HYGIENE: INDEPENDENT
DRESS: INDEPENDENT

## 2022-06-13 NOTE — PLAN OF CARE
Occupational Therapy Group Note      06/13/22 1435   Group Therapy Session   Group Attendance attended group session   Time Session Began 0115   Time Session Ended 0200   Total Time patient participated (minutes) 45   Total # Attendees 6   Group Type recreation   Group Topic Covered cognitive activities;leisure exploration/use of leisure time;structured socialization   Group Session Detail Topic: [employer] game for attention,concentration, new learning, follow through, visuospatial recognition, opportunity for social engagement, leisure education/exploration, reality orientation and managing mood/symptoms.   Patient Response/Contribution cooperative with task;organized    Pt joined group and was motivated by opportunity to learn a novel game.  Pt was able to learn and follow through with game instructions without difficulty.  Pt played the game with consistent strategy.  Was supportive of peers throughout.  Pt was able to maintain attention and remain organized for duration of game.  Appears to benefit from structured socialization.  Affect: congruent.  Mood: content, pleasant.

## 2022-06-13 NOTE — PLAN OF CARE
Assessment/Intervention/Current Symptoms and Care Coordination:  Attend Team Meeting  Review Chart            Discharge Plan or Goal:  Possible discharge next week after Monday injection.     Pending final disposition of commitment.  IRTS when stable     Barriers to Discharge   Pt requires stabilization and medication management, along with court process for recommitment.      Referral Status  Pending. IRTS referrals have not been made as of yet.      Legal Status  Committed with [facility], ends June 28, 2022

## 2022-06-13 NOTE — PLAN OF CARE
Problem: Manic Behavior Episode  Goal: Decreased Manic Symptoms  Outcome: Ongoing, Progressing    Pt is pleasant on approach. He reports he is doing great. Stated he talked to God on Friday and requested to speak with Buddha. He stated she [God] granted his request and he asked Buddha to release him from his anger. Pt stated from that moment on he was at peace. Pt slept until 1100. Up pacing the halls with headphones and socializing with a peer this afternoon. Will continue to monitor and assist as needed.

## 2022-06-13 NOTE — PLAN OF CARE
Problem: Manic Behavior Episode  Goal: Decreased Manic Symptoms  Outcome: Ongoing, Progressing  Note: Less active out in the milieu this shift. Spent most of day and evening in his room. Minimal pacing. Made few requests from staff today. Affect is flat and blunted. Mood is calm. Ativan at hs was his only prn requested this evening. States that he wants the team to know that he wants 2mg of ativan at hs and needs none at any other time. He is med compliant. Denies SI and SIB. Joanna Fowler RN     Goal Outcome Evaluation:    Plan of Care Reviewed With: patient

## 2022-06-13 NOTE — PROGRESS NOTES
Pt made a comment how he wouldn't come to group to get the group off topic, writer invited him and told him he knows how to be appropriate and on topic and he is welcome to attend. He was very restless and said he had to use the restroom but then did not return.

## 2022-06-13 NOTE — PLAN OF CARE
Problem: Sleep Disturbance  Goal: Adequate Sleep/Rest  Outcome: Ongoing, Progressing   Goal Outcome Evaluation:    Pt was observed sleeping at the start of the shift. No PRN medication given or requested. Pt appeared to have slept for 7 hours. No behavioral event noted.
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
## 2022-06-13 NOTE — PLAN OF CARE
"  Problem: Manic Behavior Episode  Goal: Decreased Manic Symptoms  Outcome: Ongoing, Progressing     Problem: Pain Acute  Goal: Acceptable Pain Control and Functional Ability  Outcome: Ongoing, Progressing     Problem: Suicidal Behavior  Goal: Suicidal Behavior is Absent or Managed  Outcome: Ongoing, Progressing   Goal Outcome Evaluation:  Pt was met in bed resting. Pt said he was talking to God and God said to him \"you really don't ask me for any help even though you talk to me everyday. You never ask for mercy and I have to ask for you.\"  Pt said he address God as a female and he is finally at peace since Friday after talking with Buddha. Pt did not attend groups but has been visible in the milieu, pacing intermittently and social with selective peers. Pt received prn ativan with schedule medication as request to manage any possible lingering anxiety/restlessness. Pt is pleasant and cooperative. No acute medical or behavioral concerns. Pt denies SI/HI, SIB and all mental health concerns.                      "

## 2022-06-14 PROCEDURE — 99232 SBSQ HOSP IP/OBS MODERATE 35: CPT | Mod: GC | Performed by: PSYCHIATRY & NEUROLOGY

## 2022-06-14 PROCEDURE — 124N000002 HC R&B MH UMMC

## 2022-06-14 PROCEDURE — 250N000013 HC RX MED GY IP 250 OP 250 PS 637: Performed by: STUDENT IN AN ORGANIZED HEALTH CARE EDUCATION/TRAINING PROGRAM

## 2022-06-14 RX ADMIN — FLUPHENAZINE HYDROCHLORIDE 15 MG: 5 SOLUTION, CONCENTRATE ORAL at 20:02

## 2022-06-14 RX ADMIN — LORAZEPAM 1 MG: 1 TABLET ORAL at 20:02

## 2022-06-14 RX ADMIN — LORAZEPAM 1 MG: 1 TABLET ORAL at 05:16

## 2022-06-14 RX ADMIN — OMEPRAZOLE 40 MG: 20 CAPSULE, DELAYED RELEASE ORAL at 11:38

## 2022-06-14 ASSESSMENT — ACTIVITIES OF DAILY LIVING (ADL)
ORAL_HYGIENE: INDEPENDENT
HYGIENE/GROOMING: INDEPENDENT
LAUNDRY: WITH SUPERVISION
ORAL_HYGIENE: INDEPENDENT
HYGIENE/GROOMING: INDEPENDENT
DRESS: SCRUBS (BEHAVIORAL HEALTH)
DRESS: INDEPENDENT

## 2022-06-14 NOTE — PLAN OF CARE
"Pt appeared to sleep 6 hours- noted awake on two separate occassions. Pt out to desk to request ativan for sleep, given @0516. Pt reports not sleeping well and estimated getting only 2 hours of sleep. Encouraged to voice needs and sleep disturbance. Reports waking from disturbing nightmares. Pt reports that the ativan \"doesn't touch me\"    Problem: Sleep Disturbance  Goal: Adequate Sleep/Rest  Outcome: Ongoing, Not Progressing    "

## 2022-06-14 NOTE — PLAN OF CARE
"  Problem: Pain Acute  Goal: Acceptable Pain Control and Functional Ability  Outcome: Ongoing, Progressing   Problem: Suicidal Behavior  Goal: Suicidal Behavior is Absent or Managed  Outcome: Ongoing, Progressing   Goal Outcome Evaluation:  Pt came out of his room at 1135 and start pacing on the unit. He claims that the nurses document that he is sleeping well but that is not true because he stays in bed. He reports that he slept last night for about two hours. He continuing to say that he has headache because of his poor sleeping habit. He request to have 2 mg ativan at bed time. For his headache he declined prn pain med. Pt said that he is not sick. He denies all psych symptoms. He contracted for safety. Pt claim he has PTSD from melatonin and will not take it tonight. He also says \"the best way to describe Melatonin is an acid to sleep\". \"I was fighting with zombies in my dream last night in my dreams pt says\".                  "

## 2022-06-14 NOTE — PLAN OF CARE
"Assessment/Intervention/Current Symptoms and Care Coordination  WR attended team and reviewed chart  Pt is continuing to speak to god and has been pacing a lot, denying all mental health symptoms and asking staff to tell the doctor that he isn't sick.     Pt requested to meet with WR. He explained that he is extremely bored in the hospital and doesn't think he belongs here. He complained of lack of sleep, stating that the staff are documenting he sleeps throughout the night but pt is actually meditating when they do their checks. He stated that he needs his \"indica\" so that he can sleep and went on to explain to  that he travels to [place] or [place] each month to go to dispensaries because it is natural medicine to him. He mentioned his communication with God and Budda. He requested that tomorrow CTC call his dad to discuss him going home rather than IRTS.     Discharge Plan or Goal  Possible discharge next week after Monday injection.  Pending final disposition of commitment.  Possible IRTS when stable    Barriers to Discharge   Pt is still experiencing psychosis and medication adjustments are continuing to be determined     Referral Status  Pending    Legal Status  Committed with [facility], ends June 28, 2022                      "
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
## 2022-06-15 LAB — SARS-COV-2 RNA RESP QL NAA+PROBE: NEGATIVE

## 2022-06-15 PROCEDURE — 87635 SARS-COV-2 COVID-19 AMP PRB: CPT | Performed by: STUDENT IN AN ORGANIZED HEALTH CARE EDUCATION/TRAINING PROGRAM

## 2022-06-15 PROCEDURE — 250N000013 HC RX MED GY IP 250 OP 250 PS 637: Performed by: STUDENT IN AN ORGANIZED HEALTH CARE EDUCATION/TRAINING PROGRAM

## 2022-06-15 PROCEDURE — H2032 ACTIVITY THERAPY, PER 15 MIN: HCPCS

## 2022-06-15 PROCEDURE — 124N000002 HC R&B MH UMMC

## 2022-06-15 PROCEDURE — 99232 SBSQ HOSP IP/OBS MODERATE 35: CPT | Mod: GC | Performed by: PSYCHIATRY & NEUROLOGY

## 2022-06-15 RX ADMIN — FLUPHENAZINE HYDROCHLORIDE 15 MG: 5 SOLUTION, CONCENTRATE ORAL at 19:55

## 2022-06-15 RX ADMIN — OMEPRAZOLE 40 MG: 20 CAPSULE, DELAYED RELEASE ORAL at 08:24

## 2022-06-15 ASSESSMENT — ACTIVITIES OF DAILY LIVING (ADL)
ORAL_HYGIENE: INDEPENDENT
LAUNDRY: WITH SUPERVISION
LAUNDRY: UNABLE TO COMPLETE
DRESS: INDEPENDENT;SCRUBS (BEHAVIORAL HEALTH)
ORAL_HYGIENE: INDEPENDENT
DRESS: INDEPENDENT
HYGIENE/GROOMING: INDEPENDENT
HYGIENE/GROOMING: INDEPENDENT

## 2022-06-15 NOTE — PLAN OF CARE
"   06/15/22 1504   Group Therapy Session   Group Attendance attended group session   Time Session Began 1300   Time Session Ended 1350   Total Time patient participated (minutes) 25 (No Charge)   Total # Attendees 7   Group Type Occupational Therapy   Group Topic Covered balanced lifestyle;coping skills/lifestyle management;leisure exploration/use of leisure time;relaxation techniques   Group Session Detail OT Clinic Group   Patient Response Detail Intervention: OT Clinic with 2 peers     Patient Response: Pt joined clinic group stating \"they told me I need to go to group, so here I am. What can I work on?\". Pt was oriented to projects available to them, electing to work on a simple coloring project. Pt was setup with project and materials after which they worked on project for the duration of their time in group. Pt was social with writer and peers during time in group, engaging in conversation and making jokes with peers. Thanked writer for their time on the way out of group.     Mood/Affect: Bright, Pleasant     Plan: Patient encouraged to maintain attendance for continued ongoing support in working towards occupational therapy goals to support overall treatment/care.          "

## 2022-06-15 NOTE — PLAN OF CARE
Assessment/Intervention/Current Symptoms and Care Coordination      Attended team meeting and reviewed chart notes.    Writer spoke with pt''s father Edward regarding pt's care and discharge planning. Edward reports that they have tried IRTs before and pt declined to stay. Edward ended up taking pt home. They tried ACT and pt again declined services stating he does not have a mental illness. The parents are frustrated with the system as pt ends up back in the hospital after not following through. Edward is open for suggestions. They would like to get a call from the doctor and have a family meeting to discuss with pt consequences of not following through with the discharge plan. Edward also wondered if anyone was telling pt he has a mental illness and will need to stay on medications. We touched briefly on group home referrals and Edward stated 'What would prevent him from leaving?' We discussed that pt can always return home and he knows this. Writer will call Edward again tomorrow after talking with the team.    Discharge Plan or Goal  Home versus IRTs      Barriers to Discharge   Needs stabilization      Referral Status  None made    Legal Status  Pt is committed with Jo order-pt has recommit hearing on June 21st.

## 2022-06-15 NOTE — PLAN OF CARE
"  Problem: Pain Acute  Goal: Acceptable Pain Control and Functional Ability  Outcome: Ongoing, Progressing  Intervention: Develop Pain Management Plan  Recent Flowsheet Documentation  Taken 6/14/2022 1700 by Winifred Curry RN  Pain Management Interventions: declines  Intervention: Prevent or Manage Pain  Recent Flowsheet Documentation  Taken 6/14/2022 1709 by Winifred Curry RN  Sensory Stimulation Regulation: music on  Medication Review/Management: medications reviewed     Problem: Manic Behavior Episode  Goal: Decreased Manic Symptoms  Outcome: Ongoing, Progressing  Intervention: Promote Mood Equilibrium  Recent Flowsheet Documentation  Taken 6/14/2022 1709 by Winifred Curry RN  Sensory Stimulation Regulation: music on     Problem: Suicidal Behavior  Goal: Suicidal Behavior is Absent or Managed  Outcome: Ongoing, Progressing  Flowsheets (Taken 6/14/2022 2120)  Mutually Determined Action Steps (Suicidal Behavior Absent/Managed): verbalizes safety check rationale  Intervention: Provide Immediate and Ongoing Protective Physical Environment  Recent Flowsheet Documentation  Taken 6/14/2022 1709 by Winifred Curry RN  Safe Transition Promotion: protective factors promoted   Goal Outcome Evaluation:    Plan of Care Reviewed With: patient     Pt was met in his room, he said he was napping. Pt was later visible in the milieu. He as calm and pleasant on approach. Paces back and forth on the unit with head phone on. Pt did not attend groups. He was social with peers and staff. Pt continues perseverating on not sleeping well at night and the need to increase his ativan dose to 2 mg. Pt encouraged to alert night staff if having difficulties staying asleep. Pt said current ativan dose is not effective \"ativan is too light for me.\" He complained melatonin gives him night mare \"vivid dreams and I can smell it.\" Pt said he will like melatonin substitute for ativan 2 mg. He said at home he takes marijuana and that helps " him sleep. Pt talked about being a 37 years old virgin and he's looking for a wife. Pt was redirected and cooperated with redirection given. Pt presents with grandiosity pressured and disorganized speech, mostly calm mood but mildly elevated at times. Pt denies all psych symptoms but requested for ativan which he said is to help him sleep. Pt was medication adherent. Prn ativan given with hs medication. pt denies any mental health concerns. No behavioral out-burst.
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
## 2022-06-15 NOTE — PLAN OF CARE
Pt appeared to sleep 3.75 hours. Pt up to announce he will not be taking any more ativan- no PRNs given this shift. Pt given snacks and paced the hallway before going to sleep for a short time. Pt reported having vivid dreams and experiencing inception where he experienced time at a slower rate. Pt hyper verbal but calm.      Problem: Sleep Disturbance  Goal: Adequate Sleep/Rest  Outcome: Ongoing, Not Progressing

## 2022-06-16 PROCEDURE — 124N000002 HC R&B MH UMMC

## 2022-06-16 PROCEDURE — 99232 SBSQ HOSP IP/OBS MODERATE 35: CPT | Mod: GC | Performed by: PSYCHIATRY & NEUROLOGY

## 2022-06-16 PROCEDURE — G0177 OPPS/PHP; TRAIN & EDUC SERV: HCPCS

## 2022-06-16 PROCEDURE — 90853 GROUP PSYCHOTHERAPY: CPT

## 2022-06-16 PROCEDURE — 250N000013 HC RX MED GY IP 250 OP 250 PS 637: Performed by: STUDENT IN AN ORGANIZED HEALTH CARE EDUCATION/TRAINING PROGRAM

## 2022-06-16 RX ADMIN — FLUPHENAZINE HYDROCHLORIDE 15 MG: 5 SOLUTION, CONCENTRATE ORAL at 20:15

## 2022-06-16 RX ADMIN — OMEPRAZOLE 40 MG: 20 CAPSULE, DELAYED RELEASE ORAL at 08:43

## 2022-06-16 ASSESSMENT — ACTIVITIES OF DAILY LIVING (ADL)
ORAL_HYGIENE: INDEPENDENT
DRESS: INDEPENDENT
HYGIENE/GROOMING: INDEPENDENT
LAUNDRY: WITH SUPERVISION
ORAL_HYGIENE: INDEPENDENT
HYGIENE/GROOMING: INDEPENDENT
DRESS: INDEPENDENT

## 2022-06-16 NOTE — PLAN OF CARE
"  Problem: Manic Behavior Episode  Goal: Decreased Manic Symptoms  Outcome: Ongoing, Progressing  Note: Requesting to have fitbit brought in by father \"so I don't walk too much.\" He also requests the team know that he hopes \"they are not planning to go above the 37.5mg of the long acting.This is as good as it gets! I am high energy and they cannot silence the voice of God.\" Patient walking in the black more this evening. Speech is increasingly pressured this shift. Stated that he is not taking ativan for sleep as he is \"planning to withdraw from it. \" Joanna Fowler RN       Goal Outcome Evaluation:    Plan of Care Reviewed With: patient                 "
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
## 2022-06-16 NOTE — PLAN OF CARE
Assessment/Intervention/Current Symptoms and Care Coordination    Attended team meeting and reviewed chart notes.    Pt requested to speak with writer in his room. Pt stated he does not want to take oral medications after his injection as Dr. Swan wants him to. He stated the 50 mil is too much for him as he will sleep all day. Pt stated he wants discharge after court next week. Then pt stated that he needs to discharge so he can have marijuana available for him. Pt displayed pressured speech during interaction.       Discharge Plan or Goal  Home with parents versus IRTs      Barriers to Discharge   Needs stabilization      Referral Status  None made      Legal Status  Pt is committed with Jo order  Pt's recommitment hearing is Tuesday June 21st

## 2022-06-16 NOTE — PLAN OF CARE
Problem: Sleep Disturbance  Goal: Adequate Sleep/Rest  Outcome: Ongoing, Progressing   Goal Outcome Evaluation:    Pt slept for 6.25 hours this shift. No PRN given or requested. No behavioral event noted.

## 2022-06-16 NOTE — PROGRESS NOTES
06/15/22 1900   Group Therapy Session   Group Attendance attended group session   Time Session Began 1715   Time Session Ended 1805   Total Time patient participated (minutes) 50   Total # Attendees 7   Group Type recreation   Group Topic Covered relaxation techniques   Group Session Detail stress reduction   Patient Response/Contribution cooperative with task;discussed personal experience with topic   Patient Response Detail Pt actively participated in a structured Therapeutic Recreation group with a focus on leisure participation, socializing, and exercise. Pt participated in the guided exercise for the full duration of the group. Pt followed along, engaged in the guided chair exercise routine and added to the discussion prompts throughout the routine.  Pt was encouraged to use positive imagery with the deep breathing and stretching to foster relaxation, improves focus, and reduce stress.

## 2022-06-16 NOTE — PLAN OF CARE
Occupational Therapy Group Note      06/16/22 1457   Group Therapy Session   Group Attendance attended group session   Time Session Began 1015   Time Session Ended 1200   Total Time patient participated (minutes) 60   Total # Attendees 8   Group Type expressive therapy   Group Topic Covered coping skills/lifestyle management;emotions/expression   Group Session Detail Topic: OT Open Clinic (100 minutes) for creative expression, promoting autonomy, building sense of self-worth, coping with stress/symptoms and opportunity to exercise cognitive skills (i.e. initiation, planning, organization, sequencing, sustained attention, follow-through, termination of task and overall safety awareness).      Patient Response/Contribution cooperative with task;discussed personal experience with topic    Pt joined group and initially shared that he is feeling very happy but high anxiety because he found out that the house next to his best friend is for sale and he believes he can afford it.  Pt requested items to help with anxiety: weighted collar and guided meditation track.  Pt engaged in these coping skills while coloring, which appeared to assist with anxiety management as pt was able to focus on his project for ~55 minutes.  Pt expressed gratitude for group before he left.  Affect: anxiety but improving.

## 2022-06-16 NOTE — PLAN OF CARE
"Occupational Therapy Group Note      06/16/22 1500   Group Therapy Session   Group Attendance attended group session   Total Time patient participated (minutes) 25   Group Session Detail Topic: Education on relapse prevention and making personal Relapse Prevention Plans to support recovery and build insight into triggers, early warning signs, coping skills and social supports.    Patient Response/Contribution cooperative with task;expressed reluctance to alter behaviors    Pt joined group and engaged in discussion but opted out of completing a relapse prevention plan worksheet for himself.  Pt shared that for him, a relapse would be being brought to the hospital against his will again. Pt was able to ID coping skills that help him: music, meditation and walking.  Pt used a gesture to add marijuana to this list.  Pt left group early but came back at the end of group to talk with writer.  Pt presented with increased anxiety at this time.  He asked, \"Will you do a favor for me and tell my doctor that I'm ready for discharge?\"  Pt went on to say that he feels \"threatened\" by his doctor and that he is upset that they are increasing his medications.  He said his doctor is \"not thinking about my future but my demise,\" and \"He [doctor] is trying to mute the voice of God, which he will never do.\"  Pt said that he, referring to himself, is a very special person.  Pt then intercepted writer again later and said he is a hippy and he smokes weed every day and his doctor is not going to \"fix\" him in the hospital.         "

## 2022-06-17 PROCEDURE — 124N000002 HC R&B MH UMMC

## 2022-06-17 PROCEDURE — 99232 SBSQ HOSP IP/OBS MODERATE 35: CPT | Mod: GC | Performed by: PSYCHIATRY & NEUROLOGY

## 2022-06-17 PROCEDURE — 250N000013 HC RX MED GY IP 250 OP 250 PS 637: Performed by: STUDENT IN AN ORGANIZED HEALTH CARE EDUCATION/TRAINING PROGRAM

## 2022-06-17 PROCEDURE — H2032 ACTIVITY THERAPY, PER 15 MIN: HCPCS

## 2022-06-17 RX ADMIN — MELATONIN TAB 3 MG 3 MG: 3 TAB at 20:21

## 2022-06-17 RX ADMIN — OMEPRAZOLE 40 MG: 20 CAPSULE, DELAYED RELEASE ORAL at 12:07

## 2022-06-17 RX ADMIN — LORAZEPAM 1 MG: 1 TABLET ORAL at 20:21

## 2022-06-17 RX ADMIN — FLUPHENAZINE HYDROCHLORIDE 15 MG: 5 SOLUTION, CONCENTRATE ORAL at 20:21

## 2022-06-17 RX ADMIN — LORAZEPAM 1 MG: 1 TABLET ORAL at 05:22

## 2022-06-17 ASSESSMENT — ACTIVITIES OF DAILY LIVING (ADL)
HYGIENE/GROOMING: INDEPENDENT
HYGIENE/GROOMING: INDEPENDENT
ORAL_HYGIENE: INDEPENDENT
LAUNDRY: WITH SUPERVISION
DRESS: INDEPENDENT
LAUNDRY: WITH SUPERVISION
ORAL_HYGIENE: INDEPENDENT
DRESS: INDEPENDENT

## 2022-06-17 NOTE — PLAN OF CARE
Assessment/Intervention/Current Symptoms and Care Coordination      Attended team meeting and reviewed chart notes.    Pt continues to display symptoms throughout the day. Pt was able to meet with the lyly in which pt was appreciative.       Discharge Plan or Goal  Discharge to home or IRTs      Barriers to Discharge   Needs stabilization      Referral Status  None made      Legal Status  Pt is committed with Jo- pt will need PD upon discharge.  Pt's recommit hearing is schedule June 21st.

## 2022-06-17 NOTE — PLAN OF CARE
"Goal Outcome Evaluation:    Plan of Care Reviewed With: patient     Problem: Manic Behavior Episode  Goal: Decreased Manic Symptoms  Outcome: Ongoing, Progressing     Pt is labile on approach.Tense after meeting with provider. Informed writer that provider was a \"prick\" because pt stated he repeated what he said when he asked for ativan to be increased and did not answer him. He attended groups, paced the halls with headphones and met with lyly during the shift. Will continue to monitor and assist as needed.      "

## 2022-06-17 NOTE — PLAN OF CARE
Occupational Therapy Group Note      06/17/22 1456   Group Therapy Session   Group Attendance attended group session   Total Time patient participated (minutes) 25   Group Session Detail Topic: OT Open Clinic for creative expression, promoting autonomy, building sense of self-worth, coping with stress/symptoms and opportunity to exercise cognitive skills (i.e. initiation, planning, organization, sequencing, sustained attention, follow-through, termination of task and overall safety awareness).      Patient Response/Contribution cooperative with task    Pt joined group and requested to do a progressive muscle relaxation activity.  Pt set up a yoga mat in the corner of the room and writer set pt up with a recording.  Pt listened to recording for 20 minutes, cleaned up the space and left the room.  Pt thanked writer and said the activity was helpful.

## 2022-06-17 NOTE — PLAN OF CARE
06/16/22 1944   Group Therapy Session   Group Attendance attended group session   Time Session Began 1715   Time Session Ended 1805   Total Time patient participated (minutes) 50   Total # Attendees 5   Group Type psychotherapeutic   Group Topic Covered coping skills/lifestyle management   Group Session Detail Group participated in discussing the self-care wheel. Discussed it generally as a group; the importance of balancing the different areas, what each one might look like for the individual, how they care for themselves within that wheel.   Patient Response/Contribution cooperative with task;discussed personal experience with topic   Patient Response Detail PT participated actively, shared personal experiences. At times appeared mildly grandiose. Pt continued to express frustration with being inpatient and not being outside

## 2022-06-17 NOTE — PROGRESS NOTES
"SPIRITUAL HEALTH SERVICES  SPIRITUAL ASSESSMENT Progress Note  [facility] ([facility]     REFERRAL SOURCE: I did visit since this morning patient Sam per [facility] consult order. However, on my both visit attempts pt was asleep and I did not disturb him just to talk from his sleep and later on he appreciated that. I introduced myself as the unit  and shared all the info regarding the [facility]. On my third visit attempts pt was available for one to one  visit. Pt was so glad when he sees this  and he said, \"I was looking professional  for some spiritual support. I want to confess my sins and trespasses against people and God. However, I am not ready to tell you my personal story now but for sure I will tell you tomorrow.\" While he was talking with me, he just keep saying loudly ' I am so sorry not telling you now my sins and personal story' but I told him that if he is not ready that I am not pushing him to say anything. I told him that I can wait until he gets ready to talk and he agreed on that. Pt want to say something but at the same time he mentioned that he is not ready to talk and he keep saying that over and over again. When he cry loudly, as the healing therapy, I did let him cry until he finish. By the time he finish, his crying and he start taking. At the end of our conversation, and after I calm him down, as the closing, I offered him a prayer.    PLAN: I will remain open to provide spiritual care for the pt as needed.    Neelima Celaya M.Div. ([place]), M.Th., D.Min., [facility]  Staff   Pager [phone]    "

## 2022-06-17 NOTE — PLAN OF CARE
"Pt sleeping at start of shift. Pt out to lounge to request snacks and returned to bed. Later came quickly out of his bedroom yelling \"I am freaking out, I need an ativan, I am being forced to take it because I have no choice, I can't sleep\". Pt states he needs real medicine including marijuana for sleep. Given PRN ativan per request and room temperature adjusted for comfort. Pt making frequent requests to add information to his chart including: \"I don't need an increased prolixin dose it is already therapeutic, I need 2mg ativan\". Appeared to sleep 5.5 hours, however pt states it was much less.     Problem: Manic Behavior Episode  Goal: Decreased Manic Symptoms  Outcome: Ongoing, Not Progressing     Problem: Sleep Disturbance  Goal: Adequate Sleep/Rest  Outcome: Ongoing, Not Progressing      "

## 2022-06-17 NOTE — PLAN OF CARE
Occupational Therapy Group Note      06/17/22 1212   Group Therapy Session   Group Attendance attended group session   Total Time patient participated (minutes) 35   Group Session Detail Topic: [facility] [place] [facility] for total body wellness, activity activation, structured socialization, building sense of self-worth and coping with stress and symptoms.   Patient Response/Contribution cooperative with task;discussed personal experience with topic;listened actively    Pt joined group and was an active and positive participant; pt engaged in a prosocial manner with staff and peers during discussion and engaged in all physical wellness activities; pt had a positive response to body scanning and deep breathing.

## 2022-06-17 NOTE — PLAN OF CARE
"  Problem: Manic Behavior Episode  Goal: Decreased Manic Symptoms  Outcome: Ongoing, Progressing  Intervention: Promote Mood Equilibrium  Recent Flowsheet Documentation  Taken 6/16/2022 1729 by Winifred Curry RN  Behavior Management: impulse control promoted  Sensory Stimulation Regulation: music on     Problem: Pain Acute  Goal: Acceptable Pain Control and Functional Ability  Outcome: Ongoing, Progressing  Intervention: Prevent or Manage Pain  Recent Flowsheet Documentation  Taken 6/16/2022 1729 by Winifred Curry RN  Sensory Stimulation Regulation: music on  Bowel Elimination Promotion: adequate fluid intake promoted  Medication Review/Management: medications reviewed  Intervention: Optimize Psychosocial Wellbeing  Recent Flowsheet Documentation  Taken 6/16/2022 1729 by Winifred Curry RN  Spiritual Activities Assistance:    music provided    hope instilled     Problem: Suicidal Behavior  Goal: Suicidal Behavior is Absent or Managed  Outcome: Ongoing, Progressing  Intervention: Provide Immediate and Ongoing Protective Physical Environment  Recent Flowsheet Documentation  Taken 6/16/2022 1729 by Winifred Curry RN  Safe Transition Promotion: protective factors promoted   Goal Outcome Evaluation:    Plan of Care Reviewed With: patient     Pt was in the black-way pacing when he approached writer and asked to speak with staff in his room. Pt states \"I feel like the Doctor is trying to mute the voice of God.\" Pt said he feels threatened by the Doctor because the doctor has not set a date to discharg him home, where he could sleep well. He said, at home he could get access to mariguana which helps him to sleep, and sleeps on his bed and feel well rested. He said the bed here is very \"uncomfortable\" for him. Pt continues to perseverate about his medication. He said he does not need ativan anymore, that ativan keeps him awake at night and he wants to be off of Prolixin. He stated \"I believe the  is not doing " "what is best for me. Prolixin is a poison to my body.\" Pt said he will like to be discharge  home between 6/22 and 6/24.  At round 1640, pt requested for spiritual service. Order placed in for a lyly service. Pt spent considerable amount of time pacing  back and forth on the hallway. No out burst behavior observed. He was compliant with his scheduled medication. Pt had adequate appetite and fluid intake. Pt attended groups, he was social with selective peer. No request for any prn medication. Pt denies all mental health concerns.                    "
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
## 2022-06-18 PROCEDURE — 250N000013 HC RX MED GY IP 250 OP 250 PS 637: Performed by: STUDENT IN AN ORGANIZED HEALTH CARE EDUCATION/TRAINING PROGRAM

## 2022-06-18 PROCEDURE — 124N000002 HC R&B MH UMMC

## 2022-06-18 RX ADMIN — LORAZEPAM 1 MG: 1 TABLET ORAL at 19:59

## 2022-06-18 RX ADMIN — MELATONIN TAB 3 MG 3 MG: 3 TAB at 19:59

## 2022-06-18 RX ADMIN — OMEPRAZOLE 40 MG: 20 CAPSULE, DELAYED RELEASE ORAL at 09:46

## 2022-06-18 RX ADMIN — FLUPHENAZINE HYDROCHLORIDE 15 MG: 5 SOLUTION, CONCENTRATE ORAL at 20:00

## 2022-06-18 ASSESSMENT — ACTIVITIES OF DAILY LIVING (ADL)
LAUNDRY: WITH SUPERVISION
DRESS: INDEPENDENT
HYGIENE/GROOMING: INDEPENDENT
LAUNDRY: WITH SUPERVISION
ORAL_HYGIENE: INDEPENDENT
DRESS: INDEPENDENT
ORAL_HYGIENE: INDEPENDENT
HYGIENE/GROOMING: INDEPENDENT

## 2022-06-18 NOTE — PLAN OF CARE
"Goal Outcome Evaluation:    Plan of Care Reviewed With: patient     Problem: Manic Behavior Episode  Goal: Decreased Manic Symptoms  Outcome: Ongoing, Not Progressing     Pt is pleasant on approach. Continues to have pressured speech and grandiose delusions. Frequently comes up to the nursing station to make random comments such as \"saying what I say here is going to help me avoid the jujitsu table. I don't want to be dissected,\" \"I got their [the NELLY's] attention and broke the sound barrier by screaming sos in my car. Screaming bloody murder gets them listening. Every car after 1980 has a microphone in it,\" \"I've actually had a lot of hyponosis prevention training, but you can welcome it and, it's an easy thing to do,\" and \"remember to youtube ASMR reiki healing; the more tired you are the more receptive you are to it.\"     Pt asked to speak with writer and stated that God told him that it's his mission to preach to the [facility] and tell them to stop sending the good ones to [facility] by putting a rifle in their hands. \"Only a few out of thousands of marines go to [facility] because they've killed people.\" Pt stated it's overwhelming being the one that God speaks through and it's a heavy burden to carry.  He paced the halls with headphones, socialized with peers, talked on the phone and encouraged a staff member to quit smoking during the shift. Med compliant. Will continue to monitor and assist as needed.  "

## 2022-06-18 NOTE — PLAN OF CARE
"Goal Outcome Evaluation:    Plan of Care Reviewed With: patient     Problem: Manic Behavior Episode  Goal: Decreased Manic Symptoms  Outcome: Ongoing, Progressing     Pt states he is not doing good today. He is \"bored out of his mind.\" Handed writer a note that said, \"No surrender. No retreat. No regret. No remorse. First in, last out. No rank. No file. God calls me the Saint of reason.\" Pt stated, \"this is my motto. I am supposed to leave it at every hospital I go to.\" Pt paced the black with headphones during the shift. Will continue to monitor and assist as needed.          "

## 2022-06-18 NOTE — PLAN OF CARE
Pt appeared to sleep 7 hours. Pt not out of room to voice and concerns. No PRNs given or requested. No medical or behavioral events this shift.      Problem: Sleep Disturbance  Goal: Adequate Sleep/Rest  Outcome: Ongoing, Progressing

## 2022-06-18 NOTE — PROGRESS NOTES
06/17/22 1900   Group Therapy Session   Group Attendance attended group session   Time Session Began 1715   Time Session Ended 1805   Total Time patient participated (minutes) 50   Total # Attendees 6   Group Type recreation   Group Topic Covered leisure exploration/use of leisure time   Group Session Detail TR leisure group   Patient Response/Contribution cooperative with task   Patient Response Detail Pt participated in Therapeutic Recreation group with focus on leisure participation, communication skills, and critical thinking. Engaged and focused in the group recreational activity via a group game.  Pt was a full participant throughout the entire duration of group.  Appropriately shared sense of humor with peers during group and appeared to brighten with social interaction. Prior to anyone else entering group, pt was talking about his hobbies and interests, mentioning smoking pot many occasions, as a hobby and what he self regulates for treatment. Pt stated that's half of what is needed for him and the doctors only give meds that he doesn't take outside of the hospital.

## 2022-06-19 PROCEDURE — H2032 ACTIVITY THERAPY, PER 15 MIN: HCPCS

## 2022-06-19 PROCEDURE — 124N000002 HC R&B MH UMMC

## 2022-06-19 PROCEDURE — 250N000013 HC RX MED GY IP 250 OP 250 PS 637: Performed by: STUDENT IN AN ORGANIZED HEALTH CARE EDUCATION/TRAINING PROGRAM

## 2022-06-19 RX ADMIN — FLUPHENAZINE HYDROCHLORIDE 15 MG: 5 SOLUTION, CONCENTRATE ORAL at 20:18

## 2022-06-19 RX ADMIN — OMEPRAZOLE 40 MG: 20 CAPSULE, DELAYED RELEASE ORAL at 12:17

## 2022-06-19 RX ADMIN — MELATONIN TAB 3 MG 3 MG: 3 TAB at 20:18

## 2022-06-19 RX ADMIN — LORAZEPAM 1 MG: 1 TABLET ORAL at 06:47

## 2022-06-19 RX ADMIN — LORAZEPAM 1 MG: 1 TABLET ORAL at 20:18

## 2022-06-19 ASSESSMENT — ACTIVITIES OF DAILY LIVING (ADL)
LAUNDRY: WITH SUPERVISION
DRESS: INDEPENDENT
ORAL_HYGIENE: INDEPENDENT
HYGIENE/GROOMING: INDEPENDENT
ORAL_HYGIENE: INDEPENDENT
DRESS: INDEPENDENT
HYGIENE/GROOMING: INDEPENDENT
LAUNDRY: WITH SUPERVISION

## 2022-06-19 NOTE — PLAN OF CARE
Pt appeared to have slept 7 hours but around 7 am pt came out by the desk and asked for prn Ativan. Pt stated he was agitated and did not sleep well and only slept 4 hours. No prn Ativan was administered and pt went back to his room. No c/o pain reported.    Problem: Sleep Disturbance  Goal: Adequate Sleep/Rest  Outcome: Ongoing, Progressing   Goal Outcome Evaluation:

## 2022-06-19 NOTE — PLAN OF CARE
"Goal Outcome Evaluation:    Plan of Care Reviewed With: patient     Problem: Manic Behavior Episode  Goal: Decreased Manic Symptoms  Outcome: Ongoing, Not Progressing    Pt is pleasant on approach. Presents with grandiose delusions and pressured speech. Stopped by the nursing station and stated to writer, \"I have mastered my emotions. The next step is buying a house and finding a wife. I already have a wife picked out; her mother approves so I just have to get approval from her father so she's practically my fiance already...off the record.\" Another time pt stopped by the nursing station and told a nurse, \"if anyone gives you lip, tell them 'if a women tells you twice, that's an order'.\" Pt paced the halls, attended group, socialized and a played card game with peers during the shift. Med compliant. Received ativan and melatonin per request for sleep. Will continue to monitor and assist as needed.          "

## 2022-06-19 NOTE — PLAN OF CARE
Goal Outcome Evaluation:    Plan of Care Reviewed With: patient     Problem: Manic Behavior Episode  Goal: Decreased Manic Symptoms  Outcome: Ongoing, Not Progressing     Pt is pleasant on approach. Stated he is trying to take it easy today and stay off his feet. Observed laying in bed and sleeping for the majority of the shift. Awake for lunch. Napping after. Will continue to monitor and assist as needed.

## 2022-06-20 PROCEDURE — 124N000002 HC R&B MH UMMC

## 2022-06-20 PROCEDURE — 250N000013 HC RX MED GY IP 250 OP 250 PS 637: Performed by: STUDENT IN AN ORGANIZED HEALTH CARE EDUCATION/TRAINING PROGRAM

## 2022-06-20 PROCEDURE — 99233 SBSQ HOSP IP/OBS HIGH 50: CPT | Mod: GC | Performed by: PSYCHIATRY & NEUROLOGY

## 2022-06-20 PROCEDURE — G0177 OPPS/PHP; TRAIN & EDUC SERV: HCPCS

## 2022-06-20 PROCEDURE — 250N000011 HC RX IP 250 OP 636: Performed by: STUDENT IN AN ORGANIZED HEALTH CARE EDUCATION/TRAINING PROGRAM

## 2022-06-20 RX ORDER — FLUPHENAZINE DECANOATE 25 MG/ML
12.5 INJECTION, SOLUTION INTRAMUSCULAR; SUBCUTANEOUS ONCE
Status: COMPLETED | OUTPATIENT
Start: 2022-06-20 | End: 2022-06-20

## 2022-06-20 RX ORDER — FLUPHENAZINE DECANOATE 25 MG/ML
37.5 INJECTION, SOLUTION INTRAMUSCULAR; SUBCUTANEOUS
Status: DISCONTINUED | OUTPATIENT
Start: 2022-07-04 | End: 2022-06-30

## 2022-06-20 RX ADMIN — OMEPRAZOLE 40 MG: 20 CAPSULE, DELAYED RELEASE ORAL at 13:36

## 2022-06-20 RX ADMIN — FLUPHENAZINE HYDROCHLORIDE 15 MG: 5 SOLUTION, CONCENTRATE ORAL at 20:41

## 2022-06-20 RX ADMIN — FLUPHENAZINE DECANOATE 25 MG: 25 INJECTION, SOLUTION INTRAMUSCULAR; SUBCUTANEOUS at 12:57

## 2022-06-20 RX ADMIN — FLUPHENAZINE DECANOATE 12.5 MG: 25 INJECTION, SOLUTION INTRAMUSCULAR; SUBCUTANEOUS at 14:15

## 2022-06-20 RX ADMIN — LORAZEPAM 1 MG: 1 TABLET ORAL at 05:16

## 2022-06-20 RX ADMIN — LORAZEPAM 1 MG: 1 TABLET ORAL at 20:41

## 2022-06-20 RX ADMIN — MELATONIN TAB 3 MG 3 MG: 3 TAB at 20:41

## 2022-06-20 ASSESSMENT — ACTIVITIES OF DAILY LIVING (ADL)
ORAL_HYGIENE: INDEPENDENT
LAUNDRY: WITH SUPERVISION
HYGIENE/GROOMING: INDEPENDENT
DRESS: INDEPENDENT
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
## 2022-06-20 NOTE — PLAN OF CARE
Assessment/Intervention/Current Symptoms and Care Coordination    Attended team meeting and reviewed chart notes.    Pt is still exhibiting symptoms of erum. He can at times appear stable but then resorts to disorganized thinking with pressured speech.      Discharge Plan or Goal    Discharge home versus IRTs placement      Barriers to Discharge   Needs stabilization  Pt has recommit hearing tomorrow      Referral Status  None made    Legal Status  Pt is committed with guo- pt will need PD upon discharge

## 2022-06-20 NOTE — PROGRESS NOTES
06/19/22 1900   Group Therapy Session   Group Attendance attended group session   Time Session Began 1715   Time Session Ended 1800   Total Time patient participated (minutes) 60   Total # Attendees 7   Group Type expressive therapy   Group Topic Covered emotions/expression   Patient Response/Contribution cooperative with task   Art Therapy directive was to create group collaborative drawings by contributing to each group members drawings through exploration of eight different art mediums.  Goals of directive: social interest, to assess how individuals function within a group dynamic, emotional expression, media exploration  Pt was an engaged participant, focused on task for the full duration of group. Pt contributed art to each drawing and painting and shared at the end of group that he enjoyed being a part of the group process and contributing to each piece.  Pts mood was calm, pleasant participant.
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
## 2022-06-20 NOTE — PLAN OF CARE
"Problem: Behavioral Health Plan of Care  Goal: Adheres to Safety Considerations for Self and Others  Outcome: Ongoing, Progressing     Problem: Behavioral Health Plan of Care  Goal: Optimized Coping Skills in Response to Life Stressors  Outcome: Ongoing, Progressing     Pt's VSS this shift. Pt reported of having mild pain on knee and feet rated at 2/10. Pt stated that it is tolerable pain and declined PRN pain med when offered. Pt was out in a milieu for most of the shift; pacing in a hallway with a headphone on. Pt is pleasant on approach. Pt has a pressured speech and mostly shares thoughts regarding Zoroastrianism stuff. Pt denies all mental symptoms. Pt still has auditory hallucination and stated that he still hears voices of gods. Pt participated to evening group. Pt ate his dinner and snacks on time.   When the writer was on break, staffs reported that around 1930, there were 2 visitors (one male and one female) for the pt despite making clear that only one visitor at a time is allowed. A male visitor has his name listed for appointment but the female visitor wanted to come at a same time to meet pt. The female visitor told the staff that pt might not recognize her but stated that pt knows her late . When other nurses went out to tell about unit policies; that woman bursted her behavior and started using \"f words\" and stated that she has to meet pt to share a news that her  who was pt's friend had committed suicide. That woman was crying with rambling speech. It's not clear whether that friend had committed suicide a month ago. Staff told the visitor that we can first talk with the doctors tomorrow regarding when to let pt about that news. Per staff, female visitor stated that she is not going to come back. However, only the male visitor was allowed to visit the pt. Pt is not aware about the incidents so far. Pt is compliant with meds. Pt received PRN Melatonin and PRN Ativan at 2041.    Goal Outcome " Evaluation:    Plan of Care Reviewed With: patient

## 2022-06-20 NOTE — PLAN OF CARE
Occupational Therapy Group Note      06/20/22 1445   Group Therapy Session   Group Attendance attended group session   Time Session Began 0115   Time Session Ended 0210   Total Time patient participated (minutes) 45   Total # Attendees 4   Group Type psychotherapeutic   Group Topic Covered structured socialization;relapse prevention   Group Session Detail Topic: Bridge to Self Confidence game for building confidence and sense of self-worth, insight development, structured social engagement and discussing ways to cope with stress/sxs.   Patient Response/Contribution cooperative with task;discussed personal experience with topic    Pt joined group and was a positive participant.  Pt presented as positive but intense with underlying grandiosity.  Pt continues to present with a lack of insight into his symptoms, often answering question in a manner that is incongruent with his overall presentation on the unit, such as saying a strength of his is always staying calm and peaceful.  Pt encouraged peers and offered helpful suggestions to others. Expressed gratitude for group and said he found it helpful.

## 2022-06-20 NOTE — PLAN OF CARE
Pt appeared to sleep 6.5 hours, per pt report he slept 3 hours. Pt c/o anxiety and was given PRN ativan. Pt said his ribs hurt from crying so hard during his conversation with the - offered PRN medication and ice pack but pt declined all interventions. No medical or behavioral events this shift.      Problem: Sleep Disturbance  Goal: Adequate Sleep/Rest  Outcome: Ongoing, Not Progressing

## 2022-06-21 PROCEDURE — 99233 SBSQ HOSP IP/OBS HIGH 50: CPT | Mod: GC | Performed by: PSYCHIATRY & NEUROLOGY

## 2022-06-21 PROCEDURE — 250N000013 HC RX MED GY IP 250 OP 250 PS 637: Performed by: STUDENT IN AN ORGANIZED HEALTH CARE EDUCATION/TRAINING PROGRAM

## 2022-06-21 PROCEDURE — 124N000002 HC R&B MH UMMC

## 2022-06-21 PROCEDURE — G0177 OPPS/PHP; TRAIN & EDUC SERV: HCPCS

## 2022-06-21 RX ADMIN — LORAZEPAM 1 MG: 1 TABLET ORAL at 10:18

## 2022-06-21 RX ADMIN — FLUPHENAZINE HYDROCHLORIDE 15 MG: 5 SOLUTION, CONCENTRATE ORAL at 20:21

## 2022-06-21 RX ADMIN — LORAZEPAM 1 MG: 1 TABLET ORAL at 20:23

## 2022-06-21 RX ADMIN — OMEPRAZOLE 40 MG: 20 CAPSULE, DELAYED RELEASE ORAL at 08:37

## 2022-06-21 RX ADMIN — MELATONIN TAB 3 MG 3 MG: 3 TAB at 20:23

## 2022-06-21 ASSESSMENT — ACTIVITIES OF DAILY LIVING (ADL)
ORAL_HYGIENE: INDEPENDENT
DRESS: INDEPENDENT
HYGIENE/GROOMING: INDEPENDENT
LAUNDRY: WITH SUPERVISION
DRESS: INDEPENDENT
ORAL_HYGIENE: INDEPENDENT
HYGIENE/GROOMING: INDEPENDENT
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
## 2022-06-21 NOTE — PLAN OF CARE
Occupational Therapy Group Note      06/21/22 1215   Group Therapy Session   Group Attendance attended group session   Time Session Began 1110   Time Session Ended 1155   Total Time patient participated (minutes) 45   Total # Attendees 6   Group Type expressive therapy   Group Topic Covered coping skills/lifestyle management   Group Session Detail Topic: OT Open Clinic for creative expression, promoting autonomy, building sense of self-worth, coping with stress/symptoms and opportunity to exercise cognitive skills (i.e. initiation, planning, organization, sequencing, sustained attention, follow-through, termination of task and overall safety awareness).   Patient Response/Contribution cooperative with task    Pt joined group and requested to do a guided meditation.  Pt set up a space with a yoga mat and writer assisted pt with setting up meditation tracks on the I-pad.  Pt laid supine for 45 minutes listening to meditation tracks.  Pt presented with overall mood intensity.  Had a positive response to the tracks and expressed appreciation.
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
## 2022-06-21 NOTE — PLAN OF CARE
Pt appeared to sleep 7 hours. Not out of room overnight. No PRNs given or requested. No medical or behavioral events this shift.      Problem: Sleep Disturbance  Goal: Adequate Sleep/Rest  Outcome: Ongoing, Progressing

## 2022-06-21 NOTE — PLAN OF CARE
"  Problem: Cognitive Impairment (Psychotic Signs/Symptoms)  Goal: Optimal Cognitive Function (Psychotic Signs/Symptoms)  Outcome: Ongoing, Progressing  Intervention: Support and Promote Cognitive Ability  Recent Flowsheet Documentation  Taken 6/21/2022 1000 by Silvina Higgins RN  Trust Relationship/Rapport:    care explained    emotional support provided    empathic listening provided    thoughts/feelings acknowledged    questions encouraged    questions answered    reassurance provided    choices provided   Goal Outcome Evaluation:    Plan of Care Reviewed With: patient      Behavioral  Pt was visible in the milieu and observed pacing the hallways. Pt denies all mental health symptoms and denies anxiety, depression, SI thoughts, or AVH. Pt mood appears depressed and anxious and presents with flat, blunted, and restricted affect. Pt used \"fuck\" work several times during the court hearing while the computer was on [employer]. He talked about voices from a \"she\" power because \"males\" are corrupt and described the voice as accompanying him in the background. Pt also reported \"a lot of pain\" to his ankles, joints, and knees from pacing the hallways, \"the only way to cope\" and declined to have any pain medications, \"that is all pain,\" and stated he would take the medications if the pain gets him screaming. Pt's speech was pressured and rapid during check-in. He requested the writer to ask the psychiatrist to discharge him, \"They don't listen to me, they useless, I need my medications tapered down and discontinued, this is poison, I need to go home and sleep.\" Pt appears to have poor insight into his mental health. The team will continue to support pt.   Medical Alerts  None  Plan  Continue to monitor     Pt requested Ensure supplement discontinued from his tray. Pt's request completed at this time.          "

## 2022-06-21 NOTE — PLAN OF CARE
Assessment/Intervention/Current Symptoms and Care Coordination    Attended team meeting and reviewed chart notes.    Pt attended his recommittment exam and hearing today. He continues to display manic symptoms in the manner of delusions. According to DORA Higgins pt was making delusional statements during his exam. Writer has not heard anything from the [facility] as to any decisions.        Discharge Plan or Goal  To home with family    Barriers to Discharge   Needs stabilization      Referral Status  None made      Legal Status  Pt is committed with Jo  Pt had his recommitment exam and hearing today

## 2022-06-21 NOTE — PLAN OF CARE
06/21/22 1046   Individualization/Patient Specific Goals   Patient Personal Strengths family/social support;expressive of needs   Patient Vulnerabilities lacks insight into illness;history of unsuccessful treatment;substance abuse/addiction   Anxieties, Fears or Concerns Pt is anxious for discharge   Patient-Specific Goals (Include Timeframe) take medication daily, attende groups daily   Interprofessional Rounds   Participants psychiatrist;CTC;nursing   Team Discussion   Participants Dr. Swan, resident Marito Arias, two medical students Gautam and Laron, [facility] DORA Todd   Progress minimal progress, pt still delusional and has pressured Speech   Anticipated length of stay until pt is stable enough to discharge   Continued Stay Criteria/Rationale needs stabilization   Medical/Physical none mentioned in team   Precautions 15 minute checks   Plan manage medications, offer group therapy   Rationale for change in precautions or plan no change   Anticipated Discharge Disposition home with family   .a

## 2022-06-22 PROCEDURE — 124N000002 HC R&B MH UMMC

## 2022-06-22 PROCEDURE — 250N000013 HC RX MED GY IP 250 OP 250 PS 637: Performed by: STUDENT IN AN ORGANIZED HEALTH CARE EDUCATION/TRAINING PROGRAM

## 2022-06-22 PROCEDURE — G0177 OPPS/PHP; TRAIN & EDUC SERV: HCPCS

## 2022-06-22 PROCEDURE — H2032 ACTIVITY THERAPY, PER 15 MIN: HCPCS

## 2022-06-22 PROCEDURE — 99232 SBSQ HOSP IP/OBS MODERATE 35: CPT | Mod: GC | Performed by: PSYCHIATRY & NEUROLOGY

## 2022-06-22 RX ADMIN — FLUPHENAZINE HYDROCHLORIDE 15 MG: 5 SOLUTION, CONCENTRATE ORAL at 19:52

## 2022-06-22 RX ADMIN — LORAZEPAM 1 MG: 1 TABLET ORAL at 19:52

## 2022-06-22 RX ADMIN — MELATONIN TAB 3 MG 3 MG: 3 TAB at 19:52

## 2022-06-22 RX ADMIN — OMEPRAZOLE 40 MG: 20 CAPSULE, DELAYED RELEASE ORAL at 12:32

## 2022-06-22 ASSESSMENT — ACTIVITIES OF DAILY LIVING (ADL)
HYGIENE/GROOMING: INDEPENDENT
LAUNDRY: WITH SUPERVISION
ORAL_HYGIENE: INDEPENDENT
ORAL_HYGIENE: INDEPENDENT
DRESS: INDEPENDENT
HYGIENE/GROOMING: INDEPENDENT
DRESS: INDEPENDENT

## 2022-06-22 NOTE — PLAN OF CARE
Assessment/Intervention/Current Symptoms and Care Coordination    Attended team meeting and reviewed chart notes.    Writer gave pt his court order renewing the commitment and Jo order. Pt was not upset and is now focussed on discharge planning.      Discharge Plan or Goal  Return to parents home      Barriers to Discharge   Needs stabilization      Referral Status  None made      Legal Status  Pt is committed with Jo order. He will need a PD upon discharge.

## 2022-06-22 NOTE — PROGRESS NOTES
"While patient was pacing the halls he continously approached the writer while the writer was on a SIO with another patient. Patients conversation topics with writer varied each time he approached. He said things ranging from \"As someone who knows how to avoid hypnotism I also know how to hypnotize myself. 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 Patient finished conversation once writer left their one to one, and he continued to pace the black. Writer will continue to monitor.  "

## 2022-06-22 NOTE — PLAN OF CARE
[facility] Occupational Therapy Group Intervention Note     06/22/22 1403   Group Therapy Session   Group Attendance attended group session   Time Session Began 1310   Time Session Ended 1400   Total Time patient participated (minutes) 45   Total # Attendees 4   Group Type life skill   Group Topic Covered coping skills/lifestyle management;relaxation techniques   Group Session Detail clinic - coping skill exploration, creative expression within personally meaningful activities, and observation of social, cognitive, and kinesthetic performance skills   Patient Response/Contribution cooperative with task   Patient Response Detail per polite request, engaged in a 35min guided meditation practice. Appeared with a calm even demeanor at the end.      Kassy Harding, OT on 6/22/2022 at 2:04 PM

## 2022-06-22 NOTE — PLAN OF CARE
"  Problem: Manic Behavior Episode  Goal: Decreased Manic Symptoms  Outcome: Ongoing, Progressing  Intervention: Promote Mood Equilibrium  Recent Flowsheet Documentation  Taken 6/21/2022 1637 by Winifred Curry RN  Behavior Management: impulse control promoted  Sensory Stimulation Regulation: music on  Supportive Measures: active listening utilized     Problem: Pain Acute  Goal: Acceptable Pain Control and Functional Ability  Outcome: Ongoing, Progressing  Intervention: Prevent or Manage Pain  Recent Flowsheet Documentation  Taken 6/21/2022 1637 by Winifred Curry RN  Sensory Stimulation Regulation: music on  Medication Review/Management: medications reviewed  Intervention: Optimize Psychosocial Wellbeing  Recent Flowsheet Documentation  Taken 6/21/2022 1637 by Winifred Curry RN  Supportive Measures: active listening utilized     Problem: Suicidal Behavior  Goal: Suicidal Behavior is Absent or Managed  Outcome: Ongoing, Progressing  Flowsheets (Taken 6/21/2022 2106)  Mutually Determined Action Steps (Suicidal Behavior Absent/Managed): verbalizes safety check rationale  Intervention: Provide Immediate and Ongoing Protective Physical Environment  Recent Flowsheet Documentation  Taken 6/21/2022 1637 by Winifred Curry RN  Safe Transition Promotion: protective factors promoted   Goal Outcome Evaluation:    Plan of Care Reviewed With: patient     Pt out in the black pacing and with head phone listening to music. Pt stated his court hearing went well, he is hopeful and looking forward to future discharge date, possibly early next week. Pt's affect was flat/blunted. His mood was pleasant and cooperative all evening. He was compliant and acknowledged need for medication(Prolixin). Pt stated \"the Doctors think I need my medication to get well.\" Pt was given prn ativan and melatonin to manage any possible lingering anxiety/restlessness and for sleep as  requested. Pt had good food and fluid intake. Pt denies all " symptoms of psychosis. No behavioral or any physical concerns.
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
## 2022-06-23 PROCEDURE — 250N000013 HC RX MED GY IP 250 OP 250 PS 637: Performed by: STUDENT IN AN ORGANIZED HEALTH CARE EDUCATION/TRAINING PROGRAM

## 2022-06-23 PROCEDURE — 99232 SBSQ HOSP IP/OBS MODERATE 35: CPT | Mod: GC | Performed by: PSYCHIATRY & NEUROLOGY

## 2022-06-23 PROCEDURE — 124N000002 HC R&B MH UMMC

## 2022-06-23 RX ORDER — BENZTROPINE MESYLATE 1 MG/1
1 TABLET ORAL 2 TIMES DAILY PRN
Status: DISCONTINUED | OUTPATIENT
Start: 2022-06-23 | End: 2022-07-01 | Stop reason: HOSPADM

## 2022-06-23 RX ADMIN — LORAZEPAM 1 MG: 1 TABLET ORAL at 15:20

## 2022-06-23 RX ADMIN — MELATONIN TAB 3 MG 3 MG: 3 TAB at 20:06

## 2022-06-23 RX ADMIN — FLUPHENAZINE HYDROCHLORIDE 15 MG: 5 SOLUTION, CONCENTRATE ORAL at 20:06

## 2022-06-23 RX ADMIN — LORAZEPAM 1 MG: 1 TABLET ORAL at 09:21

## 2022-06-23 RX ADMIN — LORAZEPAM 1 MG: 1 TABLET ORAL at 20:06

## 2022-06-23 RX ADMIN — LORAZEPAM 1 MG: 1 TABLET ORAL at 02:54

## 2022-06-23 ASSESSMENT — ACTIVITIES OF DAILY LIVING (ADL)
HYGIENE/GROOMING: INDEPENDENT
HYGIENE/GROOMING: INDEPENDENT
ORAL_HYGIENE: INDEPENDENT
LAUNDRY: WITH SUPERVISION
ORAL_HYGIENE: INDEPENDENT
DRESS: SCRUBS (BEHAVIORAL HEALTH)
LAUNDRY: UNABLE TO COMPLETE
DRESS: SCRUBS (BEHAVIORAL HEALTH)

## 2022-06-23 NOTE — PLAN OF CARE
Goal Outcome Evaluation:    Plan of Care Reviewed With: patient      Patient visible in milieu, requested prn ativan this morning for anxiety.  Refused scheduled morning medications when offered.  Good appetite, currently taking nap in room.  Staff unable to do 1:! Check at this time as patient is sleeping.  Will continue to monitor closely.

## 2022-06-23 NOTE — PLAN OF CARE
Assessment/Intervention/Current Symptoms and Care Coordination    Attended team meeting and reviewed chart notes.    Pt has been taking medication as prescribed and attending some groups. He still is exhibiting manic symptoms and disorganization of thoughts.        Discharge Plan or Goal    Return home with family    Barriers to Discharge   Needs stabilization      Referral Status  None made      Legal Status  Pt is committed with Jo order. He will need a PD upon discharge
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
## 2022-06-23 NOTE — PLAN OF CARE
"  Problem: Suicidal Behavior  Goal: Suicidal Behavior is Absent or Managed  Outcome: Ongoing, Progressing  Flowsheets (Taken 6/22/2022 2112)  Mutually Determined Action Steps (Suicidal Behavior Absent/Managed): verbalizes safety check rationale     Problem: Cognitive Impairment (Psychotic Signs/Symptoms)  Goal: Optimal Cognitive Function (Psychotic Signs/Symptoms)  Outcome: Ongoing, Progressing  Flowsheets (Taken 6/22/2022 2112)  Mutually Determined Action Steps (Optimal Cognitive Function): remains focused during activity   Goal Outcome Evaluation:      Pt was met in bed at the start of the shift. Pt asked writer to leave his room,  He said \"I don't want to talk now. Go away.\" Pt came out shortly after gave two thumbs up to writer and said he was in a conversation but did not state with whom.  During the first half of the shift, pt appeared as glum, down cast and minimally communicates with staff or peers. Pt was over heard on a phone coversation saying \"If I dies just know that I love you.\"  When approached about such statement pt said he feels like he is dying, he aches all over, because he has not had \"deep REM sleep.\" However, he declined prn pain medication offered and walked away. Pt has been visible on the unit pacing most of the evening with head phone on. He attended groups and after, he was more social, engaged, and appropriate with interaction. His affect was brightened. Pt apologies to writer for his behavior earlier and asked staff for a hug. Pt was redirected and that writer would prefer a hand shake which he agreed to. Pt maintained pleasant and cooperative behavior rest of the shift. Prn ativan and melatonin given as requested to promote sleep. Denies all psych symptoms. No suicidal behavior reported or observed.                        "

## 2022-06-23 NOTE — PLAN OF CARE
Problem: Manic Behavior Episode  Goal: Decreased Manic Symptoms  Outcome: Ongoing, Progressing  Intervention: Promote Mood Equilibrium  Recent Flowsheet Documentation  Taken 6/23/2022 1637 by Winifred Curry RN  Sensory Stimulation Regulation: quiet environment promoted  Supportive Measures:    active listening utilized    self-reflection promoted     Problem: Suicidal Behavior  Goal: Suicidal Behavior is Absent or Managed  Outcome: Ongoing, Progressing  Flowsheets (Taken 6/23/2022 1651)  Mutually Determined Action Steps (Suicidal Behavior Absent/Managed): verbalizes safety check rationale  Intervention: Provide Immediate and Ongoing Protective Physical Environment  Recent Flowsheet Documentation  Taken 6/23/2022 1637 by Winifred Curry RN  Safe Transition Promotion: protective factors promoted   Goal Outcome Evaluation:    Plan of Care Reviewed With: patient      Pt was in his room napping at the start of the shift and was up about an hour later. He has being visible on the unit, pacing in the black with head phone. Pt was calm, pleasant and cooperative. Pt was social with selective peer, staffs and appropriate with interaction. Adequate food and fluid in take. Pt denies anxiety and depression but did request and given prn ativan and melatonin at hs to help with sleep. Pt denies all other mental and physical health concerns. No outburst behavior. Pt adhere to medication.

## 2022-06-23 NOTE — PROGRESS NOTES
06/22/22 2100   Group Therapy Session   Time Session Began 1715   Time Session Ended 1810   Total Time patient participated (minutes) 53   Total # Attendees 6-7   Group Type expressive therapy   Group Topic Covered balanced lifestyle;relaxation techniques   Group Session Detail Relaxation   Patient Response/Contribution cooperative with task   Patient Response Detail Cooperatively engaged in Evening Music Relaxation group to decrease anxiety and promote sleep.  Calm affect, appropriately engaged in session, responding well to the music.  Did exhibit some pressured speech, but overall calm and agreeable.

## 2022-06-23 NOTE — PLAN OF CARE
Problem: Sleep Disturbance  Goal: Adequate Sleep/Rest  Outcome: Ongoing, Progressing  Intervention: Promote Sleep/Rest  Flowsheets (Taken 6/23/2022 5963)  Sleep/Rest Enhancement: regular sleep/rest pattern promoted     Patient was sleeping at the start of shift. He was awake at past 0200. Requested for PRN Ativan for sleep and was given to him. He was back to sleep after receiving the Ativan. He appeared to sleep 6.25 hours. No other concerns noted.

## 2022-06-24 PROCEDURE — 99232 SBSQ HOSP IP/OBS MODERATE 35: CPT | Mod: GC | Performed by: PSYCHIATRY & NEUROLOGY

## 2022-06-24 PROCEDURE — 250N000013 HC RX MED GY IP 250 OP 250 PS 637: Performed by: STUDENT IN AN ORGANIZED HEALTH CARE EDUCATION/TRAINING PROGRAM

## 2022-06-24 PROCEDURE — 124N000002 HC R&B MH UMMC

## 2022-06-24 RX ADMIN — LORAZEPAM 1 MG: 1 TABLET ORAL at 19:51

## 2022-06-24 RX ADMIN — MELATONIN TAB 3 MG 3 MG: 3 TAB at 19:51

## 2022-06-24 RX ADMIN — OMEPRAZOLE 40 MG: 20 CAPSULE, DELAYED RELEASE ORAL at 12:46

## 2022-06-24 RX ADMIN — LORAZEPAM 1 MG: 1 TABLET ORAL at 06:23

## 2022-06-24 RX ADMIN — LORAZEPAM 1 MG: 1 TABLET ORAL at 12:56

## 2022-06-24 RX ADMIN — FLUPHENAZINE HYDROCHLORIDE 15 MG: 5 SOLUTION, CONCENTRATE ORAL at 19:48

## 2022-06-24 ASSESSMENT — ACTIVITIES OF DAILY LIVING (ADL)
ORAL_HYGIENE: INDEPENDENT
DRESS: INDEPENDENT
DRESS: INDEPENDENT
HYGIENE/GROOMING: INDEPENDENT
HYGIENE/GROOMING: INDEPENDENT
ORAL_HYGIENE: INDEPENDENT

## 2022-06-24 NOTE — PLAN OF CARE
Assessment/Intervention/Current Symptoms and Care Coordination    Attended team meeting and reviewed chart notes.    Writer spoke with pt's friend Watson regarding telling pt that his friend had committed suicide. Dr. Swan was consulted and gave the ok for Watson to tell pt. Watson may come this evening or over the weekend to tell pt.    Writer invited pt to attend relaxation groups but pt declined stating he has his own way of relaxing.      Discharge Plan or Goal  Return home with family      Barriers to Discharge   Needs stabilization      Referral Status    None made      Legal Status  Committed with Jo order- pt will need PD upon discharge.
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
## 2022-06-24 NOTE — PROGRESS NOTES
Behavioral Health  Note   Behavioral Health  Spirituality Group Note     Unit [facility]    Name: Sam Minor    YOB: 1985   MRN: [id number]    Age: 37 year old     Patient attended -led group, which included discussion of spirituality, coping with illness and building resilience.   Patient attended group for 1.0 hrs.   patient demonstrated an appreciation of topic's application for their personal circumstances.     Neelima [facility]  Staff    Page [phone]

## 2022-06-24 NOTE — PLAN OF CARE
Problem: Behavioral Health Plan of Care  Goal: Plan of Care Review  Outcome: Ongoing, Progressing     Problem: Manic Behavior Episode  Goal: Decreased Manic Symptoms  Outcome: Ongoing, Progressing     Problem: Sleep Disturbance  Goal: Adequate Sleep/Rest  Outcome: Ongoing, Progressing     Problem: Suicidal Behavior  Goal: Suicidal Behavior is Absent or Managed  Outcome: Ongoing, Progressing   Goal Outcome Evaluation:    Pt  was isolative in his room most of the shift. Pt was awake to receive a phone call and attended one group this shift,  otherwise he  slept until about 1230 and received his morning dose of Prilosec at 1246.   At about 1250, Pt verbalized agitation and requested for Ativan which was administered at 1256. Pt stated that his agitation is related to him still here in the hospital. Safety checks completed throughout the shift per protocol. Respirations are even and unlabored. No discomfort or pain verbalized.  No precautionary behavior noted or reported. No delusions noted or reported  this shift and pt denies  SI/HI or hallucination. Pt contracted for safety and was medication compliant. Will continue to monitor and assist as needed.

## 2022-06-25 PROCEDURE — 250N000013 HC RX MED GY IP 250 OP 250 PS 637: Performed by: STUDENT IN AN ORGANIZED HEALTH CARE EDUCATION/TRAINING PROGRAM

## 2022-06-25 PROCEDURE — 250N000013 HC RX MED GY IP 250 OP 250 PS 637: Performed by: PSYCHIATRY & NEUROLOGY

## 2022-06-25 PROCEDURE — 124N000002 HC R&B MH UMMC

## 2022-06-25 PROCEDURE — H2032 ACTIVITY THERAPY, PER 15 MIN: HCPCS

## 2022-06-25 RX ADMIN — LORAZEPAM 1 MG: 1 TABLET ORAL at 10:00

## 2022-06-25 RX ADMIN — LORAZEPAM 1 MG: 1 TABLET ORAL at 13:55

## 2022-06-25 RX ADMIN — MELATONIN TAB 3 MG 3 MG: 3 TAB at 19:46

## 2022-06-25 RX ADMIN — LORAZEPAM 1 MG: 1 TABLET ORAL at 04:24

## 2022-06-25 RX ADMIN — FLUPHENAZINE HYDROCHLORIDE 15 MG: 5 SOLUTION, CONCENTRATE ORAL at 19:46

## 2022-06-25 RX ADMIN — OMEPRAZOLE 40 MG: 20 CAPSULE, DELAYED RELEASE ORAL at 13:03

## 2022-06-25 RX ADMIN — LORAZEPAM 1 MG: 1 TABLET ORAL at 19:46

## 2022-06-25 ASSESSMENT — ACTIVITIES OF DAILY LIVING (ADL)
DRESS: INDEPENDENT
LAUNDRY: WITH SUPERVISION
HYGIENE/GROOMING: INDEPENDENT
ORAL_HYGIENE: INDEPENDENT
ORAL_HYGIENE: INDEPENDENT
DRESS: INDEPENDENT
LAUNDRY: WITH SUPERVISION
HYGIENE/GROOMING: INDEPENDENT

## 2022-06-25 NOTE — PLAN OF CARE
Problem: Suicidal Behavior  Goal: Suicidal Behavior is Absent or Managed  Outcome: Ongoing, Progressing  Intervention: Provide Immediate and Ongoing Protective Physical Environment  Recent Flowsheet Documentation  Taken 6/25/2022 1119 by Petra Toribio RN  Safe Transition Promotion: protective factors promoted     Pt isolated to his room for the majority of the day. He is somewhat irritable when interacting with this writer. Pt requested and received Ativan prn for agitation at 1000 an 1400. He reports that the Ativan is to help keep him off his feet so he can rest. Pt states that he has not been sleeping well but he does not want any additional medications for this. States that he prefers to utilize marijuana for sleep. Pt denies SI/SIB, as well as AH/VH. He states that his diagnosis is misunderstood. When asked by this writer how he would explain his diagnosis, pt declined to explain to this writer. Did not eat breakfast but ate 100% of his lunch meal. No additional concerns at this time. Will continue to assess and offer support.

## 2022-06-25 NOTE — PLAN OF CARE
Problem: Sleep Disturbance  Goal: Adequate Sleep/Rest  Outcome: Ongoing, Progressing   Goal Outcome Evaluation:    Pt was noted sleeping soundly in bed till about 0420 when he was waken by the noise coming from another pt's room across from his own room, who refused to take redirection. He requested and received PRN Ativan 1 Mg at 0430. Pt went back to his room right after receiving his PRN. He appeared to have slept for 6.75 hours.

## 2022-06-25 NOTE — PLAN OF CARE
"  Problem: Thought Process Alteration  Goal: Optimal Thought Clarity  Outcome: Ongoing, Not Progressing   Goal Outcome Evaluation:    Nursing Assessment    Recent Vitals: B/P:120/80  T:98  P:90     General Shift Summary  Pt was isolative to room most of shift. Pt came out for evening meds and reported being agitated. When asked if they were agitated regarding hospitalization and their medication pt told writer, \"just get me my meds, it's sleep time.\" Writer also pointed out to pt they have been in bed most of the day today which is not typical for them. Writer asked pt if they were feeling tired, depressed, or bored. Pt was irritable when asked this and responds, \"I'm healing. I am meditating, and practicing ASMR.\"     Patient is medication compliant and reported no side effects. Pt is upset about still being given Prolixin and asks writer to tell doctors to, \"wean me off.\" Pt states, \"they told me they are giving it to me to keep me stable, I don't need oral medication to do that, I already get a shot for that.\" Writer acknowledged pt, and told them they would document their concern.     Hygiene and appetite appropriate.     Chidi Milton RN      "

## 2022-06-26 PROCEDURE — 90853 GROUP PSYCHOTHERAPY: CPT

## 2022-06-26 PROCEDURE — 250N000013 HC RX MED GY IP 250 OP 250 PS 637: Performed by: STUDENT IN AN ORGANIZED HEALTH CARE EDUCATION/TRAINING PROGRAM

## 2022-06-26 PROCEDURE — 124N000002 HC R&B MH UMMC

## 2022-06-26 PROCEDURE — 250N000013 HC RX MED GY IP 250 OP 250 PS 637: Performed by: PSYCHIATRY & NEUROLOGY

## 2022-06-26 RX ADMIN — LORAZEPAM 1 MG: 1 TABLET ORAL at 19:19

## 2022-06-26 RX ADMIN — MELATONIN TAB 3 MG 3 MG: 3 TAB at 19:19

## 2022-06-26 RX ADMIN — LORAZEPAM 1 MG: 1 TABLET ORAL at 12:19

## 2022-06-26 RX ADMIN — ACETAMINOPHEN 650 MG: 325 TABLET ORAL at 19:19

## 2022-06-26 RX ADMIN — LORAZEPAM 1 MG: 1 TABLET ORAL at 08:21

## 2022-06-26 RX ADMIN — FLUPHENAZINE HYDROCHLORIDE 15 MG: 5 SOLUTION, CONCENTRATE ORAL at 19:19

## 2022-06-26 RX ADMIN — ACETAMINOPHEN 650 MG: 325 TABLET ORAL at 12:19

## 2022-06-26 RX ADMIN — OMEPRAZOLE 40 MG: 20 CAPSULE, DELAYED RELEASE ORAL at 12:18

## 2022-06-26 ASSESSMENT — ACTIVITIES OF DAILY LIVING (ADL)
DRESS: INDEPENDENT
ORAL_HYGIENE: INDEPENDENT
LAUNDRY: WITH SUPERVISION
HYGIENE/GROOMING: INDEPENDENT

## 2022-06-26 NOTE — PLAN OF CARE
Problem: Sleep Disturbance  Goal: Adequate Sleep/Rest  6/26/2022 0250 by Tiara Rodarte RN  Outcome: Ongoing, Progressing   Goal Outcome Evaluation:    Pt appeared asleep most of the night, even and unlabored respirations noted. Pt slept approximately 6.5 hours. No PRN given or requested.

## 2022-06-26 NOTE — PLAN OF CARE
"  Problem: Behavioral Health Plan of Care  Goal: Optimized Coping Skills in Response to Life Stressors  Outcome: Ongoing  Intervention: Promote Effective Coping Strategies  Recent Flowsheet Documentation  Taken 6/26/2022 1134 by Petra Toribio RN  Supportive Measures:   active listening utilized   positive reinforcement provided   problem-solving facilitated   self-care encouraged   self-reflection promoted   self-responsibility promoted   verbalization of feelings encouraged   Goal Outcome Evaluation:    Pt isolated to his room for much of the day today. When assessing pain, pt states, \"It's so bad I can't even rate it. I'm about to scream.\"  Pt reports pain in legs, hips and feet. Initially offered medication or ice/heat pack, pt declined both. However, requested Tylenol prn at    .  Pt received Ativan prn x2 for agitation.     Pt showered and ate both breakfast and lunch meals. Denies SI/SIB, as well as AH/VH. Pt displayed an irritable and agitated mood, stating that he wants to discharge \"before I break something\", referring to intentionally breaking an object on the unit. No additional concerns at this time. Will continue to assess and offer support.     "

## 2022-06-26 NOTE — PLAN OF CARE
"  Problem: Thought Process Alteration  Goal: Optimal Thought Clarity  Outcome: Ongoing, Not Progressing   Goal Outcome Evaluation:    Plan of Care Reviewed With: patient     Nursing Assessment    Recent Vitals: B/P:112/80  T:97.5  P:93     General Shift Summary  Pt out of room more this shift. Pt was irritable with their situation but pleasant and social with staff. Pt reports that they laid in bed for three days to \"heal\" and that, \"practicing ASMR increased my heal rate. My feet and legs are better now. I healed internally too. Now my low back just hurts from laying in bed for so long.\"  Pt complains that they feel they have not been included in their treatment planning and complains of food here stating that they have been losing muscle and gaining fat. Pt did not have any behavioral problems this shift.     Pt reports no bowel movement for past two days. Declined intervention at this time but states they will drink prune juice tomorrow and take a laxative if they still have not gone.     Patient is medication compliant and reported no side effects. Pt is however upset they still have to take oral prolixin and report it is going to make them relapse on alcohol because the solution is alcohol based. Pt also believes that doctors are lying to them about their plans with their medication. Pt requested and was given PRN melatonin and ativan at HS for sleep aid.     Hygiene is fair and appetite is appropriate. PT complaining they are not getting enough protein and does not like the food here. Declined offered interventions.     Chidi Milton RN    "

## 2022-06-27 PROCEDURE — 250N000013 HC RX MED GY IP 250 OP 250 PS 637: Performed by: STUDENT IN AN ORGANIZED HEALTH CARE EDUCATION/TRAINING PROGRAM

## 2022-06-27 PROCEDURE — 124N000002 HC R&B MH UMMC

## 2022-06-27 PROCEDURE — 99233 SBSQ HOSP IP/OBS HIGH 50: CPT | Mod: GC | Performed by: PSYCHIATRY & NEUROLOGY

## 2022-06-27 PROCEDURE — 250N000013 HC RX MED GY IP 250 OP 250 PS 637: Performed by: PSYCHIATRY & NEUROLOGY

## 2022-06-27 PROCEDURE — G0177 OPPS/PHP; TRAIN & EDUC SERV: HCPCS

## 2022-06-27 RX ORDER — LORAZEPAM 0.5 MG/1
0.5 TABLET ORAL EVERY 4 HOURS PRN
Status: DISCONTINUED | OUTPATIENT
Start: 2022-06-27 | End: 2022-06-29

## 2022-06-27 RX ADMIN — OMEPRAZOLE 40 MG: 20 CAPSULE, DELAYED RELEASE ORAL at 09:48

## 2022-06-27 RX ADMIN — LORAZEPAM 1 MG: 1 TABLET ORAL at 03:33

## 2022-06-27 RX ADMIN — ACETAMINOPHEN 650 MG: 325 TABLET ORAL at 19:01

## 2022-06-27 RX ADMIN — FLUPHENAZINE HYDROCHLORIDE 15 MG: 5 SOLUTION, CONCENTRATE ORAL at 19:00

## 2022-06-27 RX ADMIN — LORAZEPAM 1 MG: 1 TABLET ORAL at 09:50

## 2022-06-27 RX ADMIN — LORAZEPAM 1 MG: 1 TABLET ORAL at 14:11

## 2022-06-27 RX ADMIN — MELATONIN TAB 3 MG 3 MG: 3 TAB at 19:01

## 2022-06-27 RX ADMIN — LORAZEPAM 0.5 MG: 0.5 TABLET ORAL at 19:01

## 2022-06-27 ASSESSMENT — ACTIVITIES OF DAILY LIVING (ADL)
HYGIENE/GROOMING: INDEPENDENT
DRESS: INDEPENDENT
ORAL_HYGIENE: INDEPENDENT
ORAL_HYGIENE: INDEPENDENT
LAUNDRY: WITH SUPERVISION
DRESS: INDEPENDENT
HYGIENE/GROOMING: INDEPENDENT

## 2022-06-27 NOTE — PLAN OF CARE
"  Problem: Cognitive Impairment (Psychotic Signs/Symptoms)  Goal: Optimal Cognitive Function (Psychotic Signs/Symptoms)  Outcome: Ongoing, Progressing  Note: \"Tell that fucking loser doctor to stop giving me that oral prolixin. I dont need it! I hate having a higher IQ than my doctor!  Irritable much of the time. He is cooperative with staff and unit protocol.spends most of shift in his room. Minimal interaction with peers. Requested and was given Ativan 1mg, Melatonin, and Hydroxyzine 50 mg prn at 1920. Denies SI and SIB. Joanna Fowler RN    Goal Outcome Evaluation:                      "

## 2022-06-27 NOTE — PLAN OF CARE
"Goal Outcome Evaluation:    Problem: Thought Process Alteration  Goal: Optimal Thought Clarity  Outcome: Ongoing, Not Progressing     Pt was irritable on approach this am. Upset that his breakfast did not get saved for him when he woke up. Received PRN ativan per request and went back to sleep until 1200. Pt requested to speak with writer, \"being in here is like fucking torture. What do I have to do to get myself out of here? Do I have to shave? What do the doctors want to see from me?\" Writer informed him his providers believe pt is still showing symptoms of erum. Pt responded, \"that just fucking pissed me off. I slept for four days straight. I am a high energy person. Dr. Swan asked me if I still have a sugar rush. Is he fucking stupid?\" Pt pinched the fat on his stomach and stated, \"It's going to take me two years to burn all of this off.\"     Pt was informed of planned Friday discharge later this afternoon and is happy and excited, observed dancing in the halls. Will continue to monitor and assist as needed.      "

## 2022-06-27 NOTE — PLAN OF CARE
"Occupational Therapy Group Note      06/27/22 1423   Group Therapy Session   Group Attendance attended group session   Time Session Began 0120   Time Session Ended 0210   Total Time patient participated (minutes) 50   Total # Attendees 2   Group Type psychotherapeutic  (wellness)   Group Topic Covered coping skills/lifestyle management;relaxation techniques   Group Session Detail Topic: Qigong sequence, body scanning and wellness discussion for parsympathetic nervous system activation, sensory modulation, building mind-body awareness, building insight into total body wellness and coping with stress/sxs.   Patient Response/Contribution cooperative with task;discussed personal experience with topic    Pt joined group and requested \"a meditation\"; presenting with high anxiety, pressured speech and apparent hypervigilance.  Pt talked about his doctors continuing to tell him he is manic but he says he is \"acting\".  Writer encouraged pt to explore a new method of mind-body connection and pt agreed to trying Qigong.  Pt engaged in Qigong sequence and body scanning, commenting throughout that it was helping him but also making comments such as \"45 days of being here and I have muscle atrophy.\"  Pt presented as more calm and regulated by the end of the group.  Pt expressed gratitude for group.  Writer offered pt a few interactive worksheets to develop insight into how anxiety feels in his body but pt declined them.        "
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
## 2022-06-27 NOTE — PLAN OF CARE
Problem: Sleep Disturbance  Goal: Adequate Sleep/Rest  Outcome: Ongoing, Progressing   Goal Outcome Evaluation:     Sam appeared asleep at the start of the shift. Respirations appeared regular. Pt was awaken at 0330 requested and received  PRN Ativan 1 Mg for sleep. He appeared asleep for 6 hours.

## 2022-06-27 NOTE — PLAN OF CARE
Assessment/Intervention/Current Symptoms and Care Coordination      Attended team meeting and reviewed chart notes.    Pt approached writer asking if I am working on the discharge. Writer stated that consultation with the team is needed.    Dr. Swan has approved a Friday family meeting at 1:00pm.      Discharge Plan or Goal  Discharge to home with parents    Barriers to Discharge   Needs stabilization      Referral Status  None made      Legal Status  Pt is committed with Jo order- Pt will need a PD upon discharge

## 2022-06-28 PROCEDURE — 250N000013 HC RX MED GY IP 250 OP 250 PS 637: Performed by: STUDENT IN AN ORGANIZED HEALTH CARE EDUCATION/TRAINING PROGRAM

## 2022-06-28 PROCEDURE — 99232 SBSQ HOSP IP/OBS MODERATE 35: CPT | Mod: GC | Performed by: PSYCHIATRY & NEUROLOGY

## 2022-06-28 PROCEDURE — 250N000013 HC RX MED GY IP 250 OP 250 PS 637: Performed by: PSYCHIATRY & NEUROLOGY

## 2022-06-28 PROCEDURE — 124N000002 HC R&B MH UMMC

## 2022-06-28 PROCEDURE — H2032 ACTIVITY THERAPY, PER 15 MIN: HCPCS

## 2022-06-28 RX ADMIN — LORAZEPAM 0.5 MG: 0.5 TABLET ORAL at 08:38

## 2022-06-28 RX ADMIN — OMEPRAZOLE 40 MG: 20 CAPSULE, DELAYED RELEASE ORAL at 08:38

## 2022-06-28 RX ADMIN — LORAZEPAM 0.5 MG: 0.5 TABLET ORAL at 13:14

## 2022-06-28 RX ADMIN — LORAZEPAM 0.5 MG: 0.5 TABLET ORAL at 01:06

## 2022-06-28 RX ADMIN — FLUPHENAZINE HYDROCHLORIDE 15 MG: 5 SOLUTION, CONCENTRATE ORAL at 19:42

## 2022-06-28 RX ADMIN — MELATONIN TAB 3 MG 3 MG: 3 TAB at 19:42

## 2022-06-28 ASSESSMENT — ACTIVITIES OF DAILY LIVING (ADL)
ORAL_HYGIENE: INDEPENDENT
DRESS: SCRUBS (BEHAVIORAL HEALTH)
DRESS: INDEPENDENT
LAUNDRY: WITH SUPERVISION
HYGIENE/GROOMING: INDEPENDENT
ORAL_HYGIENE: INDEPENDENT
HYGIENE/GROOMING: INDEPENDENT

## 2022-06-28 NOTE — PLAN OF CARE
"Problem: Behavioral Health Plan of Care  Goal: Develops/Participates in Therapeutic [place] to Support Successful Transition  Outcome: Ongoing, Progressing  Intervention: Foster Therapeutic [place]  Recent Flowsheet Documentation  Taken 6/28/2022 1239 by Britta Ly RN  Trust Relationship/Rapport:    emotional support provided    thoughts/feelings acknowledged    Patient was asleep at the beginning of the shift and woke up for breakfast and medications. He approached the med window requesting Ativan and his dose of Omeprazole. During that interaction, the patient had an irritable mood, tense affect, and with pressured speech. When the writer asked patient if he was feeling anxious, patient immediately stated,  it is for agitation  and wrote repeat to the patient if he is feeling agitated. Patient looked at the writer and said,  yes I am agitated, I think it is the Ativan weaning off of my body . Later in the day during nursing assessment, patient appeared calm, pleasant and was cooperative with nursing questions. Patient denies anxiety, depression, thoughts of harming self/others and auditory hallucinations. No evidence of severe agitation or delusions noted.  Patient verbalized happiness as he is leaving this Friday 7/1/22.      Patient appears disheveled though he stated he showered yesterday. Patient ate 100% of his meals. No BM this shift    Patient is medication compliant, doesn't need reminders. Medication side effects were not observed or reported this shift. Patient reported sore knees but declined nursing interventions and he stated he can manage. No acute medical concern. Patient declined interventions that were offered.. VSS /65   Pulse 90   Temp 97.8  F (36.6  C)   Resp 16   Ht 1.83 m (6' 0.05\")   Wt 69.9 kg (154 lb)   SpO2 98%   BMI 20.86 kg/m       "

## 2022-06-28 NOTE — PLAN OF CARE
Pt appeared to sleep 6.75 hours. Pt up to request PRN ativan for sleep -given @0106 with observed effectiveness. No medical or behavioral events this shift. Pt looking forward to discharge soon.    Problem: Sleep Disturbance  Goal: Adequate Sleep/Rest  Outcome: Ongoing, Progressing
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
## 2022-06-28 NOTE — PLAN OF CARE
06/28/22 1306   Individualization/Patient Specific Goals   Patient Personal Strengths family/social support   Patient Vulnerabilities history of unsuccessful treatment;lacks insight into illness   Anxieties, Fears or Concerns pt doesn't feel he needs any treatment   Individualized Care Needs 15 minute checks   Patient-Specific Goals (Include Timeframe) take meds daily, attend groups daily   Interprofessional Rounds   Participants psychiatrist;ANGEL;nursing   Team Discussion   Participants Dr. Swan, resident Trung Horowitz, ANGEL Vieira, DORA Callejas, two medical students Tawana and Mandeep   Progress moderate progress, pt may be near baseline   Anticipated length of stay 4 days   Continued Stay Criteria/Rationale needs further stabilization   Medical/Physical none mentioned in team report   Precautions 15 minute checks   Plan discharge to parents home after family meeting friday July 1st   Rationale for change in precautions or plan no change   Anticipated Discharge Disposition home with family   PRECAUTIONS AND SAFETY    Behavioral Orders   Procedures    Assault precautions    Cheeking Precautions (behavioral units)     Patient Observed swallowing PO medications; Patient asked to drink water after swallowing medication; Patient in Staff line of sight for 15 minutes after medication given; Mouth checks after PO administration (patient asked to open mouth and stick out their tongue).    Code 1 - Restrict to Unit    Routine Programming     As clinically indicated    Sexual precautions    Status 15     Every 15 minutes.       Safety  Safety WDL: WDL  Patient Location: hallway  Observed Behavior: walking  Observed Behavior (Comment): none  Safety Measures: safety rounds completed  Diversional Activity: music  Suicidality: Status 15, Minimal furniture in room  Seizure precautions: clutter free environment  Assault: status 15, private room  Elopement Interventions: status 15, staff positioned near patient during  heightened activity on unit, engagement in unit activities, distraction, identify factors which lead to feelings of wanting leave  Sexual: status 15, private room

## 2022-06-28 NOTE — CONSULTS
IP consult acknowledged. I left a vm for CTC.     I reviewed the chart and invited CTC to call me back to discuss appropriateness of this referral as I question if a [facility] virtual group is appropriate. Unfortunately, we do not have any in-person programming to offer at this time.     Other programming red flags were identified and need to be discussed.   Also asked that OT give an opinion on patient's presentation during in-person groups and if they think patient will succeed/is appropriate in a virtual group based on their work with him thus far.    I believe [facility] and [facility] have in person groups - CTC may need to look into this options if in person programming is more appropriate.     I will wait for CTC to call me back to further discuss and proceed with DA if needed.      Update-   I received VM from [facility] that patient is under a commitment and has to follow treatment plans.  I left another message that for [facility]patient [facility], the patient still needs to have some level of insight and want to participate otherwise, they find that is is disruptive to the group setting.  They also do not enforce commitments so patient must have some of interest.  I have sent patient's info to the  and supervisor to review.  If they decide they want to try the patient in a group then we may proceed with completing the DA. I will follow-up with CTC once I hear back from Outpatient Programming staff.    2:46 PM - Update  Crockett back from programming staff.  Patient's insurance does not cover the group. Left VM for CTC.       Shannon Israel, [facility]  [facility]  Phone: [phone]

## 2022-06-28 NOTE — PLAN OF CARE
"  Problem: Manic Behavior Episode  Goal: Decreased Manic Symptoms  Outcome: Ongoing, Progressing  Intervention: Promote Mood Equilibrium  Recent Flowsheet Documentation  Taken 6/27/2022 1646 by Winifred Curry RN  Behavior Management: impulse control promoted  Sensory Stimulation Regulation: auditory stimulation minimized  Supportive Measures:    active listening utilized    positive reinforcement provided     Problem: Thought Process Alteration  Goal: Optimal Thought Clarity  Outcome: Ongoing, Progressing  Intervention: Minimize Safety Risk and Altered Thought  Recent Flowsheet Documentation  Taken 6/27/2022 1646 by Winifred Curry RN  Sensory Stimulation Regulation: auditory stimulation minimized   Goal Outcome Evaluation:    Plan of Care Reviewed With: patient    Pt out on the unit, pacing and at times dancing in the hallway. Pt appears very excited this shift, he stated \"Dr Santoro said I will be discharging on Friday.\" Pt has been pleasant and cooperative this shift with less psychotic symptoms this evening, in comparison to previous days. No pressure speech, grandiose statements, no signs of internal stimuli etc. Pt's appetite is adequate. Hygiene is good. Pt did not endorsed any psychotic symptoms. No new concerns this evening. Pt requested and administered prn tylenol for c/o leg pain rated at 4/10, ativan and melatonin at 1900 for sleep. Pt rated pain at 2 an hour later. Pt did not endorsed SI/HI and depression.     "

## 2022-06-29 PROCEDURE — 99232 SBSQ HOSP IP/OBS MODERATE 35: CPT | Mod: GC | Performed by: PSYCHIATRY & NEUROLOGY

## 2022-06-29 PROCEDURE — H2032 ACTIVITY THERAPY, PER 15 MIN: HCPCS

## 2022-06-29 PROCEDURE — 250N000013 HC RX MED GY IP 250 OP 250 PS 637: Performed by: STUDENT IN AN ORGANIZED HEALTH CARE EDUCATION/TRAINING PROGRAM

## 2022-06-29 PROCEDURE — 124N000002 HC R&B MH UMMC

## 2022-06-29 PROCEDURE — 250N000013 HC RX MED GY IP 250 OP 250 PS 637: Performed by: PSYCHIATRY & NEUROLOGY

## 2022-06-29 RX ADMIN — FLUPHENAZINE HYDROCHLORIDE 15 MG: 5 SOLUTION, CONCENTRATE ORAL at 21:35

## 2022-06-29 RX ADMIN — BENZTROPINE MESYLATE 1 MG: 1 TABLET ORAL at 10:49

## 2022-06-29 RX ADMIN — ACETAMINOPHEN 650 MG: 325 TABLET ORAL at 14:22

## 2022-06-29 RX ADMIN — LORAZEPAM 0.5 MG: 0.5 TABLET ORAL at 08:18

## 2022-06-29 RX ADMIN — OMEPRAZOLE 40 MG: 20 CAPSULE, DELAYED RELEASE ORAL at 08:18

## 2022-06-29 RX ADMIN — ACETAMINOPHEN 650 MG: 325 TABLET ORAL at 02:02

## 2022-06-29 RX ADMIN — MELATONIN TAB 3 MG 3 MG: 3 TAB at 19:40

## 2022-06-29 RX ADMIN — ACETAMINOPHEN 650 MG: 325 TABLET ORAL at 19:40

## 2022-06-29 RX ADMIN — BENZTROPINE MESYLATE 1 MG: 1 TABLET ORAL at 02:33

## 2022-06-29 RX ADMIN — LORAZEPAM 0.5 MG: 0.5 TABLET ORAL at 02:33

## 2022-06-29 ASSESSMENT — ACTIVITIES OF DAILY LIVING (ADL)
DRESS: INDEPENDENT;SCRUBS (BEHAVIORAL HEALTH)
HYGIENE/GROOMING: INDEPENDENT
HYGIENE/GROOMING: INDEPENDENT
LAUNDRY: WITH SUPERVISION
LAUNDRY: WITH SUPERVISION
ORAL_HYGIENE: INDEPENDENT
ORAL_HYGIENE: INDEPENDENT
DRESS: INDEPENDENT

## 2022-06-29 NOTE — PROGRESS NOTES
SPIRITUAL HEALTH SERVICES  SPIRITUAL ASSESSMENT Progress Note  [facility] ([facility]     REFERRAL SOURCE: I did try to visit twice patient Sam per epic consult order. However, on my both visit attempts, pt was in a deep sleep. I tried to wake him up but he was very sleepy and then I let him to be alone and sleep. I also informed my visit attempts to the pt nurse.    PLAN: I will visit him the pt again on Friday.    Neelima Celaya M.Div. ([place]), M.Th., D.Min., [facility]  Staff   Pager [phone]

## 2022-06-29 NOTE — PLAN OF CARE
"   06/29/22 1509   Group Therapy Session   Group Attendance attended group session   Time Session Began 1415   Time Session Ended 1515   Total Time patient participated (minutes) 15 (No Charge)   Total # Attendees 1   Group Type Occupational Therapy   Group Topic Covered cognitive therapy techniques;coping skills/lifestyle management;leisure exploration/use of leisure time;emotions/expression;relaxation techniques;self-care activities;structured socialization   Group Session Detail General health and coping and leisure   Patient Response Detail Intervention: General Health and Coping Group with 0 peers.    Patient Response: Pt joined for the last 15 minutes of group. Pt participated in group card game focused on discussion of coping skills, self-care techniques and communication through leisure task. Pt was an active participant while they were in group, following along with card game and answering each of the coping skills questions. Pt was religiously focused in their conversation for much of group. Pt also spoke with writer about their excitement to discharge and noted their plan was \"to go home and smoke some week so I can sleep if I am being honest\". Pt went on to talk about how they disagreed with the diagnosis of bipolar, stating everyone is bipolar if they have ever cried or cheered and felt good in their life and wanting to \"get out of this hell hole\". After card game pt thanked writer for group and returned to walking in the hallway.     Mood/Affect: Pleasant       Plan: Patient encouraged to maintain attendance for continued ongoing support in working towards occupational therapy goals to support overall treatment/care.          "
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
## 2022-06-29 NOTE — PROGRESS NOTES
06/28/22 2100   Group Therapy Session   Time Session Began 1715   Time Session Ended 1810   Total Time patient participated (minutes) 53   Total # Attendees 4   Group Type expressive therapy   Group Topic Covered relaxation techniques   Group Session Detail Evening Relaxation   Patient Response/Contribution cooperative with task   Patient Response Detail Cooperatively engaged in Evening Music Relaxation group to decrease anxiety and promote sleep.  Calm affect, appropriately engaged in session, responding well to the music.  Tearful at times, appearing to have a safe and successful emotional release.

## 2022-06-29 NOTE — PROGRESS NOTES
Pt participated in a body-based meditation with a high level of focus.  He made some physical adjustments but was generally restful.  He made a request for something to help with his exhaustion due to insomnia.  He was more reality-based than previous  sessions with this therapist but still unsure if his reported discharge date of Friday is accurate.  He stated he is counting down the number of meals (6) until he discharges and then his plan is to use marijuana to help him sleep better.         06/29/22 1700   Group Therapy Session   Group Attendance attended group session   Total Time patient participated (minutes) 50   Group Type expressive therapy;psychotherapeutic   Group Topic Covered relaxation techniques;self-care activities

## 2022-06-29 NOTE — PLAN OF CARE
"  Problem: Pain Acute  Goal: Acceptable Pain Control and Functional Ability  Outcome: Ongoing, Progressing     Problem: Cognitive Impairment (Psychotic Signs/Symptoms)  Goal: Optimal Cognitive Function (Psychotic Signs/Symptoms)  Outcome: Ongoing, Progressing  Flowsheets (Taken 6/28/2022 1900)  Mutually Determined Action Steps (Optimal Cognitive Function): participates in problem resolution   Goal Outcome Evaluation:    Pt met in room at the beginning of the shift. Pt states he was going through \"ativan withdrawal\" he reports fidgeting, tremor and feeling tired which he stated from side effect of ativan. No observed signs and symptoms of medication withdrawal this evening. Pt was given information on S/E of ativan. He said he might stop taking ativan as of now. Pt was pleasant  on approach, good interaction with staff. His  speech was appropriate and calm mood. Pt was visible on the unit, pacing the halls, with headphone on, listening to music. Pt was some how withdrawn, mostly kept to himself. Pt reports \"regular bowel movement\" from prune juice he received this morning. Pt was encouraged to maintain adequate fluid intake as well. Pt eating,100% and snacked well. Pt denies all psychotic symptoms including SI/HI, SIB, A/V hallucination and depression. Pt remains hopeful he will be discharging home on Friday. He talked about sleeping on his own bed and having \"good food\". Pt received prn melatonin 3 mg with hs medication as requested for sleep. No out burst behavior.                          "

## 2022-06-29 NOTE — PLAN OF CARE
Assessment/Intervention/Current Symptoms and Care Coordination    Attended team meeting and reviewed chart notes.    Writer made a referral for a Day Treatment assessment with [facility]-date and time are in the chart.    A family meeting is set for Friday 7/1 @ 11:30 and pt to discharge after the meeting.      Discharge Plan or Goal  Return home with family    Barriers to Discharge   Needs further stabilization  Needs family meeting      Referral Status    In place      Legal Status  Pt is committed with Jo order- pt will need a PD upon discharge.
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
## 2022-06-29 NOTE — PLAN OF CARE
Pt appeared asleep at start of shift, later waking to c/o neck pain -given PRN tylenol. Pt reports poor sleep of about 30 minutes due to waking from nightmares. Pt unable to sleep and requested ativan, given @0233. Pt expressed frustration that he cannot use marijuana in the hospital, pt reports feeling restlessness and fidgeting -given PRN cogentin. Pt reports neck pain has resolved. Appeared to sleep 6.25 hours.    Problem: Sleep Disturbance  Goal: Adequate Sleep/Rest  Outcome: Ongoing, Not Progressing

## 2022-06-29 NOTE — PLAN OF CARE
06/29/22 1504   Group Therapy Session   Group Attendance attended group session   Time Session Began 1315   Time Session Ended 1415   Total Time patient participated (minutes) 25 (No Charge)   Total # Attendees 1   Group Type Occupational Therapy   Group Topic Covered coping skills/lifestyle management;emotions/expression;relaxation techniques;self-care activities   Group Session Detail General health and coping   Patient Response Detail Intervention: General Health and Coping Group with 0 peers.    Patient Response: Pt joined group and requested to do guided meditation as they were the only pt in group. Pt used yoga mat to lie on the floor and go through two guided meditation exercises, one on relaxation and other the release of anger. Pt was focused on this for the duration of their time in group and noted this to be helpful. Pt spoke about how guided meditation is something they did at home prior to coming in the hospital to help them sleep. Pt thanked writer for the opportunity to do these meditations and left group.     Mood/Affect: Pleasant      Plan: Patient encouraged to maintain attendance for continued ongoing support in working towards occupational therapy goals to support overall treatment/care.

## 2022-06-30 PROCEDURE — 250N000013 HC RX MED GY IP 250 OP 250 PS 637: Performed by: STUDENT IN AN ORGANIZED HEALTH CARE EDUCATION/TRAINING PROGRAM

## 2022-06-30 PROCEDURE — 124N000002 HC R&B MH UMMC

## 2022-06-30 PROCEDURE — 250N000013 HC RX MED GY IP 250 OP 250 PS 637: Performed by: PSYCHIATRY & NEUROLOGY

## 2022-06-30 PROCEDURE — G0177 OPPS/PHP; TRAIN & EDUC SERV: HCPCS

## 2022-06-30 PROCEDURE — 99232 SBSQ HOSP IP/OBS MODERATE 35: CPT | Mod: GC | Performed by: PSYCHIATRY & NEUROLOGY

## 2022-06-30 PROCEDURE — 250N000013 HC RX MED GY IP 250 OP 250 PS 637

## 2022-06-30 RX ORDER — FLUPHENAZINE DECANOATE 25 MG/ML
50 INJECTION, SOLUTION INTRAMUSCULAR; SUBCUTANEOUS ONCE
Status: COMPLETED | OUTPATIENT
Start: 2022-07-01 | End: 2022-07-01

## 2022-06-30 RX ORDER — FLUPHENAZINE HYDROCHLORIDE 5 MG/1
5 TABLET ORAL 3 TIMES DAILY
Qty: 90 TABLET | Refills: 0 | Status: SHIPPED | OUTPATIENT
Start: 2022-06-30

## 2022-06-30 RX ORDER — BENZTROPINE MESYLATE 1 MG/1
1 TABLET ORAL 2 TIMES DAILY PRN
Qty: 60 TABLET | Refills: 0 | Status: SHIPPED | OUTPATIENT
Start: 2022-06-30

## 2022-06-30 RX ORDER — ACETAMINOPHEN 325 MG/1
650 TABLET ORAL EVERY 4 HOURS PRN
Qty: 30 TABLET | Refills: 0 | Status: SHIPPED | OUTPATIENT
Start: 2022-06-30

## 2022-06-30 RX ORDER — QUETIAPINE FUMARATE 100 MG/1
100 TABLET, FILM COATED ORAL AT BEDTIME
Status: COMPLETED | OUTPATIENT
Start: 2022-06-30 | End: 2022-06-30

## 2022-06-30 RX ADMIN — FLUPHENAZINE HYDROCHLORIDE 15 MG: 5 SOLUTION, CONCENTRATE ORAL at 19:02

## 2022-06-30 RX ADMIN — ACETAMINOPHEN 650 MG: 325 TABLET ORAL at 12:37

## 2022-06-30 RX ADMIN — ACETAMINOPHEN 650 MG: 325 TABLET ORAL at 04:38

## 2022-06-30 RX ADMIN — ACETAMINOPHEN 650 MG: 325 TABLET ORAL at 16:38

## 2022-06-30 RX ADMIN — HYDROXYZINE HYDROCHLORIDE 25 MG: 25 TABLET, FILM COATED ORAL at 03:33

## 2022-06-30 RX ADMIN — BENZTROPINE MESYLATE 1 MG: 1 TABLET ORAL at 11:01

## 2022-06-30 RX ADMIN — ACETAMINOPHEN 650 MG: 325 TABLET ORAL at 00:06

## 2022-06-30 RX ADMIN — QUETIAPINE FUMARATE 100 MG: 100 TABLET ORAL at 20:18

## 2022-06-30 RX ADMIN — HYDROXYZINE HYDROCHLORIDE 25 MG: 25 TABLET, FILM COATED ORAL at 08:35

## 2022-06-30 RX ADMIN — ACETAMINOPHEN 650 MG: 325 TABLET ORAL at 22:43

## 2022-06-30 RX ADMIN — OMEPRAZOLE 40 MG: 20 CAPSULE, DELAYED RELEASE ORAL at 07:50

## 2022-06-30 RX ADMIN — BENZTROPINE MESYLATE 1 MG: 1 TABLET ORAL at 01:54

## 2022-06-30 RX ADMIN — ACETAMINOPHEN 650 MG: 325 TABLET ORAL at 08:35

## 2022-06-30 ASSESSMENT — ACTIVITIES OF DAILY LIVING (ADL)
HYGIENE/GROOMING: INDEPENDENT
ADLS_ACUITY_SCORE: 30
ADLS_ACUITY_SCORE: 30
DRESS: INDEPENDENT
ADLS_ACUITY_SCORE: 30
HYGIENE/GROOMING: INDEPENDENT
ORAL_HYGIENE: INDEPENDENT
ADLS_ACUITY_SCORE: 30
ADLS_ACUITY_SCORE: 30
LAUNDRY: WITH SUPERVISION
ORAL_HYGIENE: INDEPENDENT
ADLS_ACUITY_SCORE: 30
DRESS: INDEPENDENT
ADLS_ACUITY_SCORE: 30

## 2022-06-30 NOTE — PLAN OF CARE
"  Problem: Sleep Disturbance  Goal: Adequate Sleep/Rest  Outcome: Ongoing, Progressing   Goal Outcome Evaluation:    Pt awake, pacing the hallway at the start of the shift. At 0006 and 0449 pt requested and received PRN Tylenol 650 Mg for bilateral  leg pain rated 4/10 effective upon follow-up. At 0154 pt requested and received Cogentin 1 Mg for hand tremor per pt, \"Am having withdrawal symptom from Ativan\". At 0330 pt also received 25 Mg Atarax for anxiety. Pt received some snacks. He paced the hallway most of the night, unable to fall asleep. He appeared to sleep 1 hour this night.    "

## 2022-06-30 NOTE — PLAN OF CARE
Problem: Manic Behavior Episode  Goal: Decreased Manic Symptoms  6/29/2022 1907 by Petra Toribio RN  Outcome: Ongoing, Progressing    Pt visible in the milieu off and on throughout the evening spending much of his time walking the hallway. He is calm and pleasant with others. Pt is often hyper-verbal and pressured in his speech pattern. He appears ruminative regarding a meditative thought process. Denies MH symptoms, including SI/SIB. Requested and received Tylenol prn for pain rated at a 4 out of 10, as well as Melatonin prn. Took all scheduled medications without issue. No additional concerns at this time. Will continue to assess and offer support.
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
## 2022-06-30 NOTE — PLAN OF CARE
Problem: Manic Behavior Episode  Goal: Decreased Manic Symptoms  Outcome: Ongoing, Progressing     Pt is pleasant on approach. Excited to discharge tomorrow. Paced the halls and attended group during the shift. Napped for 1 hour. Med compliant. Received tylenol x2 and hydroxyzine for a headache and anxiety. Will continue to monitor and assist as needed.

## 2022-06-30 NOTE — PLAN OF CARE
Assessment/Intervention/Current Symptoms and Care Coordination    Attended team meeting and reviewed chart notes.    Writer made an appointment for a Prolixin injection at [facility] office. Appointment is on the AVS.    Family meeting set for Friday at 11:30.      Discharge Plan or Goal  Return to home with parents upon discharge      Barriers to Discharge   Needs stabilization and IM injection tomorrow      Referral Status  To [facility] day treatment    Legal Status  Pt is committed with Jo- needs pd upon discharge

## 2022-06-30 NOTE — PROGRESS NOTES
06/29/22 1900   Group Therapy Session   Group Attendance attended group session   Time Session Began 1715   Time Session Ended 1745   Total Time patient participated (minutes) 30   Total # Attendees 2   Group Type recreation   Group Topic Covered leisure exploration/use of leisure time   Group Session Detail TR leisure group   Patient Response/Contribution cooperative with task   Patient Response Detail Pt attended the structured Therapeutic Recreation group, participating in a group activity. Pt participated in group discussion, leisure participation, and social engagement to gain self-esteem, manage behaviors, improve social skills, decrease isolation, and reduce anxiety/depression.   Pt remained focused and engaged throughout group activity.  Pt was sociable and was appropriate with interactions with the others in group. Pt was an active participant, contributing to the clues and descriptions throughout the activity. Pt was more organized in the process for learning a new game compared to prior TR groups.

## 2022-06-30 NOTE — PLAN OF CARE
[facility] Occupational Therapy Group Intervention Note     06/30/22 1106   Group Therapy Session   Group Attendance attended group session   Time Session Began 1010   Time Session Ended 1100   Total Time patient participated (minutes) 45   Total # Attendees 2   Group Type psychoeducation   Group Topic Covered relaxation techniques;coping skills/lifestyle management   Group Session Detail Coping skill exploration group through guided meditation for decreased anxiety and stress management. Education was provided on the use of meditation within daily/weekly routine. Pt reported previous use of Ta Jackson for relaxation.    Patient Response/Contribution cooperative with task;discussed personal experience with topic;listened actively   Patient Response Detail Was observed fully engaged in two 20 minute guided meditation practices. Expressed significant positive outcome after group and was observed with calm even demeanor.      Kassy Harding OT on 6/30/2022 at 11:07 AM

## 2022-07-01 VITALS
HEART RATE: 78 BPM | OXYGEN SATURATION: 97 % | RESPIRATION RATE: 16 BRPM | WEIGHT: 154 LBS | BODY MASS INDEX: 20.86 KG/M2 | DIASTOLIC BLOOD PRESSURE: 81 MMHG | TEMPERATURE: 98.3 F | HEIGHT: 72 IN | SYSTOLIC BLOOD PRESSURE: 114 MMHG

## 2022-07-01 PROCEDURE — 250N000013 HC RX MED GY IP 250 OP 250 PS 637: Performed by: PSYCHIATRY & NEUROLOGY

## 2022-07-01 PROCEDURE — 250N000011 HC RX IP 250 OP 636

## 2022-07-01 PROCEDURE — G0177 OPPS/PHP; TRAIN & EDUC SERV: HCPCS

## 2022-07-01 PROCEDURE — 250N000013 HC RX MED GY IP 250 OP 250 PS 637: Performed by: STUDENT IN AN ORGANIZED HEALTH CARE EDUCATION/TRAINING PROGRAM

## 2022-07-01 RX ORDER — QUETIAPINE FUMARATE 50 MG/1
50 TABLET, FILM COATED ORAL AT BEDTIME
Qty: 30 TABLET | Refills: 0 | Status: SHIPPED | OUTPATIENT
Start: 2022-07-01

## 2022-07-01 RX ADMIN — BENZTROPINE MESYLATE 1 MG: 1 TABLET ORAL at 00:58

## 2022-07-01 RX ADMIN — OMEPRAZOLE 40 MG: 20 CAPSULE, DELAYED RELEASE ORAL at 07:46

## 2022-07-01 RX ADMIN — FLUPHENAZINE DECANOATE 50 MG: 25 INJECTION, SOLUTION INTRAMUSCULAR; SUBCUTANEOUS at 07:58

## 2022-07-01 RX ADMIN — ACETAMINOPHEN 650 MG: 325 TABLET ORAL at 05:57

## 2022-07-01 RX ADMIN — MELATONIN TAB 3 MG 3 MG: 3 TAB at 00:59

## 2022-07-01 ASSESSMENT — ACTIVITIES OF DAILY LIVING (ADL)
ADLS_ACUITY_SCORE: 30
ORAL_HYGIENE: INDEPENDENT
ADLS_ACUITY_SCORE: 30
HYGIENE/GROOMING: INDEPENDENT
ADLS_ACUITY_SCORE: 30
ADLS_ACUITY_SCORE: 30
DRESS: INDEPENDENT
ADLS_ACUITY_SCORE: 30
LAUNDRY: WITH SUPERVISION

## 2022-07-01 NOTE — PLAN OF CARE
Problem: Sleep Disturbance  Goal: Adequate Sleep/Rest  Outcome: Ongoing, Progressing  Patient was sleeping when shift started. Respirations easy and non labored. Woke up around 0100, ate snacks and requested for melatonin for sleep and cogentin for restless legs.  Patient then colored for a while, listened to headphones and went to bed.  Patient requested for tylenol for his feet pain rated 4/10 early this morning.  Slept for about 6.25 hours.
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
## 2022-07-01 NOTE — PLAN OF CARE
"  Problem: Behavioral Health Plan of Care  Goal: Adheres to Safety Considerations for Self and Others  Intervention: Develop and Maintain Individualized Safety Plan  Recent Flowsheet Documentation  Taken 6/30/2022 2144 by Winifred Curry RN  Safety Measures: safety rounds completed     Problem: Pain Acute  Goal: Acceptable Pain Control and Functional Ability  Intervention: Prevent or Manage Pain  Recent Flowsheet Documentation  Taken 6/30/2022 2144 by Winifred Curry RN  Bowel Elimination Promotion: adequate fluid intake promoted  Medication Review/Management: medications reviewed   Goal Outcome Evaluation:    Plan of Care Reviewed With: patient      Pt met in the hallway at the beging of the shift. Pt c/o bilateral knee pain rated at 5/10. Prn tylenol 650 mg po given at 1540 and was helpful, pt states intervention was effective and rated pain at 2/10. Pt encouraged to apply cold/warm pack but declined. Pt was visible on the unit with headphone, pacing in the hallway. Pt presents with flat/blunted affect and calm mood. Affects brightened as the shift progress. Pt excited he will be discharging home tomorrow, he said \"I'm going to be using marijuana to give me good night sleep.\" Pt said he is \"happy\" to see and hang out with his friends. No further c/o any distress/pain noted. Pt denies all psychotic symptoms that includes SI/HI, SIB, A/V hallucination and depression. Pt eating and drinking well. Pt took his medication without any issues.                  "

## 2022-07-01 NOTE — PROGRESS NOTES
SPIRITUAL HEALTH SERVICES  SPIRITUAL ASSESSMENT Progress Note  [facility] ([facility]     REFERRAL SOURCE: I did visit this morning patient Sam per  follow up visit. I encouraged pt to come into my spirituality group and he did came to attend. Pt was active participant and enjoyed the group activities. Pt mentioned that he is going to be discharge today and he was so excited about his discharging plan. Pt like a lot to do something that calming him down and gather his thoughts though a meditation with this  and the  allow him to do and he did like it. At the end of our conversation, based on his prayer request, I offered him a sending him out blessings and prayers.    PLAN: No further follow up needs.    Neelima Celaya M.Div ([place])., M.Th., D.Min., [facility]  Staff   Pager [phone]

## 2022-07-01 NOTE — PROGRESS NOTES
Pt discharged at 1200. Pt's belongings were returned. Medications sent with from [facility]. Writer reviewed AVS with pt and pt verbalized understanding. Pt denies SI/HI, agrees to contract for safety out in the community. Pt discharged after care conference with him and his father. Writer escorted pt and father off the unit.

## 2022-07-01 NOTE — PROGRESS NOTES
Behavioral Health  Note   Behavioral Health  Spirituality Group Note     [facility]    Name: Sam Minor    YOB: 1985   MRN: [id number]    Age: 37 year old     Patient attended -led group, which included discussion of spirituality, coping with illness and building resilience.   Patient attended group for 1.0 hrs.   The patient actively participated in group discussion     [place] [facility]  Staff    Page [phone]

## 2022-07-01 NOTE — PLAN OF CARE
Goal Outcome Evaluation:    Plan of Care Reviewed With: patient     Problem: Manic Behavior Episode  Goal: Decreased Manic Symptoms  Outcome: Ongoing, Progressing     Pt is pleasant and cooperative on approach. Excited to discharge today. Approached writer this am and informed her of the legalization of edibles in [place], which he is excited about. Eager to get injection. Had care conference with his father and discharged shortly after.
